# Patient Record
Sex: FEMALE | Race: WHITE | Employment: OTHER | URBAN - METROPOLITAN AREA
[De-identification: names, ages, dates, MRNs, and addresses within clinical notes are randomized per-mention and may not be internally consistent; named-entity substitution may affect disease eponyms.]

---

## 2019-02-25 RX ORDER — LISINOPRIL 20 MG/1
20 TABLET ORAL DAILY
Status: ON HOLD | COMMUNITY
End: 2020-03-19 | Stop reason: HOSPADM

## 2019-02-25 RX ORDER — ATENOLOL 50 MG/1
50 TABLET ORAL DAILY
Status: ON HOLD | COMMUNITY
End: 2020-03-19 | Stop reason: HOSPADM

## 2019-02-25 RX ORDER — QUETIAPINE FUMARATE 50 MG
50 TABLET ORAL DAILY
Status: ON HOLD | COMMUNITY
End: 2019-12-03

## 2019-02-25 RX ORDER — FENOFIBRATE 160 MG/1
160 TABLET ORAL DAILY
Status: ON HOLD | COMMUNITY
End: 2020-03-18

## 2019-03-06 ENCOUNTER — ANESTHESIA EVENT (OUTPATIENT)
Dept: SURGERY | Age: 74
End: 2019-03-06
Payer: MEDICARE

## 2019-03-11 ENCOUNTER — ANESTHESIA (OUTPATIENT)
Dept: SURGERY | Age: 74
End: 2019-03-11
Payer: MEDICARE

## 2019-03-11 ENCOUNTER — HOSPITAL ENCOUNTER (OUTPATIENT)
Age: 74
Setting detail: OUTPATIENT SURGERY
Discharge: HOME | End: 2019-03-11
Attending: OPHTHALMOLOGY | Admitting: OPHTHALMOLOGY
Payer: MEDICARE

## 2019-03-11 VITALS
BODY MASS INDEX: 28.61 KG/M2 | WEIGHT: 178 LBS | OXYGEN SATURATION: 96 % | HEART RATE: 54 BPM | DIASTOLIC BLOOD PRESSURE: 66 MMHG | SYSTOLIC BLOOD PRESSURE: 125 MMHG | HEIGHT: 66 IN | RESPIRATION RATE: 14 BRPM | TEMPERATURE: 98.9 F

## 2019-03-11 VITALS — DIASTOLIC BLOOD PRESSURE: 70 MMHG | OXYGEN SATURATION: 100 % | SYSTOLIC BLOOD PRESSURE: 135 MMHG

## 2019-03-11 LAB
GLUCOSE BLD-MCNC: 158 MG/DL (ref 70–99)
GLUCOSE BLD-MCNC: 167 MG/DL (ref 70–99)
PERFORMED ON: ABNORMAL
PERFORMED ON: ABNORMAL

## 2019-03-11 PROCEDURE — 3700000001 HC ADD 15 MINUTES (ANESTHESIA): Performed by: OPHTHALMOLOGY

## 2019-03-11 PROCEDURE — 2580000003 HC RX 258: Performed by: NURSE ANESTHETIST, CERTIFIED REGISTERED

## 2019-03-11 PROCEDURE — 3600000014 HC SURGERY LEVEL 4 ADDTL 15MIN: Performed by: OPHTHALMOLOGY

## 2019-03-11 PROCEDURE — 3700000000 HC ANESTHESIA ATTENDED CARE: Performed by: OPHTHALMOLOGY

## 2019-03-11 PROCEDURE — 6370000000 HC RX 637 (ALT 250 FOR IP): Performed by: OPHTHALMOLOGY

## 2019-03-11 PROCEDURE — 3600000004 HC SURGERY LEVEL 4 BASE: Performed by: OPHTHALMOLOGY

## 2019-03-11 PROCEDURE — 2580000003 HC RX 258: Performed by: ANESTHESIOLOGY

## 2019-03-11 PROCEDURE — 2500000003 HC RX 250 WO HCPCS: Performed by: OPHTHALMOLOGY

## 2019-03-11 PROCEDURE — C1780 LENS, INTRAOCULAR (NEW TECH): HCPCS | Performed by: OPHTHALMOLOGY

## 2019-03-11 PROCEDURE — 2709999900 HC NON-CHARGEABLE SUPPLY: Performed by: OPHTHALMOLOGY

## 2019-03-11 PROCEDURE — 6360000002 HC RX W HCPCS: Performed by: ANESTHESIOLOGY

## 2019-03-11 PROCEDURE — 7100000010 HC PHASE II RECOVERY - FIRST 15 MIN: Performed by: OPHTHALMOLOGY

## 2019-03-11 DEVICE — LENS INTOCU 21.0 DIOPT 118.7 A CONSTANT L13MM DIA6MM 0DEG: Type: IMPLANTABLE DEVICE | Status: FUNCTIONAL

## 2019-03-11 RX ORDER — SODIUM CHLORIDE 9 MG/ML
INJECTION, SOLUTION INTRAVENOUS CONTINUOUS
Status: DISCONTINUED | OUTPATIENT
Start: 2019-03-11 | End: 2019-03-11 | Stop reason: HOSPADM

## 2019-03-11 RX ORDER — TETRACAINE HYDROCHLORIDE 5 MG/ML
SOLUTION OPHTHALMIC
Status: COMPLETED | OUTPATIENT
Start: 2019-03-11 | End: 2019-03-11

## 2019-03-11 RX ORDER — SODIUM CHLORIDE 0.9 % (FLUSH) 0.9 %
10 SYRINGE (ML) INJECTION EVERY 12 HOURS SCHEDULED
Status: DISCONTINUED | OUTPATIENT
Start: 2019-03-11 | End: 2019-03-11 | Stop reason: HOSPADM

## 2019-03-11 RX ORDER — MORPHINE SULFATE 4 MG/ML
1 INJECTION, SOLUTION INTRAMUSCULAR; INTRAVENOUS EVERY 5 MIN PRN
Status: DISCONTINUED | OUTPATIENT
Start: 2019-03-11 | End: 2019-03-11 | Stop reason: HOSPADM

## 2019-03-11 RX ORDER — FENTANYL CITRATE 50 UG/ML
50 INJECTION, SOLUTION INTRAMUSCULAR; INTRAVENOUS EVERY 5 MIN PRN
Status: DISCONTINUED | OUTPATIENT
Start: 2019-03-11 | End: 2019-03-11 | Stop reason: HOSPADM

## 2019-03-11 RX ORDER — SODIUM CHLORIDE 0.9 % (FLUSH) 0.9 %
10 SYRINGE (ML) INJECTION EVERY 12 HOURS SCHEDULED
Status: DISCONTINUED | OUTPATIENT
Start: 2019-03-11 | End: 2019-03-11 | Stop reason: SDUPTHER

## 2019-03-11 RX ORDER — OXYCODONE HYDROCHLORIDE AND ACETAMINOPHEN 5; 325 MG/1; MG/1
1 TABLET ORAL PRN
Status: DISCONTINUED | OUTPATIENT
Start: 2019-03-11 | End: 2019-03-11 | Stop reason: HOSPADM

## 2019-03-11 RX ORDER — FENTANYL CITRATE 50 UG/ML
25 INJECTION, SOLUTION INTRAMUSCULAR; INTRAVENOUS EVERY 5 MIN PRN
Status: COMPLETED | OUTPATIENT
Start: 2019-03-11 | End: 2019-03-11

## 2019-03-11 RX ORDER — ONDANSETRON 2 MG/ML
4 INJECTION INTRAMUSCULAR; INTRAVENOUS
Status: DISCONTINUED | OUTPATIENT
Start: 2019-03-11 | End: 2019-03-11 | Stop reason: HOSPADM

## 2019-03-11 RX ORDER — MEPERIDINE HYDROCHLORIDE 25 MG/ML
12.5 INJECTION INTRAMUSCULAR; INTRAVENOUS; SUBCUTANEOUS EVERY 5 MIN PRN
Status: DISCONTINUED | OUTPATIENT
Start: 2019-03-11 | End: 2019-03-11 | Stop reason: HOSPADM

## 2019-03-11 RX ORDER — SODIUM CHLORIDE 9 MG/ML
INJECTION, SOLUTION INTRAVENOUS CONTINUOUS PRN
Status: DISCONTINUED | OUTPATIENT
Start: 2019-03-11 | End: 2019-03-11 | Stop reason: SDUPTHER

## 2019-03-11 RX ORDER — CIPROFLOXACIN HYDROCHLORIDE 3.5 MG/ML
1 SOLUTION/ DROPS TOPICAL SEE ADMIN INSTRUCTIONS
Status: DISCONTINUED | OUTPATIENT
Start: 2019-03-11 | End: 2019-03-11 | Stop reason: HOSPADM

## 2019-03-11 RX ORDER — CIPROFLOXACIN HYDROCHLORIDE 3.5 MG/ML
1 SOLUTION/ DROPS TOPICAL SEE ADMIN INSTRUCTIONS
OUTPATIENT
Start: 2019-03-11 | End: 2019-03-21

## 2019-03-11 RX ORDER — SODIUM CHLORIDE 0.9 % (FLUSH) 0.9 %
10 SYRINGE (ML) INJECTION PRN
Status: DISCONTINUED | OUTPATIENT
Start: 2019-03-11 | End: 2019-03-11 | Stop reason: HOSPADM

## 2019-03-11 RX ORDER — LIDOCAINE HYDROCHLORIDE 10 MG/ML
INJECTION, SOLUTION EPIDURAL; INFILTRATION; INTRACAUDAL; PERINEURAL
Status: COMPLETED | OUTPATIENT
Start: 2019-03-11 | End: 2019-03-11

## 2019-03-11 RX ORDER — OXYCODONE HYDROCHLORIDE AND ACETAMINOPHEN 5; 325 MG/1; MG/1
2 TABLET ORAL PRN
Status: DISCONTINUED | OUTPATIENT
Start: 2019-03-11 | End: 2019-03-11 | Stop reason: HOSPADM

## 2019-03-11 RX ORDER — TETRACAINE HYDROCHLORIDE 5 MG/ML
1 SOLUTION OPHTHALMIC ONCE
Status: COMPLETED | OUTPATIENT
Start: 2019-03-11 | End: 2019-03-11

## 2019-03-11 RX ORDER — MORPHINE SULFATE 4 MG/ML
2 INJECTION, SOLUTION INTRAMUSCULAR; INTRAVENOUS EVERY 5 MIN PRN
Status: DISCONTINUED | OUTPATIENT
Start: 2019-03-11 | End: 2019-03-11 | Stop reason: HOSPADM

## 2019-03-11 RX ORDER — SODIUM CHLORIDE 0.9 % (FLUSH) 0.9 %
10 SYRINGE (ML) INJECTION PRN
Status: DISCONTINUED | OUTPATIENT
Start: 2019-03-11 | End: 2019-03-11 | Stop reason: SDUPTHER

## 2019-03-11 RX ORDER — BRIMONIDINE TARTRATE 0.15 %
DROPS OPHTHALMIC (EYE)
Status: COMPLETED | OUTPATIENT
Start: 2019-03-11 | End: 2019-03-11

## 2019-03-11 RX ORDER — BALANCED SALT SOLUTION 6.4; .75; .48; .3; 3.9; 1.7 MG/ML; MG/ML; MG/ML; MG/ML; MG/ML; MG/ML
SOLUTION OPHTHALMIC
Status: COMPLETED | OUTPATIENT
Start: 2019-03-11 | End: 2019-03-11

## 2019-03-11 RX ADMIN — FENTANYL CITRATE 25 MCG: 50 INJECTION INTRAMUSCULAR; INTRAVENOUS at 07:51

## 2019-03-11 RX ADMIN — FENTANYL CITRATE 25 MCG: 50 INJECTION INTRAMUSCULAR; INTRAVENOUS at 07:49

## 2019-03-11 RX ADMIN — SODIUM CHLORIDE: 9 INJECTION, SOLUTION INTRAVENOUS at 07:09

## 2019-03-11 RX ADMIN — Medication 0.5 ML: at 07:09

## 2019-03-11 RX ADMIN — FENTANYL CITRATE 25 MCG: 50 INJECTION INTRAMUSCULAR; INTRAVENOUS at 07:36

## 2019-03-11 RX ADMIN — Medication 0.5 ML: at 07:03

## 2019-03-11 RX ADMIN — FENTANYL CITRATE 25 MCG: 50 INJECTION INTRAMUSCULAR; INTRAVENOUS at 07:34

## 2019-03-11 RX ADMIN — POVIDONE-IODINE: 5 SOLUTION OPHTHALMIC at 07:09

## 2019-03-11 RX ADMIN — LIDOCAINE HYDROCHLORIDE: 35 GEL OPHTHALMIC at 07:09

## 2019-03-11 RX ADMIN — TETRACAINE HYDROCHLORIDE 1 DROP: 5 SOLUTION OPHTHALMIC at 07:01

## 2019-03-11 RX ADMIN — SODIUM CHLORIDE: 9 INJECTION, SOLUTION INTRAVENOUS at 07:15

## 2019-03-11 RX ADMIN — CIPROFLOXACIN HYDROCHLORIDE 1 DROP: 3 SOLUTION/ DROPS OPHTHALMIC at 07:02

## 2019-03-11 ASSESSMENT — PULMONARY FUNCTION TESTS
PIF_VALUE: 0

## 2019-03-11 ASSESSMENT — PAIN - FUNCTIONAL ASSESSMENT: PAIN_FUNCTIONAL_ASSESSMENT: 0-10

## 2019-03-11 ASSESSMENT — PAIN SCALES - GENERAL
PAINLEVEL_OUTOF10: 0

## 2019-03-11 ASSESSMENT — LIFESTYLE VARIABLES: SMOKING_STATUS: 0

## 2019-03-22 ENCOUNTER — ANESTHESIA EVENT (OUTPATIENT)
Dept: SURGERY | Age: 74
End: 2019-03-22
Payer: MEDICARE

## 2019-03-25 ENCOUNTER — ANESTHESIA (OUTPATIENT)
Dept: SURGERY | Age: 74
End: 2019-03-25
Payer: MEDICARE

## 2019-03-25 ENCOUNTER — HOSPITAL ENCOUNTER (OUTPATIENT)
Age: 74
Setting detail: OUTPATIENT SURGERY
Discharge: HOME | End: 2019-03-25
Attending: OPHTHALMOLOGY | Admitting: OPHTHALMOLOGY
Payer: MEDICARE

## 2019-03-25 VITALS
OXYGEN SATURATION: 99 % | SYSTOLIC BLOOD PRESSURE: 141 MMHG | DIASTOLIC BLOOD PRESSURE: 85 MMHG | RESPIRATION RATE: 16 BRPM | BODY MASS INDEX: 28.61 KG/M2 | HEART RATE: 51 BPM | TEMPERATURE: 97.1 F | WEIGHT: 178 LBS | HEIGHT: 66 IN

## 2019-03-25 VITALS — SYSTOLIC BLOOD PRESSURE: 150 MMHG | OXYGEN SATURATION: 100 % | DIASTOLIC BLOOD PRESSURE: 69 MMHG

## 2019-03-25 LAB
GLUCOSE BLD-MCNC: 170 MG/DL (ref 70–99)
GLUCOSE BLD-MCNC: 175 MG/DL (ref 70–99)
PERFORMED ON: ABNORMAL
PERFORMED ON: ABNORMAL

## 2019-03-25 PROCEDURE — 6370000000 HC RX 637 (ALT 250 FOR IP): Performed by: OPHTHALMOLOGY

## 2019-03-25 PROCEDURE — 2709999900 HC NON-CHARGEABLE SUPPLY: Performed by: OPHTHALMOLOGY

## 2019-03-25 PROCEDURE — C1780 LENS, INTRAOCULAR (NEW TECH): HCPCS | Performed by: OPHTHALMOLOGY

## 2019-03-25 PROCEDURE — 2500000003 HC RX 250 WO HCPCS: Performed by: OPHTHALMOLOGY

## 2019-03-25 PROCEDURE — 7100000010 HC PHASE II RECOVERY - FIRST 15 MIN: Performed by: OPHTHALMOLOGY

## 2019-03-25 PROCEDURE — 3700000001 HC ADD 15 MINUTES (ANESTHESIA): Performed by: OPHTHALMOLOGY

## 2019-03-25 PROCEDURE — 6360000002 HC RX W HCPCS: Performed by: NURSE ANESTHETIST, CERTIFIED REGISTERED

## 2019-03-25 PROCEDURE — 3600000014 HC SURGERY LEVEL 4 ADDTL 15MIN: Performed by: OPHTHALMOLOGY

## 2019-03-25 PROCEDURE — 3700000000 HC ANESTHESIA ATTENDED CARE: Performed by: OPHTHALMOLOGY

## 2019-03-25 PROCEDURE — 3600000004 HC SURGERY LEVEL 4 BASE: Performed by: OPHTHALMOLOGY

## 2019-03-25 PROCEDURE — 2580000003 HC RX 258: Performed by: ANESTHESIOLOGY

## 2019-03-25 DEVICE — LENS INTOCU 21.5 DIOPT 118.7 A CONSTANT L13MM DIA6MM 0DEG: Type: IMPLANTABLE DEVICE | Site: EYE | Status: FUNCTIONAL

## 2019-03-25 RX ORDER — BALANCED SALT SOLUTION 6.4; .75; .48; .3; 3.9; 1.7 MG/ML; MG/ML; MG/ML; MG/ML; MG/ML; MG/ML
SOLUTION OPHTHALMIC
Status: COMPLETED | OUTPATIENT
Start: 2019-03-25 | End: 2019-03-25

## 2019-03-25 RX ORDER — SODIUM CHLORIDE 0.9 % (FLUSH) 0.9 %
10 SYRINGE (ML) INJECTION PRN
Status: DISCONTINUED | OUTPATIENT
Start: 2019-03-25 | End: 2019-03-25 | Stop reason: SDUPTHER

## 2019-03-25 RX ORDER — FENTANYL CITRATE 50 UG/ML
25 INJECTION, SOLUTION INTRAMUSCULAR; INTRAVENOUS EVERY 5 MIN PRN
Status: DISCONTINUED | OUTPATIENT
Start: 2019-03-25 | End: 2019-03-25 | Stop reason: HOSPADM

## 2019-03-25 RX ORDER — SODIUM CHLORIDE 0.9 % (FLUSH) 0.9 %
10 SYRINGE (ML) INJECTION EVERY 12 HOURS SCHEDULED
Status: DISCONTINUED | OUTPATIENT
Start: 2019-03-25 | End: 2019-03-25 | Stop reason: SDUPTHER

## 2019-03-25 RX ORDER — OXYCODONE HYDROCHLORIDE AND ACETAMINOPHEN 5; 325 MG/1; MG/1
2 TABLET ORAL PRN
Status: DISCONTINUED | OUTPATIENT
Start: 2019-03-25 | End: 2019-03-25 | Stop reason: HOSPADM

## 2019-03-25 RX ORDER — MORPHINE SULFATE 4 MG/ML
2 INJECTION, SOLUTION INTRAMUSCULAR; INTRAVENOUS EVERY 5 MIN PRN
Status: DISCONTINUED | OUTPATIENT
Start: 2019-03-25 | End: 2019-03-25 | Stop reason: HOSPADM

## 2019-03-25 RX ORDER — BRIMONIDINE TARTRATE 0.15 %
DROPS OPHTHALMIC (EYE)
Status: COMPLETED | OUTPATIENT
Start: 2019-03-25 | End: 2019-03-25

## 2019-03-25 RX ORDER — TETRACAINE HYDROCHLORIDE 5 MG/ML
SOLUTION OPHTHALMIC
Status: COMPLETED | OUTPATIENT
Start: 2019-03-25 | End: 2019-03-25

## 2019-03-25 RX ORDER — CIPROFLOXACIN HYDROCHLORIDE 3.5 MG/ML
1 SOLUTION/ DROPS TOPICAL SEE ADMIN INSTRUCTIONS
Status: DISCONTINUED | OUTPATIENT
Start: 2019-03-25 | End: 2019-03-25 | Stop reason: HOSPADM

## 2019-03-25 RX ORDER — SODIUM CHLORIDE 0.9 % (FLUSH) 0.9 %
10 SYRINGE (ML) INJECTION EVERY 12 HOURS SCHEDULED
Status: DISCONTINUED | OUTPATIENT
Start: 2019-03-25 | End: 2019-03-25 | Stop reason: HOSPADM

## 2019-03-25 RX ORDER — MORPHINE SULFATE 4 MG/ML
1 INJECTION, SOLUTION INTRAMUSCULAR; INTRAVENOUS EVERY 5 MIN PRN
Status: DISCONTINUED | OUTPATIENT
Start: 2019-03-25 | End: 2019-03-25 | Stop reason: HOSPADM

## 2019-03-25 RX ORDER — OXYCODONE HYDROCHLORIDE AND ACETAMINOPHEN 5; 325 MG/1; MG/1
1 TABLET ORAL PRN
Status: DISCONTINUED | OUTPATIENT
Start: 2019-03-25 | End: 2019-03-25 | Stop reason: HOSPADM

## 2019-03-25 RX ORDER — MEPERIDINE HYDROCHLORIDE 25 MG/ML
12.5 INJECTION INTRAMUSCULAR; INTRAVENOUS; SUBCUTANEOUS EVERY 5 MIN PRN
Status: DISCONTINUED | OUTPATIENT
Start: 2019-03-25 | End: 2019-03-25 | Stop reason: HOSPADM

## 2019-03-25 RX ORDER — SODIUM CHLORIDE 9 MG/ML
INJECTION, SOLUTION INTRAVENOUS CONTINUOUS
Status: DISCONTINUED | OUTPATIENT
Start: 2019-03-25 | End: 2019-03-25 | Stop reason: HOSPADM

## 2019-03-25 RX ORDER — TETRACAINE HYDROCHLORIDE 5 MG/ML
1 SOLUTION OPHTHALMIC ONCE
Status: COMPLETED | OUTPATIENT
Start: 2019-03-25 | End: 2019-03-25

## 2019-03-25 RX ORDER — ONDANSETRON 2 MG/ML
4 INJECTION INTRAMUSCULAR; INTRAVENOUS
Status: DISCONTINUED | OUTPATIENT
Start: 2019-03-25 | End: 2019-03-25 | Stop reason: HOSPADM

## 2019-03-25 RX ORDER — LIDOCAINE HYDROCHLORIDE 10 MG/ML
INJECTION, SOLUTION EPIDURAL; INFILTRATION; INTRACAUDAL; PERINEURAL
Status: COMPLETED | OUTPATIENT
Start: 2019-03-25 | End: 2019-03-25

## 2019-03-25 RX ORDER — MIDAZOLAM HYDROCHLORIDE 1 MG/ML
INJECTION INTRAMUSCULAR; INTRAVENOUS PRN
Status: DISCONTINUED | OUTPATIENT
Start: 2019-03-25 | End: 2019-03-25 | Stop reason: SDUPTHER

## 2019-03-25 RX ORDER — FENTANYL CITRATE 50 UG/ML
50 INJECTION, SOLUTION INTRAMUSCULAR; INTRAVENOUS EVERY 5 MIN PRN
Status: DISCONTINUED | OUTPATIENT
Start: 2019-03-25 | End: 2019-03-25 | Stop reason: HOSPADM

## 2019-03-25 RX ORDER — SODIUM CHLORIDE 0.9 % (FLUSH) 0.9 %
10 SYRINGE (ML) INJECTION PRN
Status: DISCONTINUED | OUTPATIENT
Start: 2019-03-25 | End: 2019-03-25 | Stop reason: HOSPADM

## 2019-03-25 RX ADMIN — SODIUM CHLORIDE: 0.9 INJECTION, SOLUTION INTRAVENOUS at 06:44

## 2019-03-25 RX ADMIN — LIDOCAINE HYDROCHLORIDE: 35 GEL OPHTHALMIC at 07:31

## 2019-03-25 RX ADMIN — CIPROFLOXACIN HYDROCHLORIDE 1 DROP: 3 SOLUTION/ DROPS OPHTHALMIC at 06:43

## 2019-03-25 RX ADMIN — TETRACAINE HYDROCHLORIDE 1 DROP: 5 SOLUTION OPHTHALMIC at 06:43

## 2019-03-25 RX ADMIN — POVIDONE-IODINE 0.1 ML: 5 SOLUTION OPHTHALMIC at 06:43

## 2019-03-25 RX ADMIN — MIDAZOLAM 2 MG: 1 INJECTION INTRAMUSCULAR; INTRAVENOUS at 06:50

## 2019-03-25 RX ADMIN — Medication 0.5 ML: at 06:43

## 2019-03-25 ASSESSMENT — PAIN SCALES - GENERAL
PAINLEVEL_OUTOF10: 0
PAINLEVEL_OUTOF10: 0

## 2019-03-25 ASSESSMENT — PAIN - FUNCTIONAL ASSESSMENT: PAIN_FUNCTIONAL_ASSESSMENT: 0-10

## 2019-03-25 ASSESSMENT — LIFESTYLE VARIABLES: SMOKING_STATUS: 0

## 2019-12-02 ENCOUNTER — APPOINTMENT (OUTPATIENT)
Dept: CT IMAGING | Age: 74
DRG: 062 | End: 2019-12-02
Payer: MEDICARE

## 2019-12-02 ENCOUNTER — HOSPITAL ENCOUNTER (INPATIENT)
Age: 74
LOS: 2 days | Discharge: NURSING, REHAB OR LONG-TERM CARE | DRG: 062 | End: 2019-12-05
Attending: EMERGENCY MEDICINE | Admitting: PEDIATRICS
Payer: MEDICARE

## 2019-12-02 ENCOUNTER — APPOINTMENT (OUTPATIENT)
Dept: GENERAL RADIOLOGY | Age: 74
DRG: 062 | End: 2019-12-02
Payer: MEDICARE

## 2019-12-02 DIAGNOSIS — R53.1 RIGHT SIDED WEAKNESS: ICD-10-CM

## 2019-12-02 DIAGNOSIS — R47.1 DYSARTHRIA: ICD-10-CM

## 2019-12-02 DIAGNOSIS — I63.9 CEREBROVASCULAR ACCIDENT (CVA), UNSPECIFIED MECHANISM (HCC): Primary | ICD-10-CM

## 2019-12-02 LAB
A/G RATIO: 2.3 (ref 1.1–2.2)
ALBUMIN SERPL-MCNC: 5 G/DL (ref 3.4–5)
ALP BLD-CCNC: 64 U/L (ref 40–129)
ALT SERPL-CCNC: 32 U/L (ref 10–40)
ANION GAP SERPL CALCULATED.3IONS-SCNC: 15 MMOL/L (ref 3–16)
AST SERPL-CCNC: 30 U/L (ref 15–37)
BASOPHILS ABSOLUTE: 0.1 K/UL (ref 0–0.2)
BASOPHILS RELATIVE PERCENT: 1.1 %
BILIRUB SERPL-MCNC: <0.2 MG/DL (ref 0–1)
BUN BLDV-MCNC: 13 MG/DL (ref 7–20)
CALCIUM SERPL-MCNC: 10 MG/DL (ref 8.3–10.6)
CHLORIDE BLD-SCNC: 101 MMOL/L (ref 99–110)
CO2: 24 MMOL/L (ref 21–32)
CREAT SERPL-MCNC: 1 MG/DL (ref 0.6–1.2)
EOSINOPHILS ABSOLUTE: 0.4 K/UL (ref 0–0.6)
EOSINOPHILS RELATIVE PERCENT: 5.1 %
GFR AFRICAN AMERICAN: >60
GFR NON-AFRICAN AMERICAN: 54
GLOBULIN: 2.2 G/DL
GLUCOSE BLD-MCNC: 137 MG/DL (ref 70–99)
HCT VFR BLD CALC: 43 % (ref 36–48)
HEMOGLOBIN: 14.5 G/DL (ref 12–16)
INR BLD: 0.91 (ref 0.86–1.14)
LYMPHOCYTES ABSOLUTE: 2.2 K/UL (ref 1–5.1)
LYMPHOCYTES RELATIVE PERCENT: 31.5 %
MCH RBC QN AUTO: 31.9 PG (ref 26–34)
MCHC RBC AUTO-ENTMCNC: 33.8 G/DL (ref 31–36)
MCV RBC AUTO: 94.4 FL (ref 80–100)
MONOCYTES ABSOLUTE: 0.7 K/UL (ref 0–1.3)
MONOCYTES RELATIVE PERCENT: 10.4 %
NEUTROPHILS ABSOLUTE: 3.5 K/UL (ref 1.7–7.7)
NEUTROPHILS RELATIVE PERCENT: 51.9 %
PDW BLD-RTO: 13.2 % (ref 12.4–15.4)
PLATELET # BLD: 297 K/UL (ref 135–450)
PMV BLD AUTO: 8.2 FL (ref 5–10.5)
POTASSIUM REFLEX MAGNESIUM: 4 MMOL/L (ref 3.5–5.1)
PROTHROMBIN TIME: 10.5 SEC (ref 10–13.2)
RBC # BLD: 4.55 M/UL (ref 4–5.2)
SODIUM BLD-SCNC: 140 MMOL/L (ref 136–145)
TOTAL PROTEIN: 7.2 G/DL (ref 6.4–8.2)
TROPONIN: <0.01 NG/ML
WBC # BLD: 6.8 K/UL (ref 4–11)

## 2019-12-02 PROCEDURE — 70496 CT ANGIOGRAPHY HEAD: CPT

## 2019-12-02 PROCEDURE — 85610 PROTHROMBIN TIME: CPT

## 2019-12-02 PROCEDURE — 3E03317 INTRODUCTION OF OTHER THROMBOLYTIC INTO PERIPHERAL VEIN, PERCUTANEOUS APPROACH: ICD-10-PCS | Performed by: EMERGENCY MEDICINE

## 2019-12-02 PROCEDURE — 6360000004 HC RX CONTRAST MEDICATION: Performed by: EMERGENCY MEDICINE

## 2019-12-02 PROCEDURE — 96376 TX/PRO/DX INJ SAME DRUG ADON: CPT

## 2019-12-02 PROCEDURE — 80053 COMPREHEN METABOLIC PANEL: CPT

## 2019-12-02 PROCEDURE — 96361 HYDRATE IV INFUSION ADD-ON: CPT

## 2019-12-02 PROCEDURE — 96365 THER/PROPH/DIAG IV INF INIT: CPT

## 2019-12-02 PROCEDURE — 71045 X-RAY EXAM CHEST 1 VIEW: CPT

## 2019-12-02 PROCEDURE — 93005 ELECTROCARDIOGRAM TRACING: CPT | Performed by: EMERGENCY MEDICINE

## 2019-12-02 PROCEDURE — 83036 HEMOGLOBIN GLYCOSYLATED A1C: CPT

## 2019-12-02 PROCEDURE — 70450 CT HEAD/BRAIN W/O DYE: CPT

## 2019-12-02 PROCEDURE — 2580000003 HC RX 258: Performed by: EMERGENCY MEDICINE

## 2019-12-02 PROCEDURE — 6360000002 HC RX W HCPCS

## 2019-12-02 PROCEDURE — 96375 TX/PRO/DX INJ NEW DRUG ADDON: CPT

## 2019-12-02 PROCEDURE — 6360000002 HC RX W HCPCS: Performed by: EMERGENCY MEDICINE

## 2019-12-02 PROCEDURE — 99291 CRITICAL CARE FIRST HOUR: CPT

## 2019-12-02 PROCEDURE — 84484 ASSAY OF TROPONIN QUANT: CPT

## 2019-12-02 PROCEDURE — 85025 COMPLETE CBC W/AUTO DIFF WBC: CPT

## 2019-12-02 PROCEDURE — 36415 COLL VENOUS BLD VENIPUNCTURE: CPT

## 2019-12-02 RX ORDER — SODIUM CHLORIDE 0.9 % (FLUSH) 0.9 %
10 SYRINGE (ML) INJECTION EVERY 12 HOURS SCHEDULED
Status: DISCONTINUED | OUTPATIENT
Start: 2019-12-02 | End: 2019-12-03 | Stop reason: SDUPTHER

## 2019-12-02 RX ORDER — DIPHENHYDRAMINE HYDROCHLORIDE 50 MG/ML
25 INJECTION INTRAMUSCULAR; INTRAVENOUS ONCE
Status: COMPLETED | OUTPATIENT
Start: 2019-12-02 | End: 2019-12-02

## 2019-12-02 RX ORDER — 0.9 % SODIUM CHLORIDE 0.9 %
50 INTRAVENOUS SOLUTION INTRAVENOUS ONCE
Status: COMPLETED | OUTPATIENT
Start: 2019-12-02 | End: 2019-12-03

## 2019-12-02 RX ORDER — METOCLOPRAMIDE HYDROCHLORIDE 5 MG/ML
10 INJECTION INTRAMUSCULAR; INTRAVENOUS ONCE
Status: COMPLETED | OUTPATIENT
Start: 2019-12-02 | End: 2019-12-02

## 2019-12-02 RX ORDER — DEXTROSE MONOHYDRATE 25 G/50ML
12.5 INJECTION, SOLUTION INTRAVENOUS
Status: ACTIVE | OUTPATIENT
Start: 2019-12-02 | End: 2019-12-02

## 2019-12-02 RX ORDER — 0.9 % SODIUM CHLORIDE 0.9 %
1000 INTRAVENOUS SOLUTION INTRAVENOUS ONCE
Status: COMPLETED | OUTPATIENT
Start: 2019-12-02 | End: 2019-12-03

## 2019-12-02 RX ORDER — SODIUM CHLORIDE 0.9 % (FLUSH) 0.9 %
10 SYRINGE (ML) INJECTION PRN
Status: DISCONTINUED | OUTPATIENT
Start: 2019-12-02 | End: 2019-12-03 | Stop reason: SDUPTHER

## 2019-12-02 RX ADMIN — DIPHENHYDRAMINE HYDROCHLORIDE 25 MG: 50 INJECTION, SOLUTION INTRAMUSCULAR; INTRAVENOUS at 21:29

## 2019-12-02 RX ADMIN — IOPAMIDOL 75 ML: 755 INJECTION, SOLUTION INTRAVENOUS at 21:00

## 2019-12-02 RX ADMIN — METOCLOPRAMIDE 10 MG: 5 INJECTION, SOLUTION INTRAMUSCULAR; INTRAVENOUS at 21:28

## 2019-12-02 RX ADMIN — SODIUM CHLORIDE 50 ML: 9 INJECTION, SOLUTION INTRAVENOUS at 22:53

## 2019-12-02 RX ADMIN — SODIUM CHLORIDE 1000 ML: 9 INJECTION, SOLUTION INTRAVENOUS at 22:53

## 2019-12-02 RX ADMIN — ALTEPLASE 67.9 MG: KIT at 21:44

## 2019-12-02 RX ADMIN — ALTEPLASE 7.5 MG: KIT at 21:41

## 2019-12-02 ASSESSMENT — ENCOUNTER SYMPTOMS
GASTROINTESTINAL NEGATIVE: 1
COUGH: 0
SHORTNESS OF BREATH: 0
BACK PAIN: 0
EYES NEGATIVE: 1
VOMITING: 0
RESPIRATORY NEGATIVE: 1
ABDOMINAL PAIN: 0
NAUSEA: 0

## 2019-12-03 ENCOUNTER — APPOINTMENT (OUTPATIENT)
Dept: MRI IMAGING | Age: 74
DRG: 062 | End: 2019-12-03
Payer: MEDICARE

## 2019-12-03 ENCOUNTER — APPOINTMENT (OUTPATIENT)
Dept: CT IMAGING | Age: 74
DRG: 062 | End: 2019-12-03
Payer: MEDICARE

## 2019-12-03 PROBLEM — Z92.82 RECEIVED INTRAVENOUS TISSUE PLASMINOGEN ACTIVATOR (TPA) IN EMERGENCY DEPARTMENT: Status: ACTIVE | Noted: 2019-12-03

## 2019-12-03 LAB
ANION GAP SERPL CALCULATED.3IONS-SCNC: 16 MMOL/L (ref 3–16)
BUN BLDV-MCNC: 14 MG/DL (ref 7–20)
CALCIUM SERPL-MCNC: 9.3 MG/DL (ref 8.3–10.6)
CHLORIDE BLD-SCNC: 104 MMOL/L (ref 99–110)
CO2: 20 MMOL/L (ref 21–32)
CREAT SERPL-MCNC: 0.9 MG/DL (ref 0.6–1.2)
EKG ATRIAL RATE: 73 BPM
EKG ATRIAL RATE: 75 BPM
EKG DIAGNOSIS: NORMAL
EKG DIAGNOSIS: NORMAL
EKG P AXIS: 21 DEGREES
EKG P AXIS: 23 DEGREES
EKG P-R INTERVAL: 148 MS
EKG P-R INTERVAL: 148 MS
EKG Q-T INTERVAL: 394 MS
EKG Q-T INTERVAL: 410 MS
EKG QRS DURATION: 80 MS
EKG QRS DURATION: 82 MS
EKG QTC CALCULATION (BAZETT): 439 MS
EKG QTC CALCULATION (BAZETT): 457 MS
EKG R AXIS: -12 DEGREES
EKG R AXIS: 2 DEGREES
EKG T AXIS: -14 DEGREES
EKG T AXIS: -22 DEGREES
EKG VENTRICULAR RATE: 75 BPM
EKG VENTRICULAR RATE: 75 BPM
ESTIMATED AVERAGE GLUCOSE: 137 MG/DL
GFR AFRICAN AMERICAN: >60
GFR NON-AFRICAN AMERICAN: >60
GLUCOSE BLD-MCNC: 124 MG/DL (ref 70–99)
GLUCOSE BLD-MCNC: 129 MG/DL (ref 70–99)
GLUCOSE BLD-MCNC: 129 MG/DL (ref 70–99)
GLUCOSE BLD-MCNC: 155 MG/DL (ref 70–99)
GLUCOSE BLD-MCNC: 158 MG/DL (ref 70–99)
HBA1C MFR BLD: 6.4 %
PERFORMED ON: ABNORMAL
POTASSIUM SERPL-SCNC: 4.1 MMOL/L (ref 3.5–5.1)
SODIUM BLD-SCNC: 140 MMOL/L (ref 136–145)

## 2019-12-03 PROCEDURE — 97116 GAIT TRAINING THERAPY: CPT

## 2019-12-03 PROCEDURE — 6370000000 HC RX 637 (ALT 250 FOR IP): Performed by: NURSE PRACTITIONER

## 2019-12-03 PROCEDURE — 93010 ELECTROCARDIOGRAM REPORT: CPT | Performed by: INTERNAL MEDICINE

## 2019-12-03 PROCEDURE — 93005 ELECTROCARDIOGRAM TRACING: CPT | Performed by: INTERNAL MEDICINE

## 2019-12-03 PROCEDURE — 80048 BASIC METABOLIC PNL TOTAL CA: CPT

## 2019-12-03 PROCEDURE — 36415 COLL VENOUS BLD VENIPUNCTURE: CPT

## 2019-12-03 PROCEDURE — 6370000000 HC RX 637 (ALT 250 FOR IP): Performed by: PEDIATRICS

## 2019-12-03 PROCEDURE — 97162 PT EVAL MOD COMPLEX 30 MIN: CPT

## 2019-12-03 PROCEDURE — 70551 MRI BRAIN STEM W/O DYE: CPT

## 2019-12-03 PROCEDURE — 2500000003 HC RX 250 WO HCPCS: Performed by: INTERNAL MEDICINE

## 2019-12-03 PROCEDURE — 97530 THERAPEUTIC ACTIVITIES: CPT

## 2019-12-03 PROCEDURE — 92523 SPEECH SOUND LANG COMPREHEN: CPT

## 2019-12-03 PROCEDURE — 2000000000 HC ICU R&B

## 2019-12-03 PROCEDURE — 2580000003 HC RX 258: Performed by: PEDIATRICS

## 2019-12-03 PROCEDURE — 97127 HC SP THER IVNTJ W/FOCUS COG FUNCJ: CPT

## 2019-12-03 PROCEDURE — 70450 CT HEAD/BRAIN W/O DYE: CPT

## 2019-12-03 PROCEDURE — 2500000003 HC RX 250 WO HCPCS: Performed by: PEDIATRICS

## 2019-12-03 PROCEDURE — 6360000002 HC RX W HCPCS: Performed by: INTERNAL MEDICINE

## 2019-12-03 PROCEDURE — 92610 EVALUATE SWALLOWING FUNCTION: CPT

## 2019-12-03 PROCEDURE — 6370000000 HC RX 637 (ALT 250 FOR IP): Performed by: INTERNAL MEDICINE

## 2019-12-03 PROCEDURE — 97166 OT EVAL MOD COMPLEX 45 MIN: CPT

## 2019-12-03 PROCEDURE — 94760 N-INVAS EAR/PLS OXIMETRY 1: CPT

## 2019-12-03 RX ORDER — HYDRALAZINE HYDROCHLORIDE 20 MG/ML
10 INJECTION INTRAMUSCULAR; INTRAVENOUS EVERY 6 HOURS PRN
Status: DISCONTINUED | OUTPATIENT
Start: 2019-12-03 | End: 2019-12-03 | Stop reason: SDUPTHER

## 2019-12-03 RX ORDER — ATORVASTATIN CALCIUM 80 MG/1
80 TABLET, FILM COATED ORAL NIGHTLY
Status: DISCONTINUED | OUTPATIENT
Start: 2019-12-03 | End: 2019-12-05 | Stop reason: HOSPADM

## 2019-12-03 RX ORDER — ACETAMINOPHEN 325 MG/1
650 TABLET ORAL EVERY 4 HOURS PRN
Status: DISCONTINUED | OUTPATIENT
Start: 2019-12-03 | End: 2019-12-05 | Stop reason: HOSPADM

## 2019-12-03 RX ORDER — OXYCODONE HYDROCHLORIDE 5 MG/1
2.5 TABLET ORAL EVERY 6 HOURS PRN
Status: DISCONTINUED | OUTPATIENT
Start: 2019-12-03 | End: 2019-12-05 | Stop reason: HOSPADM

## 2019-12-03 RX ORDER — SODIUM CHLORIDE, SODIUM LACTATE, POTASSIUM CHLORIDE, AND CALCIUM CHLORIDE .6; .31; .03; .02 G/100ML; G/100ML; G/100ML; G/100ML
500 INJECTION, SOLUTION INTRAVENOUS ONCE
Status: DISCONTINUED | OUTPATIENT
Start: 2019-12-03 | End: 2019-12-05 | Stop reason: HOSPADM

## 2019-12-03 RX ORDER — HYDRALAZINE HYDROCHLORIDE 20 MG/ML
10 INJECTION INTRAMUSCULAR; INTRAVENOUS EVERY 6 HOURS PRN
Status: DISCONTINUED | OUTPATIENT
Start: 2019-12-03 | End: 2019-12-05 | Stop reason: HOSPADM

## 2019-12-03 RX ORDER — ASPIRIN 81 MG/1
81 TABLET ORAL DAILY
Status: DISCONTINUED | OUTPATIENT
Start: 2019-12-03 | End: 2019-12-03

## 2019-12-03 RX ORDER — NICOTINE POLACRILEX 4 MG
15 LOZENGE BUCCAL PRN
Status: DISCONTINUED | OUTPATIENT
Start: 2019-12-03 | End: 2019-12-05 | Stop reason: HOSPADM

## 2019-12-03 RX ORDER — DEXTROSE MONOHYDRATE 50 MG/ML
100 INJECTION, SOLUTION INTRAVENOUS PRN
Status: DISCONTINUED | OUTPATIENT
Start: 2019-12-03 | End: 2019-12-05 | Stop reason: HOSPADM

## 2019-12-03 RX ORDER — DEXTROSE MONOHYDRATE 25 G/50ML
12.5 INJECTION, SOLUTION INTRAVENOUS PRN
Status: DISCONTINUED | OUTPATIENT
Start: 2019-12-03 | End: 2019-12-05 | Stop reason: HOSPADM

## 2019-12-03 RX ORDER — ATENOLOL 50 MG/1
50 TABLET ORAL DAILY
Status: DISCONTINUED | OUTPATIENT
Start: 2019-12-03 | End: 2019-12-05 | Stop reason: HOSPADM

## 2019-12-03 RX ORDER — QUETIAPINE FUMARATE 25 MG/1
50 TABLET, FILM COATED ORAL NIGHTLY PRN
Status: DISCONTINUED | OUTPATIENT
Start: 2019-12-03 | End: 2019-12-05 | Stop reason: HOSPADM

## 2019-12-03 RX ORDER — ALPRAZOLAM 0.5 MG/1
1 TABLET ORAL 2 TIMES DAILY PRN
Status: DISCONTINUED | OUTPATIENT
Start: 2019-12-03 | End: 2019-12-05 | Stop reason: HOSPADM

## 2019-12-03 RX ORDER — QUETIAPINE FUMARATE 100 MG/1
100 TABLET, FILM COATED ORAL NIGHTLY PRN
COMMUNITY

## 2019-12-03 RX ORDER — INSULIN GLARGINE 100 [IU]/ML
10 INJECTION, SOLUTION SUBCUTANEOUS NIGHTLY
Status: DISCONTINUED | OUTPATIENT
Start: 2019-12-03 | End: 2019-12-05 | Stop reason: HOSPADM

## 2019-12-03 RX ORDER — LABETALOL HYDROCHLORIDE 5 MG/ML
10 INJECTION, SOLUTION INTRAVENOUS ONCE
Status: COMPLETED | OUTPATIENT
Start: 2019-12-03 | End: 2019-12-03

## 2019-12-03 RX ORDER — ACETAMINOPHEN, ASPIRIN AND CAFFEINE 250; 250; 65 MG/1; MG/1; MG/1
1 TABLET, FILM COATED ORAL EVERY 6 HOURS PRN
Status: DISCONTINUED | OUTPATIENT
Start: 2019-12-03 | End: 2019-12-03

## 2019-12-03 RX ORDER — PANTOPRAZOLE SODIUM 40 MG/1
40 TABLET, DELAYED RELEASE ORAL
Status: DISCONTINUED | OUTPATIENT
Start: 2019-12-03 | End: 2019-12-05 | Stop reason: HOSPADM

## 2019-12-03 RX ORDER — DIPHENHYDRAMINE HYDROCHLORIDE 50 MG/ML
25 INJECTION INTRAMUSCULAR; INTRAVENOUS ONCE
Status: DISCONTINUED | OUTPATIENT
Start: 2019-12-03 | End: 2019-12-05 | Stop reason: HOSPADM

## 2019-12-03 RX ORDER — ATENOLOL 25 MG/1
25 TABLET ORAL DAILY
Status: DISCONTINUED | OUTPATIENT
Start: 2019-12-03 | End: 2019-12-03 | Stop reason: DRUGHIGH

## 2019-12-03 RX ORDER — SODIUM CHLORIDE 0.9 % (FLUSH) 0.9 %
10 SYRINGE (ML) INJECTION PRN
Status: DISCONTINUED | OUTPATIENT
Start: 2019-12-03 | End: 2019-12-05 | Stop reason: HOSPADM

## 2019-12-03 RX ORDER — LABETALOL HYDROCHLORIDE 5 MG/ML
10 INJECTION, SOLUTION INTRAVENOUS EVERY 4 HOURS PRN
Status: DISCONTINUED | OUTPATIENT
Start: 2019-12-03 | End: 2019-12-03

## 2019-12-03 RX ORDER — FENOFIBRATE 160 MG/1
160 TABLET ORAL DAILY
Status: DISCONTINUED | OUTPATIENT
Start: 2019-12-03 | End: 2019-12-05 | Stop reason: HOSPADM

## 2019-12-03 RX ORDER — ONDANSETRON 2 MG/ML
4 INJECTION INTRAMUSCULAR; INTRAVENOUS EVERY 6 HOURS PRN
Status: DISCONTINUED | OUTPATIENT
Start: 2019-12-03 | End: 2019-12-05 | Stop reason: HOSPADM

## 2019-12-03 RX ORDER — ASPIRIN 81 MG/1
81 TABLET ORAL DAILY
Status: DISCONTINUED | OUTPATIENT
Start: 2019-12-04 | End: 2019-12-03

## 2019-12-03 RX ORDER — LABETALOL HYDROCHLORIDE 5 MG/ML
10 INJECTION, SOLUTION INTRAVENOUS
Status: DISCONTINUED | OUTPATIENT
Start: 2019-12-03 | End: 2019-12-05 | Stop reason: HOSPADM

## 2019-12-03 RX ORDER — LISINOPRIL 20 MG/1
20 TABLET ORAL DAILY
Status: DISCONTINUED | OUTPATIENT
Start: 2019-12-03 | End: 2019-12-05 | Stop reason: HOSPADM

## 2019-12-03 RX ORDER — HYDRALAZINE HYDROCHLORIDE 20 MG/ML
10 INJECTION INTRAMUSCULAR; INTRAVENOUS ONCE
Status: COMPLETED | OUTPATIENT
Start: 2019-12-03 | End: 2019-12-03

## 2019-12-03 RX ORDER — DULOXETIN HYDROCHLORIDE 60 MG/1
60 CAPSULE, DELAYED RELEASE ORAL DAILY
Status: DISCONTINUED | OUTPATIENT
Start: 2019-12-03 | End: 2019-12-05 | Stop reason: HOSPADM

## 2019-12-03 RX ORDER — SODIUM CHLORIDE 0.9 % (FLUSH) 0.9 %
10 SYRINGE (ML) INJECTION EVERY 12 HOURS SCHEDULED
Status: DISCONTINUED | OUTPATIENT
Start: 2019-12-03 | End: 2019-12-05 | Stop reason: HOSPADM

## 2019-12-03 RX ORDER — PROCHLORPERAZINE EDISYLATE 5 MG/ML
10 INJECTION INTRAMUSCULAR; INTRAVENOUS ONCE
Status: DISCONTINUED | OUTPATIENT
Start: 2019-12-03 | End: 2019-12-05 | Stop reason: HOSPADM

## 2019-12-03 RX ADMIN — INSULIN LISPRO 1 UNITS: 100 INJECTION, SOLUTION INTRAVENOUS; SUBCUTANEOUS at 08:08

## 2019-12-03 RX ADMIN — OXYCODONE HYDROCHLORIDE 2.5 MG: 5 TABLET ORAL at 18:06

## 2019-12-03 RX ADMIN — HYDRALAZINE HYDROCHLORIDE 10 MG: 20 INJECTION INTRAMUSCULAR; INTRAVENOUS at 14:28

## 2019-12-03 RX ADMIN — FENOFIBRATE 160 MG: 160 TABLET ORAL at 08:06

## 2019-12-03 RX ADMIN — DULOXETINE HYDROCHLORIDE 60 MG: 60 CAPSULE, DELAYED RELEASE ORAL at 08:06

## 2019-12-03 RX ADMIN — LABETALOL HYDROCHLORIDE 10 MG: 5 INJECTION INTRAVENOUS at 11:20

## 2019-12-03 RX ADMIN — INSULIN LISPRO 1 UNITS: 100 INJECTION, SOLUTION INTRAVENOUS; SUBCUTANEOUS at 18:47

## 2019-12-03 RX ADMIN — DEXTROSE MONOHYDRATE 5 MG/HR: 50 INJECTION, SOLUTION INTRAVENOUS at 21:37

## 2019-12-03 RX ADMIN — HYDRALAZINE HYDROCHLORIDE 10 MG: 20 INJECTION INTRAMUSCULAR; INTRAVENOUS at 17:27

## 2019-12-03 RX ADMIN — ACETAMINOPHEN, ASPIRIN AND CAFFEINE 1 TABLET: 250; 250; 65 TABLET, FILM COATED ORAL at 16:39

## 2019-12-03 RX ADMIN — PANTOPRAZOLE SODIUM 40 MG: 40 TABLET, DELAYED RELEASE ORAL at 07:00

## 2019-12-03 RX ADMIN — INSULIN GLARGINE 10 UNITS: 100 INJECTION, SOLUTION SUBCUTANEOUS at 20:58

## 2019-12-03 RX ADMIN — ACETAMINOPHEN 650 MG: 325 TABLET ORAL at 10:22

## 2019-12-03 RX ADMIN — ATENOLOL 50 MG: 50 TABLET ORAL at 08:06

## 2019-12-03 RX ADMIN — SODIUM CHLORIDE, PRESERVATIVE FREE 10 ML: 5 INJECTION INTRAVENOUS at 08:08

## 2019-12-03 RX ADMIN — LABETALOL HYDROCHLORIDE 10 MG: 5 INJECTION INTRAVENOUS at 15:44

## 2019-12-03 RX ADMIN — INSULIN LISPRO 6 UNITS: 100 INJECTION, SOLUTION INTRAVENOUS; SUBCUTANEOUS at 08:07

## 2019-12-03 RX ADMIN — ATORVASTATIN CALCIUM 80 MG: 80 TABLET, FILM COATED ORAL at 20:58

## 2019-12-03 RX ADMIN — LABETALOL HYDROCHLORIDE 10 MG: 5 INJECTION INTRAVENOUS at 20:58

## 2019-12-03 RX ADMIN — INSULIN LISPRO 6 UNITS: 100 INJECTION, SOLUTION INTRAVENOUS; SUBCUTANEOUS at 18:47

## 2019-12-03 RX ADMIN — LABETALOL HYDROCHLORIDE 10 MG: 5 INJECTION INTRAVENOUS at 16:46

## 2019-12-03 RX ADMIN — ACETAMINOPHEN 650 MG: 325 TABLET ORAL at 15:17

## 2019-12-03 RX ADMIN — LISINOPRIL 20 MG: 20 TABLET ORAL at 14:28

## 2019-12-03 RX ADMIN — SODIUM CHLORIDE, PRESERVATIVE FREE 10 ML: 5 INJECTION INTRAVENOUS at 20:59

## 2019-12-03 ASSESSMENT — PAIN SCALES - GENERAL
PAINLEVEL_OUTOF10: 5
PAINLEVEL_OUTOF10: 9
PAINLEVEL_OUTOF10: 0
PAINLEVEL_OUTOF10: 6
PAINLEVEL_OUTOF10: 0
PAINLEVEL_OUTOF10: 10
PAINLEVEL_OUTOF10: 0
PAINLEVEL_OUTOF10: 10
PAINLEVEL_OUTOF10: 0

## 2019-12-03 ASSESSMENT — PAIN DESCRIPTION - ORIENTATION
ORIENTATION: MID
ORIENTATION: MID;UPPER
ORIENTATION: MID
ORIENTATION: MID;LOWER
ORIENTATION: MID

## 2019-12-03 ASSESSMENT — PAIN DESCRIPTION - FREQUENCY
FREQUENCY: CONTINUOUS
FREQUENCY: CONTINUOUS

## 2019-12-03 ASSESSMENT — PAIN DESCRIPTION - PAIN TYPE
TYPE: ACUTE PAIN

## 2019-12-03 ASSESSMENT — PAIN DESCRIPTION - LOCATION
LOCATION: HEAD

## 2019-12-03 ASSESSMENT — PAIN - FUNCTIONAL ASSESSMENT
PAIN_FUNCTIONAL_ASSESSMENT: PREVENTS OR INTERFERES WITH MANY ACTIVE NOT PASSIVE ACTIVITIES
PAIN_FUNCTIONAL_ASSESSMENT: PREVENTS OR INTERFERES SOME ACTIVE ACTIVITIES AND ADLS

## 2019-12-03 ASSESSMENT — PAIN DESCRIPTION - DESCRIPTORS
DESCRIPTORS: CONSTANT
DESCRIPTORS: ACHING

## 2019-12-03 ASSESSMENT — PAIN DESCRIPTION - ONSET
ONSET: PROGRESSIVE
ONSET: ON-GOING

## 2019-12-03 ASSESSMENT — PAIN DESCRIPTION - PROGRESSION
CLINICAL_PROGRESSION: NOT CHANGED
CLINICAL_PROGRESSION: RAPIDLY WORSENING

## 2019-12-04 LAB
ANION GAP SERPL CALCULATED.3IONS-SCNC: 17 MMOL/L (ref 3–16)
BUN BLDV-MCNC: 11 MG/DL (ref 7–20)
CALCIUM SERPL-MCNC: 10 MG/DL (ref 8.3–10.6)
CHLORIDE BLD-SCNC: 96 MMOL/L (ref 99–110)
CHOLESTEROL, TOTAL: 148 MG/DL (ref 0–199)
CO2: 20 MMOL/L (ref 21–32)
CREAT SERPL-MCNC: 0.6 MG/DL (ref 0.6–1.2)
EKG ATRIAL RATE: 93 BPM
EKG DIAGNOSIS: NORMAL
EKG P AXIS: 52 DEGREES
EKG P-R INTERVAL: 186 MS
EKG Q-T INTERVAL: 366 MS
EKG QRS DURATION: 86 MS
EKG QTC CALCULATION (BAZETT): 455 MS
EKG R AXIS: -7 DEGREES
EKG T AXIS: 126 DEGREES
EKG VENTRICULAR RATE: 93 BPM
ESTIMATED AVERAGE GLUCOSE: 139.9 MG/DL
GFR AFRICAN AMERICAN: >60
GFR NON-AFRICAN AMERICAN: >60
GLUCOSE BLD-MCNC: 117 MG/DL (ref 70–99)
GLUCOSE BLD-MCNC: 148 MG/DL (ref 70–99)
GLUCOSE BLD-MCNC: 158 MG/DL (ref 70–99)
GLUCOSE BLD-MCNC: 166 MG/DL (ref 70–99)
GLUCOSE BLD-MCNC: 177 MG/DL (ref 70–99)
HBA1C MFR BLD: 6.5 %
HCT VFR BLD CALC: 42 % (ref 36–48)
HDLC SERPL-MCNC: 50 MG/DL (ref 40–60)
HEMOGLOBIN: 14.3 G/DL (ref 12–16)
LDL CHOLESTEROL CALCULATED: 70 MG/DL
LV EF: 60 %
LVEF MODALITY: NORMAL
MCH RBC QN AUTO: 31.7 PG (ref 26–34)
MCHC RBC AUTO-ENTMCNC: 34.1 G/DL (ref 31–36)
MCV RBC AUTO: 92.9 FL (ref 80–100)
PDW BLD-RTO: 13.2 % (ref 12.4–15.4)
PERFORMED ON: ABNORMAL
PLATELET # BLD: 320 K/UL (ref 135–450)
PMV BLD AUTO: 7.9 FL (ref 5–10.5)
POTASSIUM SERPL-SCNC: 3.9 MMOL/L (ref 3.5–5.1)
RBC # BLD: 4.52 M/UL (ref 4–5.2)
SODIUM BLD-SCNC: 133 MMOL/L (ref 136–145)
TRIGL SERPL-MCNC: 138 MG/DL (ref 0–150)
VLDLC SERPL CALC-MCNC: 28 MG/DL
WBC # BLD: 9.9 K/UL (ref 4–11)

## 2019-12-04 PROCEDURE — 6360000002 HC RX W HCPCS: Performed by: INTERNAL MEDICINE

## 2019-12-04 PROCEDURE — 2500000003 HC RX 250 WO HCPCS: Performed by: INTERNAL MEDICINE

## 2019-12-04 PROCEDURE — 2000000000 HC ICU R&B

## 2019-12-04 PROCEDURE — 93306 TTE W/DOPPLER COMPLETE: CPT

## 2019-12-04 PROCEDURE — 6370000000 HC RX 637 (ALT 250 FOR IP): Performed by: NURSE PRACTITIONER

## 2019-12-04 PROCEDURE — 83036 HEMOGLOBIN GLYCOSYLATED A1C: CPT

## 2019-12-04 PROCEDURE — 93010 ELECTROCARDIOGRAM REPORT: CPT | Performed by: INTERNAL MEDICINE

## 2019-12-04 PROCEDURE — 97116 GAIT TRAINING THERAPY: CPT

## 2019-12-04 PROCEDURE — 6370000000 HC RX 637 (ALT 250 FOR IP): Performed by: PEDIATRICS

## 2019-12-04 PROCEDURE — 6360000002 HC RX W HCPCS: Performed by: PEDIATRICS

## 2019-12-04 PROCEDURE — 97110 THERAPEUTIC EXERCISES: CPT

## 2019-12-04 PROCEDURE — 97530 THERAPEUTIC ACTIVITIES: CPT

## 2019-12-04 PROCEDURE — 2580000003 HC RX 258: Performed by: PEDIATRICS

## 2019-12-04 PROCEDURE — 85027 COMPLETE CBC AUTOMATED: CPT

## 2019-12-04 PROCEDURE — 2580000003 HC RX 258: Performed by: INTERNAL MEDICINE

## 2019-12-04 PROCEDURE — 80048 BASIC METABOLIC PNL TOTAL CA: CPT

## 2019-12-04 PROCEDURE — 36415 COLL VENOUS BLD VENIPUNCTURE: CPT

## 2019-12-04 PROCEDURE — 6370000000 HC RX 637 (ALT 250 FOR IP): Performed by: INTERNAL MEDICINE

## 2019-12-04 PROCEDURE — 80061 LIPID PANEL: CPT

## 2019-12-04 RX ORDER — ASPIRIN 81 MG/1
81 TABLET ORAL DAILY
Status: DISCONTINUED | OUTPATIENT
Start: 2019-12-04 | End: 2019-12-05 | Stop reason: HOSPADM

## 2019-12-04 RX ORDER — LANOLIN ALCOHOL/MO/W.PET/CERES
3 CREAM (GRAM) TOPICAL NIGHTLY PRN
Status: DISCONTINUED | OUTPATIENT
Start: 2019-12-04 | End: 2019-12-05 | Stop reason: HOSPADM

## 2019-12-04 RX ADMIN — FENOFIBRATE 160 MG: 160 TABLET ORAL at 08:54

## 2019-12-04 RX ADMIN — SODIUM CHLORIDE, PRESERVATIVE FREE 10 ML: 5 INJECTION INTRAVENOUS at 20:06

## 2019-12-04 RX ADMIN — INSULIN LISPRO 1 UNITS: 100 INJECTION, SOLUTION INTRAVENOUS; SUBCUTANEOUS at 17:31

## 2019-12-04 RX ADMIN — INSULIN LISPRO 6 UNITS: 100 INJECTION, SOLUTION INTRAVENOUS; SUBCUTANEOUS at 12:34

## 2019-12-04 RX ADMIN — ONDANSETRON 4 MG: 2 INJECTION INTRAMUSCULAR; INTRAVENOUS at 07:52

## 2019-12-04 RX ADMIN — ATORVASTATIN CALCIUM 80 MG: 80 TABLET, FILM COATED ORAL at 20:02

## 2019-12-04 RX ADMIN — ENOXAPARIN SODIUM 40 MG: 40 INJECTION SUBCUTANEOUS at 08:54

## 2019-12-04 RX ADMIN — DULOXETINE HYDROCHLORIDE 60 MG: 60 CAPSULE, DELAYED RELEASE ORAL at 08:54

## 2019-12-04 RX ADMIN — OXYCODONE HYDROCHLORIDE 2.5 MG: 5 TABLET ORAL at 12:32

## 2019-12-04 RX ADMIN — INSULIN LISPRO 1 UNITS: 100 INJECTION, SOLUTION INTRAVENOUS; SUBCUTANEOUS at 08:55

## 2019-12-04 RX ADMIN — PANTOPRAZOLE SODIUM 40 MG: 40 TABLET, DELAYED RELEASE ORAL at 09:22

## 2019-12-04 RX ADMIN — ATENOLOL 50 MG: 50 TABLET ORAL at 08:54

## 2019-12-04 RX ADMIN — INSULIN LISPRO 6 UNITS: 100 INJECTION, SOLUTION INTRAVENOUS; SUBCUTANEOUS at 08:54

## 2019-12-04 RX ADMIN — INSULIN LISPRO 1 UNITS: 100 INJECTION, SOLUTION INTRAVENOUS; SUBCUTANEOUS at 12:31

## 2019-12-04 RX ADMIN — ASPIRIN 81 MG: 81 TABLET, COATED ORAL at 08:54

## 2019-12-04 RX ADMIN — ONDANSETRON 4 MG: 2 INJECTION INTRAMUSCULAR; INTRAVENOUS at 12:18

## 2019-12-04 RX ADMIN — DEXTROSE MONOHYDRATE 2.5 MG/HR: 50 INJECTION, SOLUTION INTRAVENOUS at 15:17

## 2019-12-04 RX ADMIN — SODIUM CHLORIDE, PRESERVATIVE FREE 10 ML: 5 INJECTION INTRAVENOUS at 09:22

## 2019-12-04 RX ADMIN — INSULIN LISPRO 6 UNITS: 100 INJECTION, SOLUTION INTRAVENOUS; SUBCUTANEOUS at 17:31

## 2019-12-04 RX ADMIN — MELATONIN 3 MG: at 20:01

## 2019-12-04 RX ADMIN — ONDANSETRON 4 MG: 2 INJECTION INTRAMUSCULAR; INTRAVENOUS at 19:19

## 2019-12-04 RX ADMIN — INSULIN GLARGINE 10 UNITS: 100 INJECTION, SOLUTION SUBCUTANEOUS at 20:02

## 2019-12-04 ASSESSMENT — PAIN SCALES - GENERAL
PAINLEVEL_OUTOF10: 0
PAINLEVEL_OUTOF10: 6

## 2019-12-04 ASSESSMENT — PAIN DESCRIPTION - LOCATION: LOCATION: HEAD

## 2019-12-04 ASSESSMENT — PAIN DESCRIPTION - ORIENTATION: ORIENTATION: MID

## 2019-12-04 ASSESSMENT — PAIN DESCRIPTION - PAIN TYPE: TYPE: ACUTE PAIN

## 2019-12-05 ENCOUNTER — HOSPITAL ENCOUNTER (INPATIENT)
Age: 74
LOS: 8 days | Discharge: HOME | DRG: 057 | End: 2019-12-13
Attending: PHYSICAL MEDICINE & REHABILITATION | Admitting: PHYSICAL MEDICINE & REHABILITATION
Payer: MEDICARE

## 2019-12-05 VITALS
RESPIRATION RATE: 16 BRPM | DIASTOLIC BLOOD PRESSURE: 77 MMHG | HEART RATE: 68 BPM | SYSTOLIC BLOOD PRESSURE: 160 MMHG | TEMPERATURE: 97.9 F | WEIGHT: 161.6 LBS | HEIGHT: 66 IN | OXYGEN SATURATION: 97 % | BODY MASS INDEX: 25.97 KG/M2

## 2019-12-05 DIAGNOSIS — I63.89: Primary | ICD-10-CM

## 2019-12-05 LAB
ANION GAP SERPL CALCULATED.3IONS-SCNC: 16 MMOL/L (ref 3–16)
BUN BLDV-MCNC: 14 MG/DL (ref 7–20)
CALCIUM SERPL-MCNC: 9.7 MG/DL (ref 8.3–10.6)
CHLORIDE BLD-SCNC: 95 MMOL/L (ref 99–110)
CO2: 21 MMOL/L (ref 21–32)
CREAT SERPL-MCNC: 0.8 MG/DL (ref 0.6–1.2)
GFR AFRICAN AMERICAN: >60
GFR NON-AFRICAN AMERICAN: >60
GLUCOSE BLD-MCNC: 124 MG/DL (ref 70–99)
GLUCOSE BLD-MCNC: 146 MG/DL (ref 70–99)
GLUCOSE BLD-MCNC: 155 MG/DL (ref 70–99)
GLUCOSE BLD-MCNC: 157 MG/DL (ref 70–99)
GLUCOSE BLD-MCNC: 159 MG/DL (ref 70–99)
PERFORMED ON: ABNORMAL
POTASSIUM SERPL-SCNC: 4 MMOL/L (ref 3.5–5.1)
SODIUM BLD-SCNC: 132 MMOL/L (ref 136–145)

## 2019-12-05 PROCEDURE — 6370000000 HC RX 637 (ALT 250 FOR IP): Performed by: PEDIATRICS

## 2019-12-05 PROCEDURE — 6360000002 HC RX W HCPCS: Performed by: PEDIATRICS

## 2019-12-05 PROCEDURE — 94760 N-INVAS EAR/PLS OXIMETRY 1: CPT

## 2019-12-05 PROCEDURE — 6370000000 HC RX 637 (ALT 250 FOR IP): Performed by: NURSE PRACTITIONER

## 2019-12-05 PROCEDURE — 36415 COLL VENOUS BLD VENIPUNCTURE: CPT

## 2019-12-05 PROCEDURE — 92523 SPEECH SOUND LANG COMPREHEN: CPT

## 2019-12-05 PROCEDURE — 97116 GAIT TRAINING THERAPY: CPT

## 2019-12-05 PROCEDURE — 2580000003 HC RX 258: Performed by: PEDIATRICS

## 2019-12-05 PROCEDURE — 97166 OT EVAL MOD COMPLEX 45 MIN: CPT

## 2019-12-05 PROCEDURE — 80048 BASIC METABOLIC PNL TOTAL CA: CPT

## 2019-12-05 PROCEDURE — 97530 THERAPEUTIC ACTIVITIES: CPT

## 2019-12-05 PROCEDURE — 1280000000 HC REHAB R&B

## 2019-12-05 PROCEDURE — 6370000000 HC RX 637 (ALT 250 FOR IP): Performed by: INTERNAL MEDICINE

## 2019-12-05 PROCEDURE — 97162 PT EVAL MOD COMPLEX 30 MIN: CPT

## 2019-12-05 PROCEDURE — 2580000003 HC RX 258: Performed by: PHYSICAL MEDICINE & REHABILITATION

## 2019-12-05 PROCEDURE — 6370000000 HC RX 637 (ALT 250 FOR IP): Performed by: PHYSICAL MEDICINE & REHABILITATION

## 2019-12-05 PROCEDURE — 6360000002 HC RX W HCPCS: Performed by: INTERNAL MEDICINE

## 2019-12-05 PROCEDURE — 97535 SELF CARE MNGMENT TRAINING: CPT

## 2019-12-05 RX ORDER — NICOTINE POLACRILEX 4 MG
15 LOZENGE BUCCAL PRN
Status: DISCONTINUED | OUTPATIENT
Start: 2019-12-05 | End: 2019-12-13 | Stop reason: HOSPADM

## 2019-12-05 RX ORDER — BISACODYL 10 MG
10 SUPPOSITORY, RECTAL RECTAL DAILY PRN
Status: CANCELLED | OUTPATIENT
Start: 2019-12-05

## 2019-12-05 RX ORDER — ASPIRIN 81 MG/1
81 TABLET ORAL DAILY
Status: DISCONTINUED | OUTPATIENT
Start: 2019-12-06 | End: 2019-12-05

## 2019-12-05 RX ORDER — AMLODIPINE BESYLATE 5 MG/1
5 TABLET ORAL DAILY
Status: DISCONTINUED | OUTPATIENT
Start: 2019-12-06 | End: 2019-12-06

## 2019-12-05 RX ORDER — ATENOLOL 50 MG/1
50 TABLET ORAL DAILY
Status: DISCONTINUED | OUTPATIENT
Start: 2019-12-06 | End: 2019-12-13 | Stop reason: HOSPADM

## 2019-12-05 RX ORDER — HYDRALAZINE HYDROCHLORIDE 20 MG/ML
10 INJECTION INTRAMUSCULAR; INTRAVENOUS EVERY 6 HOURS PRN
Status: DISCONTINUED | OUTPATIENT
Start: 2019-12-05 | End: 2019-12-06

## 2019-12-05 RX ORDER — SODIUM CHLORIDE 0.9 % (FLUSH) 0.9 %
10 SYRINGE (ML) INJECTION EVERY 12 HOURS SCHEDULED
Status: CANCELLED | OUTPATIENT
Start: 2019-12-05

## 2019-12-05 RX ORDER — LANOLIN ALCOHOL/MO/W.PET/CERES
3 CREAM (GRAM) TOPICAL NIGHTLY PRN
Status: CANCELLED | OUTPATIENT
Start: 2019-12-05

## 2019-12-05 RX ORDER — DEXTROSE MONOHYDRATE 50 MG/ML
100 INJECTION, SOLUTION INTRAVENOUS PRN
Status: CANCELLED | OUTPATIENT
Start: 2019-12-05

## 2019-12-05 RX ORDER — AMLODIPINE BESYLATE 5 MG/1
5 TABLET ORAL DAILY
Status: DISCONTINUED | OUTPATIENT
Start: 2019-12-05 | End: 2019-12-05 | Stop reason: HOSPADM

## 2019-12-05 RX ORDER — INSULIN GLARGINE 100 [IU]/ML
10 INJECTION, SOLUTION SUBCUTANEOUS NIGHTLY
Status: DISCONTINUED | OUTPATIENT
Start: 2019-12-05 | End: 2019-12-13 | Stop reason: HOSPADM

## 2019-12-05 RX ORDER — FENOFIBRATE 160 MG/1
160 TABLET ORAL DAILY
Status: CANCELLED | OUTPATIENT
Start: 2019-12-06

## 2019-12-05 RX ORDER — DULOXETIN HYDROCHLORIDE 60 MG/1
60 CAPSULE, DELAYED RELEASE ORAL DAILY
Status: CANCELLED | OUTPATIENT
Start: 2019-12-06

## 2019-12-05 RX ORDER — ACETAMINOPHEN 325 MG/1
650 TABLET ORAL EVERY 4 HOURS PRN
Status: DISCONTINUED | OUTPATIENT
Start: 2019-12-05 | End: 2019-12-13 | Stop reason: HOSPADM

## 2019-12-05 RX ORDER — ATENOLOL 50 MG/1
50 TABLET ORAL DAILY
Status: CANCELLED | OUTPATIENT
Start: 2019-12-06

## 2019-12-05 RX ORDER — LISINOPRIL 20 MG/1
20 TABLET ORAL DAILY
Status: DISCONTINUED | OUTPATIENT
Start: 2019-12-06 | End: 2019-12-13 | Stop reason: HOSPADM

## 2019-12-05 RX ORDER — LISINOPRIL 20 MG/1
20 TABLET ORAL DAILY
Status: CANCELLED | OUTPATIENT
Start: 2019-12-06

## 2019-12-05 RX ORDER — SODIUM CHLORIDE 0.9 % (FLUSH) 0.9 %
10 SYRINGE (ML) INJECTION PRN
Status: DISCONTINUED | OUTPATIENT
Start: 2019-12-05 | End: 2019-12-06

## 2019-12-05 RX ORDER — ATORVASTATIN CALCIUM 80 MG/1
80 TABLET, FILM COATED ORAL NIGHTLY
Status: DISCONTINUED | OUTPATIENT
Start: 2019-12-05 | End: 2019-12-13 | Stop reason: HOSPADM

## 2019-12-05 RX ORDER — SENNA AND DOCUSATE SODIUM 50; 8.6 MG/1; MG/1
2 TABLET, FILM COATED ORAL 2 TIMES DAILY
Status: CANCELLED | OUTPATIENT
Start: 2019-12-05

## 2019-12-05 RX ORDER — SODIUM CHLORIDE 0.9 % (FLUSH) 0.9 %
10 SYRINGE (ML) INJECTION EVERY 12 HOURS SCHEDULED
Status: DISCONTINUED | OUTPATIENT
Start: 2019-12-05 | End: 2019-12-06

## 2019-12-05 RX ORDER — NICOTINE POLACRILEX 4 MG
15 LOZENGE BUCCAL PRN
Status: CANCELLED | OUTPATIENT
Start: 2019-12-05

## 2019-12-05 RX ORDER — INSULIN GLARGINE 100 [IU]/ML
10 INJECTION, SOLUTION SUBCUTANEOUS NIGHTLY
Status: CANCELLED | OUTPATIENT
Start: 2019-12-05

## 2019-12-05 RX ORDER — DULOXETIN HYDROCHLORIDE 60 MG/1
60 CAPSULE, DELAYED RELEASE ORAL DAILY
Status: DISCONTINUED | OUTPATIENT
Start: 2019-12-06 | End: 2019-12-13 | Stop reason: HOSPADM

## 2019-12-05 RX ORDER — ATORVASTATIN CALCIUM 80 MG/1
80 TABLET, FILM COATED ORAL NIGHTLY
Qty: 30 TABLET | Refills: 0 | Status: ON HOLD | OUTPATIENT
Start: 2019-12-05 | End: 2020-03-18

## 2019-12-05 RX ORDER — INSULIN GLARGINE 100 [IU]/ML
10 INJECTION, SOLUTION SUBCUTANEOUS NIGHTLY
Qty: 1 VIAL | Refills: 0 | Status: ON HOLD | OUTPATIENT
Start: 2019-12-05 | End: 2019-12-12 | Stop reason: HOSPADM

## 2019-12-05 RX ORDER — DEXTROSE MONOHYDRATE 25 G/50ML
12.5 INJECTION, SOLUTION INTRAVENOUS PRN
Status: CANCELLED | OUTPATIENT
Start: 2019-12-05

## 2019-12-05 RX ORDER — FENOFIBRATE 160 MG/1
160 TABLET ORAL DAILY
Status: DISCONTINUED | OUTPATIENT
Start: 2019-12-06 | End: 2019-12-13 | Stop reason: HOSPADM

## 2019-12-05 RX ORDER — SODIUM CHLORIDE 0.9 % (FLUSH) 0.9 %
10 SYRINGE (ML) INJECTION PRN
Status: CANCELLED | OUTPATIENT
Start: 2019-12-05

## 2019-12-05 RX ORDER — OXYCODONE HYDROCHLORIDE 5 MG/1
2.5 TABLET ORAL EVERY 6 HOURS PRN
Status: CANCELLED | OUTPATIENT
Start: 2019-12-05

## 2019-12-05 RX ORDER — QUETIAPINE FUMARATE 25 MG/1
50 TABLET, FILM COATED ORAL NIGHTLY PRN
Status: CANCELLED | OUTPATIENT
Start: 2019-12-05

## 2019-12-05 RX ORDER — ALPRAZOLAM 0.5 MG/1
1 TABLET ORAL 2 TIMES DAILY PRN
Status: CANCELLED | OUTPATIENT
Start: 2019-12-05

## 2019-12-05 RX ORDER — PANTOPRAZOLE SODIUM 40 MG/1
40 TABLET, DELAYED RELEASE ORAL
Status: DISCONTINUED | OUTPATIENT
Start: 2019-12-05 | End: 2019-12-13 | Stop reason: HOSPADM

## 2019-12-05 RX ORDER — QUETIAPINE FUMARATE 25 MG/1
50 TABLET, FILM COATED ORAL NIGHTLY PRN
Status: DISCONTINUED | OUTPATIENT
Start: 2019-12-05 | End: 2019-12-13 | Stop reason: HOSPADM

## 2019-12-05 RX ORDER — ONDANSETRON 4 MG/1
4 TABLET, FILM COATED ORAL EVERY 8 HOURS PRN
Status: CANCELLED | OUTPATIENT
Start: 2019-12-05

## 2019-12-05 RX ORDER — HYDRALAZINE HYDROCHLORIDE 20 MG/ML
10 INJECTION INTRAMUSCULAR; INTRAVENOUS EVERY 6 HOURS PRN
Status: CANCELLED | OUTPATIENT
Start: 2019-12-05

## 2019-12-05 RX ORDER — AMLODIPINE BESYLATE 5 MG/1
5 TABLET ORAL DAILY
Qty: 30 TABLET | Refills: 0 | Status: ON HOLD | OUTPATIENT
Start: 2019-12-05 | End: 2019-12-12 | Stop reason: HOSPADM

## 2019-12-05 RX ORDER — LANOLIN ALCOHOL/MO/W.PET/CERES
3 CREAM (GRAM) TOPICAL NIGHTLY PRN
Status: DISCONTINUED | OUTPATIENT
Start: 2019-12-05 | End: 2019-12-13 | Stop reason: HOSPADM

## 2019-12-05 RX ORDER — PANTOPRAZOLE SODIUM 40 MG/1
40 TABLET, DELAYED RELEASE ORAL
Status: CANCELLED | OUTPATIENT
Start: 2019-12-06

## 2019-12-05 RX ORDER — ONDANSETRON 4 MG/1
4 TABLET, FILM COATED ORAL EVERY 8 HOURS PRN
Status: DISCONTINUED | OUTPATIENT
Start: 2019-12-05 | End: 2019-12-13 | Stop reason: HOSPADM

## 2019-12-05 RX ORDER — DEXTROSE MONOHYDRATE 50 MG/ML
100 INJECTION, SOLUTION INTRAVENOUS PRN
Status: DISCONTINUED | OUTPATIENT
Start: 2019-12-05 | End: 2019-12-06

## 2019-12-05 RX ORDER — DEXTROSE MONOHYDRATE 25 G/50ML
12.5 INJECTION, SOLUTION INTRAVENOUS PRN
Status: DISCONTINUED | OUTPATIENT
Start: 2019-12-05 | End: 2019-12-06

## 2019-12-05 RX ORDER — SENNA AND DOCUSATE SODIUM 50; 8.6 MG/1; MG/1
2 TABLET, FILM COATED ORAL 2 TIMES DAILY
Status: DISCONTINUED | OUTPATIENT
Start: 2019-12-05 | End: 2019-12-13 | Stop reason: HOSPADM

## 2019-12-05 RX ORDER — ATORVASTATIN CALCIUM 80 MG/1
80 TABLET, FILM COATED ORAL NIGHTLY
Status: CANCELLED | OUTPATIENT
Start: 2019-12-05

## 2019-12-05 RX ORDER — PANTOPRAZOLE SODIUM 40 MG/1
40 TABLET, DELAYED RELEASE ORAL
Status: DISCONTINUED | OUTPATIENT
Start: 2019-12-06 | End: 2019-12-05

## 2019-12-05 RX ORDER — OXYCODONE HYDROCHLORIDE 5 MG/1
2.5 TABLET ORAL EVERY 6 HOURS PRN
Status: DISCONTINUED | OUTPATIENT
Start: 2019-12-05 | End: 2019-12-13 | Stop reason: HOSPADM

## 2019-12-05 RX ORDER — ALPRAZOLAM 0.5 MG/1
1 TABLET ORAL 2 TIMES DAILY PRN
Status: DISCONTINUED | OUTPATIENT
Start: 2019-12-05 | End: 2019-12-13 | Stop reason: HOSPADM

## 2019-12-05 RX ORDER — BISACODYL 10 MG
10 SUPPOSITORY, RECTAL RECTAL DAILY PRN
Status: DISCONTINUED | OUTPATIENT
Start: 2019-12-05 | End: 2019-12-13 | Stop reason: HOSPADM

## 2019-12-05 RX ORDER — CALCIUM CARBONATE 200(500)MG
500 TABLET,CHEWABLE ORAL 3 TIMES DAILY PRN
Status: DISCONTINUED | OUTPATIENT
Start: 2019-12-05 | End: 2019-12-13 | Stop reason: HOSPADM

## 2019-12-05 RX ORDER — ASPIRIN 81 MG/1
81 TABLET ORAL DAILY
Status: CANCELLED | OUTPATIENT
Start: 2019-12-06

## 2019-12-05 RX ORDER — AMLODIPINE BESYLATE 5 MG/1
5 TABLET ORAL DAILY
Status: CANCELLED | OUTPATIENT
Start: 2019-12-06

## 2019-12-05 RX ORDER — ACETAMINOPHEN 325 MG/1
650 TABLET ORAL EVERY 4 HOURS PRN
Status: CANCELLED | OUTPATIENT
Start: 2019-12-05

## 2019-12-05 RX ADMIN — LISINOPRIL 20 MG: 20 TABLET ORAL at 08:51

## 2019-12-05 RX ADMIN — ANTACID TABLETS 500 MG: 500 TABLET, CHEWABLE ORAL at 21:21

## 2019-12-05 RX ADMIN — FENOFIBRATE 160 MG: 160 TABLET ORAL at 08:51

## 2019-12-05 RX ADMIN — DULOXETINE HYDROCHLORIDE 60 MG: 60 CAPSULE, DELAYED RELEASE ORAL at 08:51

## 2019-12-05 RX ADMIN — ONDANSETRON HYDROCHLORIDE 4 MG: 4 TABLET, FILM COATED ORAL at 11:52

## 2019-12-05 RX ADMIN — PANTOPRAZOLE SODIUM 40 MG: 40 TABLET, DELAYED RELEASE ORAL at 06:34

## 2019-12-05 RX ADMIN — ENOXAPARIN SODIUM 40 MG: 40 INJECTION SUBCUTANEOUS at 08:51

## 2019-12-05 RX ADMIN — ATENOLOL 50 MG: 50 TABLET ORAL at 08:51

## 2019-12-05 RX ADMIN — SODIUM CHLORIDE, PRESERVATIVE FREE 10 ML: 5 INJECTION INTRAVENOUS at 21:21

## 2019-12-05 RX ADMIN — AMLODIPINE BESYLATE 5 MG: 5 TABLET ORAL at 08:51

## 2019-12-05 RX ADMIN — PANTOPRAZOLE SODIUM 40 MG: 40 TABLET, DELAYED RELEASE ORAL at 16:22

## 2019-12-05 RX ADMIN — ANTACID TABLETS 500 MG: 500 TABLET, CHEWABLE ORAL at 15:12

## 2019-12-05 RX ADMIN — ASPIRIN 81 MG: 81 TABLET, COATED ORAL at 08:51

## 2019-12-05 RX ADMIN — ONDANSETRON 4 MG: 2 INJECTION INTRAMUSCULAR; INTRAVENOUS at 05:48

## 2019-12-05 RX ADMIN — ATORVASTATIN CALCIUM 80 MG: 80 TABLET, FILM COATED ORAL at 21:21

## 2019-12-05 RX ADMIN — QUETIAPINE FUMARATE 50 MG: 25 TABLET ORAL at 21:21

## 2019-12-05 RX ADMIN — SODIUM CHLORIDE, PRESERVATIVE FREE 10 ML: 5 INJECTION INTRAVENOUS at 08:52

## 2019-12-05 RX ADMIN — INSULIN GLARGINE 10 UNITS: 100 INJECTION, SOLUTION SUBCUTANEOUS at 21:21

## 2019-12-05 RX ADMIN — INSULIN LISPRO 1 UNITS: 100 INJECTION, SOLUTION INTRAVENOUS; SUBCUTANEOUS at 08:53

## 2019-12-05 ASSESSMENT — PAIN SCALES - GENERAL
PAINLEVEL_OUTOF10: 0

## 2019-12-06 LAB
ANION GAP SERPL CALCULATED.3IONS-SCNC: 16 MMOL/L (ref 3–16)
BUN BLDV-MCNC: 17 MG/DL (ref 7–20)
CALCIUM SERPL-MCNC: 9.9 MG/DL (ref 8.3–10.6)
CHLORIDE BLD-SCNC: 94 MMOL/L (ref 99–110)
CO2: 23 MMOL/L (ref 21–32)
CREAT SERPL-MCNC: 0.8 MG/DL (ref 0.6–1.2)
GFR AFRICAN AMERICAN: >60
GFR NON-AFRICAN AMERICAN: >60
GLUCOSE BLD-MCNC: 104 MG/DL (ref 70–99)
GLUCOSE BLD-MCNC: 170 MG/DL (ref 70–99)
GLUCOSE BLD-MCNC: 174 MG/DL (ref 70–99)
GLUCOSE BLD-MCNC: 182 MG/DL (ref 70–99)
GLUCOSE BLD-MCNC: 92 MG/DL (ref 70–99)
HCT VFR BLD CALC: 42.5 % (ref 36–48)
HEMOGLOBIN: 14.2 G/DL (ref 12–16)
MAGNESIUM: 2 MG/DL (ref 1.8–2.4)
MCH RBC QN AUTO: 31.1 PG (ref 26–34)
MCHC RBC AUTO-ENTMCNC: 33.4 G/DL (ref 31–36)
MCV RBC AUTO: 93 FL (ref 80–100)
PDW BLD-RTO: 13.1 % (ref 12.4–15.4)
PERFORMED ON: ABNORMAL
PERFORMED ON: NORMAL
PLATELET # BLD: 340 K/UL (ref 135–450)
PMV BLD AUTO: 8 FL (ref 5–10.5)
POTASSIUM REFLEX MAGNESIUM: 3.8 MMOL/L (ref 3.5–5.1)
RBC # BLD: 4.57 M/UL (ref 4–5.2)
SODIUM BLD-SCNC: 133 MMOL/L (ref 136–145)
WBC # BLD: 7.4 K/UL (ref 4–11)

## 2019-12-06 PROCEDURE — 83735 ASSAY OF MAGNESIUM: CPT

## 2019-12-06 PROCEDURE — 97116 GAIT TRAINING THERAPY: CPT

## 2019-12-06 PROCEDURE — 97110 THERAPEUTIC EXERCISES: CPT

## 2019-12-06 PROCEDURE — 6370000000 HC RX 637 (ALT 250 FOR IP): Performed by: PHYSICAL MEDICINE & REHABILITATION

## 2019-12-06 PROCEDURE — 97535 SELF CARE MNGMENT TRAINING: CPT

## 2019-12-06 PROCEDURE — 97530 THERAPEUTIC ACTIVITIES: CPT

## 2019-12-06 PROCEDURE — 94760 N-INVAS EAR/PLS OXIMETRY 1: CPT

## 2019-12-06 PROCEDURE — 6360000002 HC RX W HCPCS: Performed by: PHYSICAL MEDICINE & REHABILITATION

## 2019-12-06 PROCEDURE — 85027 COMPLETE CBC AUTOMATED: CPT

## 2019-12-06 PROCEDURE — 92610 EVALUATE SWALLOWING FUNCTION: CPT

## 2019-12-06 PROCEDURE — 80048 BASIC METABOLIC PNL TOTAL CA: CPT

## 2019-12-06 PROCEDURE — 1280000000 HC REHAB R&B

## 2019-12-06 PROCEDURE — 36415 COLL VENOUS BLD VENIPUNCTURE: CPT

## 2019-12-06 PROCEDURE — 2580000003 HC RX 258: Performed by: PHYSICAL MEDICINE & REHABILITATION

## 2019-12-06 PROCEDURE — 97127 HC SP THER IVNTJ W/FOCUS COG FUNCJ: CPT

## 2019-12-06 RX ORDER — AMLODIPINE BESYLATE 10 MG/1
10 TABLET ORAL DAILY
Status: DISCONTINUED | OUTPATIENT
Start: 2019-12-07 | End: 2019-12-13 | Stop reason: HOSPADM

## 2019-12-06 RX ORDER — HYDRALAZINE HYDROCHLORIDE 25 MG/1
25 TABLET, FILM COATED ORAL EVERY 8 HOURS PRN
Status: DISCONTINUED | OUTPATIENT
Start: 2019-12-06 | End: 2019-12-13 | Stop reason: HOSPADM

## 2019-12-06 RX ADMIN — PANTOPRAZOLE SODIUM 40 MG: 40 TABLET, DELAYED RELEASE ORAL at 06:47

## 2019-12-06 RX ADMIN — INSULIN LISPRO 1 UNITS: 100 INJECTION, SOLUTION INTRAVENOUS; SUBCUTANEOUS at 21:18

## 2019-12-06 RX ADMIN — QUETIAPINE FUMARATE 50 MG: 25 TABLET ORAL at 21:26

## 2019-12-06 RX ADMIN — ENOXAPARIN SODIUM 40 MG: 40 INJECTION SUBCUTANEOUS at 08:25

## 2019-12-06 RX ADMIN — INSULIN LISPRO 6 UNITS: 100 INJECTION, SOLUTION INTRAVENOUS; SUBCUTANEOUS at 08:27

## 2019-12-06 RX ADMIN — FENOFIBRATE 160 MG: 160 TABLET ORAL at 08:25

## 2019-12-06 RX ADMIN — SENNOSIDES AND DOCUSATE SODIUM 2 TABLET: 8.6; 5 TABLET ORAL at 08:26

## 2019-12-06 RX ADMIN — SODIUM CHLORIDE, PRESERVATIVE FREE 10 ML: 5 INJECTION INTRAVENOUS at 08:26

## 2019-12-06 RX ADMIN — PANTOPRAZOLE SODIUM 40 MG: 40 TABLET, DELAYED RELEASE ORAL at 16:54

## 2019-12-06 RX ADMIN — INSULIN LISPRO 6 UNITS: 100 INJECTION, SOLUTION INTRAVENOUS; SUBCUTANEOUS at 12:11

## 2019-12-06 RX ADMIN — INSULIN LISPRO 1 UNITS: 100 INJECTION, SOLUTION INTRAVENOUS; SUBCUTANEOUS at 08:26

## 2019-12-06 RX ADMIN — SENNOSIDES AND DOCUSATE SODIUM 2 TABLET: 8.6; 5 TABLET ORAL at 21:18

## 2019-12-06 RX ADMIN — ASPIRIN 325 MG: 325 TABLET, DELAYED RELEASE ORAL at 08:25

## 2019-12-06 RX ADMIN — INSULIN GLARGINE 10 UNITS: 100 INJECTION, SOLUTION SUBCUTANEOUS at 21:18

## 2019-12-06 RX ADMIN — ATENOLOL 50 MG: 50 TABLET ORAL at 08:25

## 2019-12-06 RX ADMIN — ATORVASTATIN CALCIUM 80 MG: 80 TABLET, FILM COATED ORAL at 21:18

## 2019-12-06 RX ADMIN — AMLODIPINE BESYLATE 5 MG: 5 TABLET ORAL at 08:25

## 2019-12-06 RX ADMIN — LISINOPRIL 20 MG: 20 TABLET ORAL at 08:25

## 2019-12-06 RX ADMIN — DULOXETINE HYDROCHLORIDE 60 MG: 60 CAPSULE, DELAYED RELEASE ORAL at 08:26

## 2019-12-06 ASSESSMENT — 9 HOLE PEG TEST
TEST_RESULT: FUNCTIONAL
TESTTIME_SECONDS: 75
TESTTIME_SECONDS: 22.3
TEST_RESULT: IMPAIRED

## 2019-12-06 ASSESSMENT — PAIN SCALES - GENERAL
PAINLEVEL_OUTOF10: 0
PAINLEVEL_OUTOF10: 0

## 2019-12-07 LAB
GLUCOSE BLD-MCNC: 107 MG/DL (ref 70–99)
GLUCOSE BLD-MCNC: 113 MG/DL (ref 70–99)
GLUCOSE BLD-MCNC: 122 MG/DL (ref 70–99)
GLUCOSE BLD-MCNC: 162 MG/DL (ref 70–99)
PERFORMED ON: ABNORMAL

## 2019-12-07 PROCEDURE — 94760 N-INVAS EAR/PLS OXIMETRY 1: CPT

## 2019-12-07 PROCEDURE — 6370000000 HC RX 637 (ALT 250 FOR IP): Performed by: PHYSICAL MEDICINE & REHABILITATION

## 2019-12-07 PROCEDURE — 6360000002 HC RX W HCPCS: Performed by: PHYSICAL MEDICINE & REHABILITATION

## 2019-12-07 PROCEDURE — 1280000000 HC REHAB R&B

## 2019-12-07 RX ADMIN — INSULIN LISPRO 1 UNITS: 100 INJECTION, SOLUTION INTRAVENOUS; SUBCUTANEOUS at 09:40

## 2019-12-07 RX ADMIN — PANTOPRAZOLE SODIUM 40 MG: 40 TABLET, DELAYED RELEASE ORAL at 06:23

## 2019-12-07 RX ADMIN — AMLODIPINE BESYLATE 10 MG: 10 TABLET ORAL at 07:38

## 2019-12-07 RX ADMIN — ATORVASTATIN CALCIUM 80 MG: 80 TABLET, FILM COATED ORAL at 20:29

## 2019-12-07 RX ADMIN — ALPRAZOLAM 1 MG: 0.5 TABLET ORAL at 09:39

## 2019-12-07 RX ADMIN — ACETAMINOPHEN 650 MG: 325 TABLET ORAL at 07:38

## 2019-12-07 RX ADMIN — QUETIAPINE FUMARATE 50 MG: 25 TABLET ORAL at 20:29

## 2019-12-07 RX ADMIN — INSULIN GLARGINE 10 UNITS: 100 INJECTION, SOLUTION SUBCUTANEOUS at 20:29

## 2019-12-07 RX ADMIN — LISINOPRIL 20 MG: 20 TABLET ORAL at 07:38

## 2019-12-07 RX ADMIN — DULOXETINE HYDROCHLORIDE 60 MG: 60 CAPSULE, DELAYED RELEASE ORAL at 07:38

## 2019-12-07 RX ADMIN — INSULIN LISPRO 6 UNITS: 100 INJECTION, SOLUTION INTRAVENOUS; SUBCUTANEOUS at 13:08

## 2019-12-07 RX ADMIN — FENOFIBRATE 160 MG: 160 TABLET ORAL at 07:38

## 2019-12-07 RX ADMIN — ENOXAPARIN SODIUM 40 MG: 40 INJECTION SUBCUTANEOUS at 07:38

## 2019-12-07 RX ADMIN — ATENOLOL 50 MG: 50 TABLET ORAL at 07:38

## 2019-12-07 RX ADMIN — INSULIN LISPRO 6 UNITS: 100 INJECTION, SOLUTION INTRAVENOUS; SUBCUTANEOUS at 17:15

## 2019-12-07 RX ADMIN — ASPIRIN 325 MG: 325 TABLET, DELAYED RELEASE ORAL at 07:38

## 2019-12-07 RX ADMIN — PANTOPRAZOLE SODIUM 40 MG: 40 TABLET, DELAYED RELEASE ORAL at 17:20

## 2019-12-07 ASSESSMENT — PAIN DESCRIPTION - PAIN TYPE
TYPE: ACUTE PAIN
TYPE: ACUTE PAIN

## 2019-12-07 ASSESSMENT — PAIN DESCRIPTION - DESCRIPTORS
DESCRIPTORS: ACHING
DESCRIPTORS: THROBBING

## 2019-12-07 ASSESSMENT — PAIN DESCRIPTION - LOCATION
LOCATION: HEAD
LOCATION: HEAD

## 2019-12-07 ASSESSMENT — PAIN DESCRIPTION - ONSET
ONSET: PROGRESSIVE
ONSET: PROGRESSIVE

## 2019-12-07 ASSESSMENT — PAIN DESCRIPTION - PROGRESSION: CLINICAL_PROGRESSION: GRADUALLY IMPROVING

## 2019-12-07 ASSESSMENT — PAIN SCALES - GENERAL
PAINLEVEL_OUTOF10: 0
PAINLEVEL_OUTOF10: 10
PAINLEVEL_OUTOF10: 1
PAINLEVEL_OUTOF10: 0
PAINLEVEL_OUTOF10: 4

## 2019-12-07 ASSESSMENT — PAIN DESCRIPTION - FREQUENCY
FREQUENCY: INTERMITTENT
FREQUENCY: INTERMITTENT

## 2019-12-07 ASSESSMENT — PAIN - FUNCTIONAL ASSESSMENT
PAIN_FUNCTIONAL_ASSESSMENT: PREVENTS OR INTERFERES SOME ACTIVE ACTIVITIES AND ADLS
PAIN_FUNCTIONAL_ASSESSMENT: PREVENTS OR INTERFERES SOME ACTIVE ACTIVITIES AND ADLS

## 2019-12-07 ASSESSMENT — PAIN DESCRIPTION - ORIENTATION: ORIENTATION: MID

## 2019-12-08 LAB
GLUCOSE BLD-MCNC: 110 MG/DL (ref 70–99)
GLUCOSE BLD-MCNC: 130 MG/DL (ref 70–99)
GLUCOSE BLD-MCNC: 136 MG/DL (ref 70–99)
GLUCOSE BLD-MCNC: 144 MG/DL (ref 70–99)
PERFORMED ON: ABNORMAL

## 2019-12-08 PROCEDURE — 6370000000 HC RX 637 (ALT 250 FOR IP): Performed by: PHYSICAL MEDICINE & REHABILITATION

## 2019-12-08 PROCEDURE — 94760 N-INVAS EAR/PLS OXIMETRY 1: CPT

## 2019-12-08 PROCEDURE — 6360000002 HC RX W HCPCS: Performed by: PHYSICAL MEDICINE & REHABILITATION

## 2019-12-08 PROCEDURE — 1280000000 HC REHAB R&B

## 2019-12-08 RX ADMIN — ALPRAZOLAM 1 MG: 0.5 TABLET ORAL at 13:51

## 2019-12-08 RX ADMIN — DULOXETINE HYDROCHLORIDE 60 MG: 60 CAPSULE, DELAYED RELEASE ORAL at 08:06

## 2019-12-08 RX ADMIN — ENOXAPARIN SODIUM 40 MG: 40 INJECTION SUBCUTANEOUS at 08:00

## 2019-12-08 RX ADMIN — FENOFIBRATE 160 MG: 160 TABLET ORAL at 08:05

## 2019-12-08 RX ADMIN — PANTOPRAZOLE SODIUM 40 MG: 40 TABLET, DELAYED RELEASE ORAL at 05:47

## 2019-12-08 RX ADMIN — ATORVASTATIN CALCIUM 80 MG: 80 TABLET, FILM COATED ORAL at 20:17

## 2019-12-08 RX ADMIN — ASPIRIN 325 MG: 325 TABLET, DELAYED RELEASE ORAL at 08:06

## 2019-12-08 RX ADMIN — LISINOPRIL 20 MG: 20 TABLET ORAL at 08:06

## 2019-12-08 RX ADMIN — OXYCODONE HYDROCHLORIDE 2.5 MG: 5 TABLET ORAL at 14:43

## 2019-12-08 RX ADMIN — ACETAMINOPHEN 650 MG: 325 TABLET ORAL at 20:17

## 2019-12-08 RX ADMIN — INSULIN LISPRO 6 UNITS: 100 INJECTION, SOLUTION INTRAVENOUS; SUBCUTANEOUS at 11:54

## 2019-12-08 RX ADMIN — AMLODIPINE BESYLATE 10 MG: 10 TABLET ORAL at 08:05

## 2019-12-08 RX ADMIN — PANTOPRAZOLE SODIUM 40 MG: 40 TABLET, DELAYED RELEASE ORAL at 15:34

## 2019-12-08 RX ADMIN — ATENOLOL 50 MG: 50 TABLET ORAL at 08:06

## 2019-12-08 RX ADMIN — INSULIN LISPRO 1 UNITS: 100 INJECTION, SOLUTION INTRAVENOUS; SUBCUTANEOUS at 07:56

## 2019-12-08 RX ADMIN — QUETIAPINE FUMARATE 50 MG: 25 TABLET ORAL at 20:17

## 2019-12-08 RX ADMIN — INSULIN GLARGINE 10 UNITS: 100 INJECTION, SOLUTION SUBCUTANEOUS at 20:54

## 2019-12-08 RX ADMIN — INSULIN LISPRO 6 UNITS: 100 INJECTION, SOLUTION INTRAVENOUS; SUBCUTANEOUS at 07:57

## 2019-12-08 RX ADMIN — INSULIN LISPRO 6 UNITS: 100 INJECTION, SOLUTION INTRAVENOUS; SUBCUTANEOUS at 17:02

## 2019-12-08 ASSESSMENT — PAIN SCALES - GENERAL
PAINLEVEL_OUTOF10: 8
PAINLEVEL_OUTOF10: 3
PAINLEVEL_OUTOF10: 5
PAINLEVEL_OUTOF10: 8
PAINLEVEL_OUTOF10: 0
PAINLEVEL_OUTOF10: 6
PAINLEVEL_OUTOF10: 0
PAINLEVEL_OUTOF10: 0

## 2019-12-08 ASSESSMENT — PAIN DESCRIPTION - PAIN TYPE
TYPE: CHRONIC PAIN
TYPE: CHRONIC PAIN

## 2019-12-08 ASSESSMENT — PAIN DESCRIPTION - LOCATION
LOCATION: HEAD
LOCATION: HEAD

## 2019-12-08 ASSESSMENT — PAIN DESCRIPTION - FREQUENCY: FREQUENCY: INTERMITTENT

## 2019-12-08 ASSESSMENT — PAIN DESCRIPTION - DESCRIPTORS
DESCRIPTORS: ACHING
DESCRIPTORS: HEADACHE

## 2019-12-09 LAB
ANION GAP SERPL CALCULATED.3IONS-SCNC: 14 MMOL/L (ref 3–16)
BUN BLDV-MCNC: 13 MG/DL (ref 7–20)
CALCIUM SERPL-MCNC: 9.5 MG/DL (ref 8.3–10.6)
CHLORIDE BLD-SCNC: 102 MMOL/L (ref 99–110)
CO2: 22 MMOL/L (ref 21–32)
CREAT SERPL-MCNC: 0.7 MG/DL (ref 0.6–1.2)
GFR AFRICAN AMERICAN: >60
GFR NON-AFRICAN AMERICAN: >60
GLUCOSE BLD-MCNC: 110 MG/DL (ref 70–99)
GLUCOSE BLD-MCNC: 146 MG/DL (ref 70–99)
GLUCOSE BLD-MCNC: 158 MG/DL (ref 70–99)
GLUCOSE BLD-MCNC: 161 MG/DL (ref 70–99)
GLUCOSE BLD-MCNC: 200 MG/DL (ref 70–99)
HCT VFR BLD CALC: 41.4 % (ref 36–48)
HEMOGLOBIN: 13.8 G/DL (ref 12–16)
MCH RBC QN AUTO: 31.3 PG (ref 26–34)
MCHC RBC AUTO-ENTMCNC: 33.3 G/DL (ref 31–36)
MCV RBC AUTO: 94 FL (ref 80–100)
PDW BLD-RTO: 13.5 % (ref 12.4–15.4)
PERFORMED ON: ABNORMAL
PLATELET # BLD: 287 K/UL (ref 135–450)
PMV BLD AUTO: 8 FL (ref 5–10.5)
POTASSIUM SERPL-SCNC: 3.8 MMOL/L (ref 3.5–5.1)
RBC # BLD: 4.4 M/UL (ref 4–5.2)
SODIUM BLD-SCNC: 138 MMOL/L (ref 136–145)
WBC # BLD: 8.7 K/UL (ref 4–11)

## 2019-12-09 PROCEDURE — 97110 THERAPEUTIC EXERCISES: CPT

## 2019-12-09 PROCEDURE — 85027 COMPLETE CBC AUTOMATED: CPT

## 2019-12-09 PROCEDURE — 97127 HC SP THER IVNTJ W/FOCUS COG FUNCJ: CPT

## 2019-12-09 PROCEDURE — 36415 COLL VENOUS BLD VENIPUNCTURE: CPT

## 2019-12-09 PROCEDURE — 6360000002 HC RX W HCPCS: Performed by: PHYSICAL MEDICINE & REHABILITATION

## 2019-12-09 PROCEDURE — 97530 THERAPEUTIC ACTIVITIES: CPT

## 2019-12-09 PROCEDURE — 97535 SELF CARE MNGMENT TRAINING: CPT

## 2019-12-09 PROCEDURE — 80048 BASIC METABOLIC PNL TOTAL CA: CPT

## 2019-12-09 PROCEDURE — 1280000000 HC REHAB R&B

## 2019-12-09 PROCEDURE — 92507 TX SP LANG VOICE COMM INDIV: CPT

## 2019-12-09 PROCEDURE — 97116 GAIT TRAINING THERAPY: CPT

## 2019-12-09 PROCEDURE — 6370000000 HC RX 637 (ALT 250 FOR IP): Performed by: PHYSICAL MEDICINE & REHABILITATION

## 2019-12-09 RX ORDER — SUMATRIPTAN 50 MG/1
50 TABLET, FILM COATED ORAL
Status: DISPENSED | OUTPATIENT
Start: 2019-12-09 | End: 2019-12-09

## 2019-12-09 RX ADMIN — DULOXETINE HYDROCHLORIDE 60 MG: 60 CAPSULE, DELAYED RELEASE ORAL at 07:31

## 2019-12-09 RX ADMIN — FENOFIBRATE 160 MG: 160 TABLET ORAL at 07:31

## 2019-12-09 RX ADMIN — INSULIN LISPRO 6 UNITS: 100 INJECTION, SOLUTION INTRAVENOUS; SUBCUTANEOUS at 08:13

## 2019-12-09 RX ADMIN — INSULIN LISPRO 2 UNITS: 100 INJECTION, SOLUTION INTRAVENOUS; SUBCUTANEOUS at 16:39

## 2019-12-09 RX ADMIN — INSULIN GLARGINE 10 UNITS: 100 INJECTION, SOLUTION SUBCUTANEOUS at 21:33

## 2019-12-09 RX ADMIN — INSULIN LISPRO 6 UNITS: 100 INJECTION, SOLUTION INTRAVENOUS; SUBCUTANEOUS at 12:18

## 2019-12-09 RX ADMIN — PANTOPRAZOLE SODIUM 40 MG: 40 TABLET, DELAYED RELEASE ORAL at 16:15

## 2019-12-09 RX ADMIN — QUETIAPINE FUMARATE 50 MG: 25 TABLET ORAL at 21:33

## 2019-12-09 RX ADMIN — ENOXAPARIN SODIUM 40 MG: 40 INJECTION SUBCUTANEOUS at 07:31

## 2019-12-09 RX ADMIN — LISINOPRIL 20 MG: 20 TABLET ORAL at 07:30

## 2019-12-09 RX ADMIN — AMLODIPINE BESYLATE 10 MG: 10 TABLET ORAL at 07:30

## 2019-12-09 RX ADMIN — INSULIN LISPRO 1 UNITS: 100 INJECTION, SOLUTION INTRAVENOUS; SUBCUTANEOUS at 21:33

## 2019-12-09 RX ADMIN — INSULIN LISPRO 6 UNITS: 100 INJECTION, SOLUTION INTRAVENOUS; SUBCUTANEOUS at 16:39

## 2019-12-09 RX ADMIN — ASPIRIN 325 MG: 325 TABLET, DELAYED RELEASE ORAL at 07:31

## 2019-12-09 RX ADMIN — INSULIN LISPRO 1 UNITS: 100 INJECTION, SOLUTION INTRAVENOUS; SUBCUTANEOUS at 08:13

## 2019-12-09 RX ADMIN — ATENOLOL 50 MG: 50 TABLET ORAL at 07:31

## 2019-12-09 RX ADMIN — ATORVASTATIN CALCIUM 80 MG: 80 TABLET, FILM COATED ORAL at 21:33

## 2019-12-09 RX ADMIN — PANTOPRAZOLE SODIUM 40 MG: 40 TABLET, DELAYED RELEASE ORAL at 05:25

## 2019-12-09 ASSESSMENT — PAIN SCALES - GENERAL
PAINLEVEL_OUTOF10: 0
PAINLEVEL_OUTOF10: 0

## 2019-12-10 LAB
GLUCOSE BLD-MCNC: 114 MG/DL (ref 70–99)
GLUCOSE BLD-MCNC: 128 MG/DL (ref 70–99)
GLUCOSE BLD-MCNC: 165 MG/DL (ref 70–99)
GLUCOSE BLD-MCNC: 98 MG/DL (ref 70–99)
PERFORMED ON: ABNORMAL
PERFORMED ON: NORMAL

## 2019-12-10 PROCEDURE — 97116 GAIT TRAINING THERAPY: CPT

## 2019-12-10 PROCEDURE — 6360000002 HC RX W HCPCS: Performed by: PHYSICAL MEDICINE & REHABILITATION

## 2019-12-10 PROCEDURE — 6370000000 HC RX 637 (ALT 250 FOR IP): Performed by: PHYSICAL MEDICINE & REHABILITATION

## 2019-12-10 PROCEDURE — 97530 THERAPEUTIC ACTIVITIES: CPT

## 2019-12-10 PROCEDURE — 97110 THERAPEUTIC EXERCISES: CPT

## 2019-12-10 PROCEDURE — 1280000000 HC REHAB R&B

## 2019-12-10 PROCEDURE — 97127 HC SP THER IVNTJ W/FOCUS COG FUNCJ: CPT

## 2019-12-10 RX ADMIN — AMLODIPINE BESYLATE 10 MG: 10 TABLET ORAL at 08:18

## 2019-12-10 RX ADMIN — DULOXETINE HYDROCHLORIDE 60 MG: 60 CAPSULE, DELAYED RELEASE ORAL at 08:18

## 2019-12-10 RX ADMIN — ENOXAPARIN SODIUM 40 MG: 40 INJECTION SUBCUTANEOUS at 08:19

## 2019-12-10 RX ADMIN — INSULIN LISPRO 6 UNITS: 100 INJECTION, SOLUTION INTRAVENOUS; SUBCUTANEOUS at 08:16

## 2019-12-10 RX ADMIN — INSULIN LISPRO 6 UNITS: 100 INJECTION, SOLUTION INTRAVENOUS; SUBCUTANEOUS at 17:04

## 2019-12-10 RX ADMIN — INSULIN LISPRO 6 UNITS: 100 INJECTION, SOLUTION INTRAVENOUS; SUBCUTANEOUS at 13:30

## 2019-12-10 RX ADMIN — INSULIN LISPRO 1 UNITS: 100 INJECTION, SOLUTION INTRAVENOUS; SUBCUTANEOUS at 08:18

## 2019-12-10 RX ADMIN — QUETIAPINE FUMARATE 50 MG: 25 TABLET ORAL at 20:46

## 2019-12-10 RX ADMIN — PANTOPRAZOLE SODIUM 40 MG: 40 TABLET, DELAYED RELEASE ORAL at 17:05

## 2019-12-10 RX ADMIN — ATENOLOL 50 MG: 50 TABLET ORAL at 08:18

## 2019-12-10 RX ADMIN — ATORVASTATIN CALCIUM 80 MG: 80 TABLET, FILM COATED ORAL at 20:46

## 2019-12-10 RX ADMIN — SENNOSIDES AND DOCUSATE SODIUM 2 TABLET: 8.6; 5 TABLET ORAL at 08:18

## 2019-12-10 RX ADMIN — INSULIN GLARGINE 10 UNITS: 100 INJECTION, SOLUTION SUBCUTANEOUS at 20:46

## 2019-12-10 RX ADMIN — FENOFIBRATE 160 MG: 160 TABLET ORAL at 08:18

## 2019-12-10 RX ADMIN — ASPIRIN 325 MG: 325 TABLET, DELAYED RELEASE ORAL at 08:17

## 2019-12-10 RX ADMIN — PANTOPRAZOLE SODIUM 40 MG: 40 TABLET, DELAYED RELEASE ORAL at 05:10

## 2019-12-10 RX ADMIN — LISINOPRIL 20 MG: 20 TABLET ORAL at 08:18

## 2019-12-10 ASSESSMENT — PAIN SCALES - GENERAL
PAINLEVEL_OUTOF10: 0

## 2019-12-11 LAB
GLUCOSE BLD-MCNC: 124 MG/DL (ref 70–99)
GLUCOSE BLD-MCNC: 135 MG/DL (ref 70–99)
GLUCOSE BLD-MCNC: 146 MG/DL (ref 70–99)
GLUCOSE BLD-MCNC: 186 MG/DL (ref 70–99)
GLUCOSE BLD-MCNC: 94 MG/DL (ref 70–99)
PERFORMED ON: ABNORMAL
PERFORMED ON: NORMAL

## 2019-12-11 PROCEDURE — 97127 HC SP THER IVNTJ W/FOCUS COG FUNCJ: CPT

## 2019-12-11 PROCEDURE — 97530 THERAPEUTIC ACTIVITIES: CPT

## 2019-12-11 PROCEDURE — 97110 THERAPEUTIC EXERCISES: CPT

## 2019-12-11 PROCEDURE — 6370000000 HC RX 637 (ALT 250 FOR IP): Performed by: PHYSICAL MEDICINE & REHABILITATION

## 2019-12-11 PROCEDURE — 92507 TX SP LANG VOICE COMM INDIV: CPT

## 2019-12-11 PROCEDURE — 97535 SELF CARE MNGMENT TRAINING: CPT

## 2019-12-11 PROCEDURE — 97116 GAIT TRAINING THERAPY: CPT

## 2019-12-11 PROCEDURE — 6360000002 HC RX W HCPCS: Performed by: PHYSICAL MEDICINE & REHABILITATION

## 2019-12-11 PROCEDURE — 1280000000 HC REHAB R&B

## 2019-12-11 RX ADMIN — DULOXETINE HYDROCHLORIDE 60 MG: 60 CAPSULE, DELAYED RELEASE ORAL at 08:01

## 2019-12-11 RX ADMIN — INSULIN LISPRO 6 UNITS: 100 INJECTION, SOLUTION INTRAVENOUS; SUBCUTANEOUS at 12:20

## 2019-12-11 RX ADMIN — INSULIN LISPRO 1 UNITS: 100 INJECTION, SOLUTION INTRAVENOUS; SUBCUTANEOUS at 08:02

## 2019-12-11 RX ADMIN — PANTOPRAZOLE SODIUM 40 MG: 40 TABLET, DELAYED RELEASE ORAL at 16:03

## 2019-12-11 RX ADMIN — ATORVASTATIN CALCIUM 80 MG: 80 TABLET, FILM COATED ORAL at 20:24

## 2019-12-11 RX ADMIN — FENOFIBRATE 160 MG: 160 TABLET ORAL at 08:01

## 2019-12-11 RX ADMIN — ENOXAPARIN SODIUM 40 MG: 40 INJECTION SUBCUTANEOUS at 08:01

## 2019-12-11 RX ADMIN — ATENOLOL 50 MG: 50 TABLET ORAL at 08:01

## 2019-12-11 RX ADMIN — INSULIN LISPRO 6 UNITS: 100 INJECTION, SOLUTION INTRAVENOUS; SUBCUTANEOUS at 16:57

## 2019-12-11 RX ADMIN — ASPIRIN 325 MG: 325 TABLET, DELAYED RELEASE ORAL at 08:01

## 2019-12-11 RX ADMIN — INSULIN GLARGINE 10 UNITS: 100 INJECTION, SOLUTION SUBCUTANEOUS at 20:27

## 2019-12-11 RX ADMIN — PANTOPRAZOLE SODIUM 40 MG: 40 TABLET, DELAYED RELEASE ORAL at 05:12

## 2019-12-11 RX ADMIN — INSULIN LISPRO 1 UNITS: 100 INJECTION, SOLUTION INTRAVENOUS; SUBCUTANEOUS at 20:27

## 2019-12-11 RX ADMIN — INSULIN LISPRO 6 UNITS: 100 INJECTION, SOLUTION INTRAVENOUS; SUBCUTANEOUS at 08:02

## 2019-12-11 RX ADMIN — AMLODIPINE BESYLATE 10 MG: 10 TABLET ORAL at 08:01

## 2019-12-11 RX ADMIN — QUETIAPINE FUMARATE 50 MG: 25 TABLET ORAL at 20:26

## 2019-12-11 RX ADMIN — LISINOPRIL 20 MG: 20 TABLET ORAL at 08:01

## 2019-12-11 ASSESSMENT — PAIN SCALES - GENERAL
PAINLEVEL_OUTOF10: 0

## 2019-12-12 LAB
GLUCOSE BLD-MCNC: 131 MG/DL (ref 70–99)
GLUCOSE BLD-MCNC: 135 MG/DL (ref 70–99)
GLUCOSE BLD-MCNC: 147 MG/DL (ref 70–99)
GLUCOSE BLD-MCNC: 206 MG/DL (ref 70–99)
PERFORMED ON: ABNORMAL

## 2019-12-12 PROCEDURE — 92507 TX SP LANG VOICE COMM INDIV: CPT

## 2019-12-12 PROCEDURE — 97110 THERAPEUTIC EXERCISES: CPT

## 2019-12-12 PROCEDURE — 97530 THERAPEUTIC ACTIVITIES: CPT

## 2019-12-12 PROCEDURE — 94760 N-INVAS EAR/PLS OXIMETRY 1: CPT

## 2019-12-12 PROCEDURE — 97535 SELF CARE MNGMENT TRAINING: CPT

## 2019-12-12 PROCEDURE — 1280000000 HC REHAB R&B

## 2019-12-12 PROCEDURE — 97116 GAIT TRAINING THERAPY: CPT

## 2019-12-12 PROCEDURE — 6370000000 HC RX 637 (ALT 250 FOR IP): Performed by: PHYSICAL MEDICINE & REHABILITATION

## 2019-12-12 PROCEDURE — 6360000002 HC RX W HCPCS: Performed by: PHYSICAL MEDICINE & REHABILITATION

## 2019-12-12 PROCEDURE — 97127 HC SP THER IVNTJ W/FOCUS COG FUNCJ: CPT

## 2019-12-12 RX ORDER — AMLODIPINE BESYLATE 10 MG/1
10 TABLET ORAL DAILY
Qty: 30 TABLET | Refills: 3 | Status: ON HOLD | OUTPATIENT
Start: 2019-12-13 | End: 2020-03-19 | Stop reason: HOSPADM

## 2019-12-12 RX ADMIN — ATORVASTATIN CALCIUM 80 MG: 80 TABLET, FILM COATED ORAL at 20:49

## 2019-12-12 RX ADMIN — ASPIRIN 325 MG: 325 TABLET, DELAYED RELEASE ORAL at 07:43

## 2019-12-12 RX ADMIN — INSULIN LISPRO 1 UNITS: 100 INJECTION, SOLUTION INTRAVENOUS; SUBCUTANEOUS at 20:50

## 2019-12-12 RX ADMIN — PANTOPRAZOLE SODIUM 40 MG: 40 TABLET, DELAYED RELEASE ORAL at 05:14

## 2019-12-12 RX ADMIN — INSULIN LISPRO 1 UNITS: 100 INJECTION, SOLUTION INTRAVENOUS; SUBCUTANEOUS at 07:43

## 2019-12-12 RX ADMIN — AMLODIPINE BESYLATE 10 MG: 10 TABLET ORAL at 07:42

## 2019-12-12 RX ADMIN — FENOFIBRATE 160 MG: 160 TABLET ORAL at 07:43

## 2019-12-12 RX ADMIN — QUETIAPINE FUMARATE 50 MG: 25 TABLET ORAL at 20:49

## 2019-12-12 RX ADMIN — INSULIN LISPRO 6 UNITS: 100 INJECTION, SOLUTION INTRAVENOUS; SUBCUTANEOUS at 16:39

## 2019-12-12 RX ADMIN — PANTOPRAZOLE SODIUM 40 MG: 40 TABLET, DELAYED RELEASE ORAL at 15:23

## 2019-12-12 RX ADMIN — LISINOPRIL 20 MG: 20 TABLET ORAL at 07:42

## 2019-12-12 RX ADMIN — INSULIN GLARGINE 10 UNITS: 100 INJECTION, SOLUTION SUBCUTANEOUS at 20:50

## 2019-12-12 RX ADMIN — INSULIN LISPRO 6 UNITS: 100 INJECTION, SOLUTION INTRAVENOUS; SUBCUTANEOUS at 12:14

## 2019-12-12 RX ADMIN — DULOXETINE HYDROCHLORIDE 60 MG: 60 CAPSULE, DELAYED RELEASE ORAL at 07:42

## 2019-12-12 RX ADMIN — ENOXAPARIN SODIUM 40 MG: 40 INJECTION SUBCUTANEOUS at 07:43

## 2019-12-12 RX ADMIN — INSULIN LISPRO 6 UNITS: 100 INJECTION, SOLUTION INTRAVENOUS; SUBCUTANEOUS at 07:43

## 2019-12-12 RX ADMIN — ATENOLOL 50 MG: 50 TABLET ORAL at 07:43

## 2019-12-12 ASSESSMENT — 9 HOLE PEG TEST
TEST_RESULT: IMPAIRED
TESTTIME_SECONDS: 26.3
TESTTIME_SECONDS: 33.7

## 2019-12-12 ASSESSMENT — PAIN SCALES - GENERAL
PAINLEVEL_OUTOF10: 0
PAINLEVEL_OUTOF10: 0

## 2019-12-13 VITALS
BODY MASS INDEX: 27.71 KG/M2 | TEMPERATURE: 97.9 F | OXYGEN SATURATION: 97 % | SYSTOLIC BLOOD PRESSURE: 132 MMHG | HEIGHT: 66 IN | WEIGHT: 172.4 LBS | DIASTOLIC BLOOD PRESSURE: 81 MMHG | HEART RATE: 84 BPM | RESPIRATION RATE: 17 BRPM

## 2019-12-13 LAB
GLUCOSE BLD-MCNC: 159 MG/DL (ref 70–99)
PERFORMED ON: ABNORMAL

## 2019-12-13 PROCEDURE — 6360000002 HC RX W HCPCS: Performed by: PHYSICAL MEDICINE & REHABILITATION

## 2019-12-13 PROCEDURE — 6370000000 HC RX 637 (ALT 250 FOR IP): Performed by: PHYSICAL MEDICINE & REHABILITATION

## 2019-12-13 RX ORDER — SUMATRIPTAN 50 MG/1
50 TABLET, FILM COATED ORAL
Qty: 9 TABLET | Refills: 5 | Status: SHIPPED | OUTPATIENT
Start: 2019-12-13 | End: 2021-05-07

## 2019-12-13 RX ADMIN — INSULIN LISPRO 6 UNITS: 100 INJECTION, SOLUTION INTRAVENOUS; SUBCUTANEOUS at 08:20

## 2019-12-13 RX ADMIN — ASPIRIN 325 MG: 325 TABLET, DELAYED RELEASE ORAL at 08:20

## 2019-12-13 RX ADMIN — ENOXAPARIN SODIUM 40 MG: 40 INJECTION SUBCUTANEOUS at 08:23

## 2019-12-13 RX ADMIN — AMLODIPINE BESYLATE 10 MG: 10 TABLET ORAL at 08:20

## 2019-12-13 RX ADMIN — PANTOPRAZOLE SODIUM 40 MG: 40 TABLET, DELAYED RELEASE ORAL at 05:12

## 2019-12-13 RX ADMIN — LISINOPRIL 20 MG: 20 TABLET ORAL at 08:20

## 2019-12-13 RX ADMIN — SENNOSIDES AND DOCUSATE SODIUM 2 TABLET: 8.6; 5 TABLET ORAL at 08:20

## 2019-12-13 RX ADMIN — FENOFIBRATE 160 MG: 160 TABLET ORAL at 08:20

## 2019-12-13 RX ADMIN — DULOXETINE HYDROCHLORIDE 60 MG: 60 CAPSULE, DELAYED RELEASE ORAL at 08:20

## 2019-12-13 RX ADMIN — ATENOLOL 50 MG: 50 TABLET ORAL at 08:20

## 2019-12-13 RX ADMIN — INSULIN LISPRO 1 UNITS: 100 INJECTION, SOLUTION INTRAVENOUS; SUBCUTANEOUS at 08:21

## 2019-12-13 ASSESSMENT — PAIN SCALES - GENERAL: PAINLEVEL_OUTOF10: 0

## 2020-03-17 ENCOUNTER — APPOINTMENT (OUTPATIENT)
Dept: GENERAL RADIOLOGY | Age: 75
DRG: 482 | End: 2020-03-17
Payer: MEDICARE

## 2020-03-17 ENCOUNTER — HOSPITAL ENCOUNTER (INPATIENT)
Age: 75
LOS: 2 days | Discharge: NURSING, REHAB OR LONG-TERM CARE | DRG: 482 | End: 2020-03-20
Attending: EMERGENCY MEDICINE | Admitting: PEDIATRICS
Payer: MEDICARE

## 2020-03-17 PROCEDURE — 99285 EMERGENCY DEPT VISIT HI MDM: CPT

## 2020-03-17 PROCEDURE — 73502 X-RAY EXAM HIP UNI 2-3 VIEWS: CPT

## 2020-03-17 PROCEDURE — 51702 INSERT TEMP BLADDER CATH: CPT

## 2020-03-17 ASSESSMENT — PAIN DESCRIPTION - ORIENTATION: ORIENTATION: RIGHT

## 2020-03-17 ASSESSMENT — PAIN DESCRIPTION - PROGRESSION: CLINICAL_PROGRESSION: NOT CHANGED

## 2020-03-17 ASSESSMENT — PAIN DESCRIPTION - DESCRIPTORS: DESCRIPTORS: TIGHTNESS;SHARP

## 2020-03-17 ASSESSMENT — PAIN - FUNCTIONAL ASSESSMENT: PAIN_FUNCTIONAL_ASSESSMENT: PREVENTS OR INTERFERES SOME ACTIVE ACTIVITIES AND ADLS

## 2020-03-17 ASSESSMENT — PAIN DESCRIPTION - PAIN TYPE: TYPE: ACUTE PAIN

## 2020-03-17 ASSESSMENT — PAIN DESCRIPTION - LOCATION: LOCATION: LEG;HIP

## 2020-03-17 ASSESSMENT — PAIN DESCRIPTION - FREQUENCY: FREQUENCY: CONTINUOUS

## 2020-03-17 ASSESSMENT — PAIN SCALES - GENERAL: PAINLEVEL_OUTOF10: 10

## 2020-03-18 ENCOUNTER — ANESTHESIA EVENT (OUTPATIENT)
Dept: OPERATING ROOM | Age: 75
DRG: 482 | End: 2020-03-18
Payer: MEDICARE

## 2020-03-18 ENCOUNTER — APPOINTMENT (OUTPATIENT)
Dept: GENERAL RADIOLOGY | Age: 75
DRG: 482 | End: 2020-03-18
Payer: MEDICARE

## 2020-03-18 ENCOUNTER — ANESTHESIA (OUTPATIENT)
Dept: OPERATING ROOM | Age: 75
DRG: 482 | End: 2020-03-18
Payer: MEDICARE

## 2020-03-18 ENCOUNTER — APPOINTMENT (OUTPATIENT)
Dept: CT IMAGING | Age: 75
DRG: 482 | End: 2020-03-18
Payer: MEDICARE

## 2020-03-18 VITALS
DIASTOLIC BLOOD PRESSURE: 51 MMHG | RESPIRATION RATE: 6 BRPM | TEMPERATURE: 97.9 F | SYSTOLIC BLOOD PRESSURE: 93 MMHG | OXYGEN SATURATION: 84 %

## 2020-03-18 PROBLEM — T14.8XXA BONE FRACTURE: Status: ACTIVE | Noted: 2020-03-18

## 2020-03-18 LAB
A/G RATIO: 1.6 (ref 1.1–2.2)
ABO/RH: NORMAL
ALBUMIN SERPL-MCNC: 4.1 G/DL (ref 3.4–5)
ALP BLD-CCNC: 58 U/L (ref 40–129)
ALT SERPL-CCNC: 34 U/L (ref 10–40)
ANION GAP SERPL CALCULATED.3IONS-SCNC: 17 MMOL/L (ref 3–16)
ANTIBODY SCREEN: NORMAL
AST SERPL-CCNC: 25 U/L (ref 15–37)
BASOPHILS ABSOLUTE: 0.1 K/UL (ref 0–0.2)
BASOPHILS RELATIVE PERCENT: 0.5 %
BILIRUB SERPL-MCNC: <0.2 MG/DL (ref 0–1)
BILIRUBIN URINE: ABNORMAL
BLOOD, URINE: NEGATIVE
BUN BLDV-MCNC: 19 MG/DL (ref 7–20)
CALCIUM SERPL-MCNC: 9.7 MG/DL (ref 8.3–10.6)
CHLORIDE BLD-SCNC: 97 MMOL/L (ref 99–110)
CLARITY: CLEAR
CO2: 22 MMOL/L (ref 21–32)
COLOR: YELLOW
CREAT SERPL-MCNC: 1 MG/DL (ref 0.6–1.2)
EKG ATRIAL RATE: 62 BPM
EKG DIAGNOSIS: NORMAL
EKG P AXIS: 36 DEGREES
EKG P-R INTERVAL: 156 MS
EKG Q-T INTERVAL: 454 MS
EKG QRS DURATION: 82 MS
EKG QTC CALCULATION (BAZETT): 460 MS
EKG R AXIS: 4 DEGREES
EKG T AXIS: 52 DEGREES
EKG VENTRICULAR RATE: 62 BPM
EOSINOPHILS ABSOLUTE: 0.4 K/UL (ref 0–0.6)
EOSINOPHILS RELATIVE PERCENT: 2.5 %
GFR AFRICAN AMERICAN: >60
GFR NON-AFRICAN AMERICAN: 54
GLOBULIN: 2.5 G/DL
GLUCOSE BLD-MCNC: 133 MG/DL (ref 70–99)
GLUCOSE BLD-MCNC: 147 MG/DL (ref 70–99)
GLUCOSE BLD-MCNC: 160 MG/DL (ref 70–99)
GLUCOSE BLD-MCNC: 182 MG/DL (ref 70–99)
GLUCOSE BLD-MCNC: 194 MG/DL (ref 70–99)
GLUCOSE BLD-MCNC: 216 MG/DL (ref 70–99)
GLUCOSE URINE: NEGATIVE MG/DL
HCT VFR BLD CALC: 41.1 % (ref 36–48)
HEMOGLOBIN: 13.5 G/DL (ref 12–16)
INR BLD: 0.93 (ref 0.86–1.14)
KETONES, URINE: ABNORMAL MG/DL
LEUKOCYTE ESTERASE, URINE: NEGATIVE
LYMPHOCYTES ABSOLUTE: 2 K/UL (ref 1–5.1)
LYMPHOCYTES RELATIVE PERCENT: 13.2 %
MCH RBC QN AUTO: 31 PG (ref 26–34)
MCHC RBC AUTO-ENTMCNC: 32.9 G/DL (ref 31–36)
MCV RBC AUTO: 94.1 FL (ref 80–100)
MICROSCOPIC EXAMINATION: ABNORMAL
MONOCYTES ABSOLUTE: 0.8 K/UL (ref 0–1.3)
MONOCYTES RELATIVE PERCENT: 5.4 %
NEUTROPHILS ABSOLUTE: 11.7 K/UL (ref 1.7–7.7)
NEUTROPHILS RELATIVE PERCENT: 78.4 %
NITRITE, URINE: NEGATIVE
PDW BLD-RTO: 13.7 % (ref 12.4–15.4)
PERFORMED ON: ABNORMAL
PH UA: 5 (ref 5–8)
PLATELET # BLD: 248 K/UL (ref 135–450)
PMV BLD AUTO: 8.1 FL (ref 5–10.5)
POTASSIUM REFLEX MAGNESIUM: 4.3 MMOL/L (ref 3.5–5.1)
PROTEIN UA: NEGATIVE MG/DL
PROTHROMBIN TIME: 10.8 SEC (ref 10–13.2)
RBC # BLD: 4.37 M/UL (ref 4–5.2)
SODIUM BLD-SCNC: 136 MMOL/L (ref 136–145)
SPECIFIC GRAVITY UA: 1.02 (ref 1–1.03)
TOTAL PROTEIN: 6.6 G/DL (ref 6.4–8.2)
TROPONIN: <0.01 NG/ML
URINE REFLEX TO CULTURE: ABNORMAL
URINE TYPE: ABNORMAL
UROBILINOGEN, URINE: 0.2 E.U./DL
WBC # BLD: 14.9 K/UL (ref 4–11)

## 2020-03-18 PROCEDURE — 80053 COMPREHEN METABOLIC PANEL: CPT

## 2020-03-18 PROCEDURE — 0QS606Z REPOSITION RIGHT UPPER FEMUR WITH INTRAMEDULLARY INTERNAL FIXATION DEVICE, OPEN APPROACH: ICD-10-PCS | Performed by: HOSPITALIST

## 2020-03-18 PROCEDURE — 97166 OT EVAL MOD COMPLEX 45 MIN: CPT

## 2020-03-18 PROCEDURE — 94010 BREATHING CAPACITY TEST: CPT

## 2020-03-18 PROCEDURE — 97162 PT EVAL MOD COMPLEX 30 MIN: CPT

## 2020-03-18 PROCEDURE — 94640 AIRWAY INHALATION TREATMENT: CPT

## 2020-03-18 PROCEDURE — 7100000001 HC PACU RECOVERY - ADDTL 15 MIN: Performed by: ORTHOPAEDIC SURGERY

## 2020-03-18 PROCEDURE — 6360000002 HC RX W HCPCS: Performed by: ORTHOPAEDIC SURGERY

## 2020-03-18 PROCEDURE — 2500000003 HC RX 250 WO HCPCS: Performed by: NURSE ANESTHETIST, CERTIFIED REGISTERED

## 2020-03-18 PROCEDURE — 2580000003 HC RX 258: Performed by: ORTHOPAEDIC SURGERY

## 2020-03-18 PROCEDURE — 86900 BLOOD TYPING SEROLOGIC ABO: CPT

## 2020-03-18 PROCEDURE — 2580000003 HC RX 258: Performed by: NURSE ANESTHETIST, CERTIFIED REGISTERED

## 2020-03-18 PROCEDURE — 86901 BLOOD TYPING SEROLOGIC RH(D): CPT

## 2020-03-18 PROCEDURE — 2720000010 HC SURG SUPPLY STERILE: Performed by: ORTHOPAEDIC SURGERY

## 2020-03-18 PROCEDURE — 3600000004 HC SURGERY LEVEL 4 BASE: Performed by: ORTHOPAEDIC SURGERY

## 2020-03-18 PROCEDURE — 6360000002 HC RX W HCPCS: Performed by: PEDIATRICS

## 2020-03-18 PROCEDURE — 7100000000 HC PACU RECOVERY - FIRST 15 MIN: Performed by: ORTHOPAEDIC SURGERY

## 2020-03-18 PROCEDURE — 1200000000 HC SEMI PRIVATE

## 2020-03-18 PROCEDURE — 2500000003 HC RX 250 WO HCPCS

## 2020-03-18 PROCEDURE — 70450 CT HEAD/BRAIN W/O DYE: CPT

## 2020-03-18 PROCEDURE — 73502 X-RAY EXAM HIP UNI 2-3 VIEWS: CPT

## 2020-03-18 PROCEDURE — 2700000000 HC OXYGEN THERAPY PER DAY

## 2020-03-18 PROCEDURE — 6360000002 HC RX W HCPCS: Performed by: NURSE ANESTHETIST, CERTIFIED REGISTERED

## 2020-03-18 PROCEDURE — 3209999900 FLUORO FOR SURGICAL PROCEDURES

## 2020-03-18 PROCEDURE — 94761 N-INVAS EAR/PLS OXIMETRY MLT: CPT

## 2020-03-18 PROCEDURE — 96376 TX/PRO/DX INJ SAME DRUG ADON: CPT

## 2020-03-18 PROCEDURE — 72192 CT PELVIS W/O DYE: CPT

## 2020-03-18 PROCEDURE — 2709999900 HC NON-CHARGEABLE SUPPLY: Performed by: ORTHOPAEDIC SURGERY

## 2020-03-18 PROCEDURE — 3600000014 HC SURGERY LEVEL 4 ADDTL 15MIN: Performed by: ORTHOPAEDIC SURGERY

## 2020-03-18 PROCEDURE — 85025 COMPLETE CBC W/AUTO DIFF WBC: CPT

## 2020-03-18 PROCEDURE — 86850 RBC ANTIBODY SCREEN: CPT

## 2020-03-18 PROCEDURE — C1769 GUIDE WIRE: HCPCS | Performed by: ORTHOPAEDIC SURGERY

## 2020-03-18 PROCEDURE — 93005 ELECTROCARDIOGRAM TRACING: CPT | Performed by: NURSE PRACTITIONER

## 2020-03-18 PROCEDURE — 96374 THER/PROPH/DIAG INJ IV PUSH: CPT

## 2020-03-18 PROCEDURE — 99222 1ST HOSP IP/OBS MODERATE 55: CPT | Performed by: ORTHOPAEDIC SURGERY

## 2020-03-18 PROCEDURE — C1713 ANCHOR/SCREW BN/BN,TIS/BN: HCPCS | Performed by: ORTHOPAEDIC SURGERY

## 2020-03-18 PROCEDURE — 6360000002 HC RX W HCPCS: Performed by: ANESTHESIOLOGY

## 2020-03-18 PROCEDURE — 6370000000 HC RX 637 (ALT 250 FOR IP): Performed by: PEDIATRICS

## 2020-03-18 PROCEDURE — 93010 ELECTROCARDIOGRAM REPORT: CPT | Performed by: INTERNAL MEDICINE

## 2020-03-18 PROCEDURE — 71045 X-RAY EXAM CHEST 1 VIEW: CPT

## 2020-03-18 PROCEDURE — 6370000000 HC RX 637 (ALT 250 FOR IP): Performed by: ORTHOPAEDIC SURGERY

## 2020-03-18 PROCEDURE — 2500000003 HC RX 250 WO HCPCS: Performed by: ORTHOPAEDIC SURGERY

## 2020-03-18 PROCEDURE — 97530 THERAPEUTIC ACTIVITIES: CPT

## 2020-03-18 PROCEDURE — 84484 ASSAY OF TROPONIN QUANT: CPT

## 2020-03-18 PROCEDURE — 6360000002 HC RX W HCPCS: Performed by: NURSE PRACTITIONER

## 2020-03-18 PROCEDURE — 27245 TREAT THIGH FRACTURE: CPT | Performed by: ORTHOPAEDIC SURGERY

## 2020-03-18 PROCEDURE — 81003 URINALYSIS AUTO W/O SCOPE: CPT

## 2020-03-18 PROCEDURE — 3700000000 HC ANESTHESIA ATTENDED CARE: Performed by: ORTHOPAEDIC SURGERY

## 2020-03-18 PROCEDURE — 6370000000 HC RX 637 (ALT 250 FOR IP): Performed by: ANESTHESIOLOGY

## 2020-03-18 PROCEDURE — 85610 PROTHROMBIN TIME: CPT

## 2020-03-18 PROCEDURE — 3700000001 HC ADD 15 MINUTES (ANESTHESIA): Performed by: ORTHOPAEDIC SURGERY

## 2020-03-18 PROCEDURE — 6370000000 HC RX 637 (ALT 250 FOR IP): Performed by: HOSPITALIST

## 2020-03-18 DEVICE — LONG NAIL KIT R1.5, TI, RIGHT
Type: IMPLANTABLE DEVICE | Site: HIP | Status: FUNCTIONAL
Brand: GAMMA

## 2020-03-18 DEVICE — LOCKING SCREW, FULLY THREADED: Type: IMPLANTABLE DEVICE | Site: HIP | Status: FUNCTIONAL

## 2020-03-18 DEVICE — LAG SCREW, TI
Type: IMPLANTABLE DEVICE | Site: HIP | Status: FUNCTIONAL
Brand: GAMMA

## 2020-03-18 RX ORDER — ONDANSETRON 2 MG/ML
4 INJECTION INTRAMUSCULAR; INTRAVENOUS
Status: DISCONTINUED | OUTPATIENT
Start: 2020-03-18 | End: 2020-03-18 | Stop reason: HOSPADM

## 2020-03-18 RX ORDER — QUETIAPINE FUMARATE 100 MG/1
100 TABLET, FILM COATED ORAL NIGHTLY PRN
Status: DISCONTINUED | OUTPATIENT
Start: 2020-03-18 | End: 2020-03-21 | Stop reason: HOSPADM

## 2020-03-18 RX ORDER — SODIUM CHLORIDE 9 MG/ML
INJECTION, SOLUTION INTRAVENOUS CONTINUOUS PRN
Status: DISCONTINUED | OUTPATIENT
Start: 2020-03-18 | End: 2020-03-18 | Stop reason: SDUPTHER

## 2020-03-18 RX ORDER — DULOXETIN HYDROCHLORIDE 60 MG/1
60 CAPSULE, DELAYED RELEASE ORAL DAILY
Status: DISCONTINUED | OUTPATIENT
Start: 2020-03-18 | End: 2020-03-21 | Stop reason: HOSPADM

## 2020-03-18 RX ORDER — DIAZEPAM 5 MG/1
5 TABLET ORAL EVERY 6 HOURS PRN
Status: DISCONTINUED | OUTPATIENT
Start: 2020-03-18 | End: 2020-03-18

## 2020-03-18 RX ORDER — SODIUM CHLORIDE 0.9 % (FLUSH) 0.9 %
10 SYRINGE (ML) INJECTION PRN
Status: DISCONTINUED | OUTPATIENT
Start: 2020-03-18 | End: 2020-03-21 | Stop reason: HOSPADM

## 2020-03-18 RX ORDER — PROMETHAZINE HYDROCHLORIDE 25 MG/ML
6.25 INJECTION, SOLUTION INTRAMUSCULAR; INTRAVENOUS
Status: DISCONTINUED | OUTPATIENT
Start: 2020-03-18 | End: 2020-03-18 | Stop reason: HOSPADM

## 2020-03-18 RX ORDER — DEXTROSE MONOHYDRATE 25 G/50ML
12.5 INJECTION, SOLUTION INTRAVENOUS PRN
Status: DISCONTINUED | OUTPATIENT
Start: 2020-03-18 | End: 2020-03-21 | Stop reason: HOSPADM

## 2020-03-18 RX ORDER — ACETAMINOPHEN 325 MG/1
650 TABLET ORAL EVERY 6 HOURS PRN
Status: DISCONTINUED | OUTPATIENT
Start: 2020-03-18 | End: 2020-03-21 | Stop reason: HOSPADM

## 2020-03-18 RX ORDER — DOCUSATE SODIUM 100 MG/1
100 CAPSULE, LIQUID FILLED ORAL DAILY
Status: DISCONTINUED | OUTPATIENT
Start: 2020-03-18 | End: 2020-03-21 | Stop reason: HOSPADM

## 2020-03-18 RX ORDER — SODIUM CHLORIDE 0.9 % (FLUSH) 0.9 %
10 SYRINGE (ML) INJECTION EVERY 12 HOURS SCHEDULED
Status: DISCONTINUED | OUTPATIENT
Start: 2020-03-18 | End: 2020-03-21 | Stop reason: HOSPADM

## 2020-03-18 RX ORDER — AMLODIPINE BESYLATE 10 MG/1
10 TABLET ORAL DAILY
Status: DISCONTINUED | OUTPATIENT
Start: 2020-03-18 | End: 2020-03-19

## 2020-03-18 RX ORDER — QUETIAPINE FUMARATE 25 MG/1
25 TABLET, FILM COATED ORAL NIGHTLY PRN
Status: DISCONTINUED | OUTPATIENT
Start: 2020-03-18 | End: 2020-03-18

## 2020-03-18 RX ORDER — ATORVASTATIN CALCIUM 80 MG/1
80 TABLET, FILM COATED ORAL NIGHTLY
Status: DISCONTINUED | OUTPATIENT
Start: 2020-03-18 | End: 2020-03-18

## 2020-03-18 RX ORDER — ONDANSETRON 2 MG/ML
4 INJECTION INTRAMUSCULAR; INTRAVENOUS EVERY 6 HOURS PRN
Status: DISCONTINUED | OUTPATIENT
Start: 2020-03-18 | End: 2020-03-18

## 2020-03-18 RX ORDER — HYDRALAZINE HYDROCHLORIDE 20 MG/ML
5 INJECTION INTRAMUSCULAR; INTRAVENOUS EVERY 10 MIN PRN
Status: DISCONTINUED | OUTPATIENT
Start: 2020-03-18 | End: 2020-03-18 | Stop reason: HOSPADM

## 2020-03-18 RX ORDER — PANTOPRAZOLE SODIUM 40 MG/1
40 TABLET, DELAYED RELEASE ORAL DAILY PRN
Status: DISCONTINUED | OUTPATIENT
Start: 2020-03-18 | End: 2020-03-21 | Stop reason: HOSPADM

## 2020-03-18 RX ORDER — FENTANYL CITRATE 50 UG/ML
INJECTION, SOLUTION INTRAMUSCULAR; INTRAVENOUS PRN
Status: DISCONTINUED | OUTPATIENT
Start: 2020-03-18 | End: 2020-03-18 | Stop reason: SDUPTHER

## 2020-03-18 RX ORDER — MEPERIDINE HYDROCHLORIDE 25 MG/ML
12.5 INJECTION INTRAMUSCULAR; INTRAVENOUS; SUBCUTANEOUS ONCE
Status: DISCONTINUED | OUTPATIENT
Start: 2020-03-18 | End: 2020-03-18 | Stop reason: HOSPADM

## 2020-03-18 RX ORDER — ATORVASTATIN CALCIUM 10 MG/1
10 TABLET, FILM COATED ORAL NIGHTLY
Status: DISCONTINUED | OUTPATIENT
Start: 2020-03-18 | End: 2020-03-21 | Stop reason: HOSPADM

## 2020-03-18 RX ORDER — ONDANSETRON 2 MG/ML
INJECTION INTRAMUSCULAR; INTRAVENOUS PRN
Status: DISCONTINUED | OUTPATIENT
Start: 2020-03-18 | End: 2020-03-18 | Stop reason: SDUPTHER

## 2020-03-18 RX ORDER — OXYCODONE HYDROCHLORIDE 5 MG/1
5 TABLET ORAL EVERY 4 HOURS PRN
Status: DISCONTINUED | OUTPATIENT
Start: 2020-03-18 | End: 2020-03-21 | Stop reason: HOSPADM

## 2020-03-18 RX ORDER — MORPHINE SULFATE 4 MG/ML
4 INJECTION, SOLUTION INTRAMUSCULAR; INTRAVENOUS ONCE
Status: COMPLETED | OUTPATIENT
Start: 2020-03-18 | End: 2020-03-18

## 2020-03-18 RX ORDER — MORPHINE SULFATE 4 MG/ML
3 INJECTION, SOLUTION INTRAMUSCULAR; INTRAVENOUS EVERY 4 HOURS PRN
Status: DISCONTINUED | OUTPATIENT
Start: 2020-03-18 | End: 2020-03-18

## 2020-03-18 RX ORDER — PROPOFOL 10 MG/ML
INJECTION, EMULSION INTRAVENOUS PRN
Status: DISCONTINUED | OUTPATIENT
Start: 2020-03-18 | End: 2020-03-18 | Stop reason: SDUPTHER

## 2020-03-18 RX ORDER — EPHEDRINE SULFATE/0.9% NACL/PF 50 MG/5 ML
SYRINGE (ML) INTRAVENOUS PRN
Status: DISCONTINUED | OUTPATIENT
Start: 2020-03-18 | End: 2020-03-18 | Stop reason: SDUPTHER

## 2020-03-18 RX ORDER — FENTANYL CITRATE 50 UG/ML
50 INJECTION, SOLUTION INTRAMUSCULAR; INTRAVENOUS EVERY 5 MIN PRN
Status: DISCONTINUED | OUTPATIENT
Start: 2020-03-18 | End: 2020-03-18 | Stop reason: HOSPADM

## 2020-03-18 RX ORDER — ROCURONIUM BROMIDE 10 MG/ML
INJECTION, SOLUTION INTRAVENOUS PRN
Status: DISCONTINUED | OUTPATIENT
Start: 2020-03-18 | End: 2020-03-18 | Stop reason: SDUPTHER

## 2020-03-18 RX ORDER — LIDOCAINE HYDROCHLORIDE 20 MG/ML
INJECTION, SOLUTION EPIDURAL; INFILTRATION; INTRACAUDAL; PERINEURAL PRN
Status: DISCONTINUED | OUTPATIENT
Start: 2020-03-18 | End: 2020-03-18 | Stop reason: SDUPTHER

## 2020-03-18 RX ORDER — DIAZEPAM 5 MG/ML
5 INJECTION, SOLUTION INTRAMUSCULAR; INTRAVENOUS EVERY 6 HOURS PRN
Status: DISCONTINUED | OUTPATIENT
Start: 2020-03-18 | End: 2020-03-18

## 2020-03-18 RX ORDER — SENNA AND DOCUSATE SODIUM 50; 8.6 MG/1; MG/1
1 TABLET, FILM COATED ORAL 2 TIMES DAILY
Status: DISCONTINUED | OUTPATIENT
Start: 2020-03-18 | End: 2020-03-21 | Stop reason: HOSPADM

## 2020-03-18 RX ORDER — SODIUM CHLORIDE 9 MG/ML
INJECTION, SOLUTION INTRAVENOUS CONTINUOUS
Status: DISCONTINUED | OUTPATIENT
Start: 2020-03-18 | End: 2020-03-21 | Stop reason: HOSPADM

## 2020-03-18 RX ORDER — SODIUM CHLORIDE 0.9 % (FLUSH) 0.9 %
10 SYRINGE (ML) INJECTION PRN
Status: DISCONTINUED | OUTPATIENT
Start: 2020-03-18 | End: 2020-03-18

## 2020-03-18 RX ORDER — IPRATROPIUM BROMIDE AND ALBUTEROL SULFATE 2.5; .5 MG/3ML; MG/3ML
1 SOLUTION RESPIRATORY (INHALATION) ONCE
Status: COMPLETED | OUTPATIENT
Start: 2020-03-18 | End: 2020-03-18

## 2020-03-18 RX ORDER — MAGNESIUM HYDROXIDE 1200 MG/15ML
LIQUID ORAL CONTINUOUS PRN
Status: COMPLETED | OUTPATIENT
Start: 2020-03-18 | End: 2020-03-18

## 2020-03-18 RX ORDER — DEXTROSE MONOHYDRATE 50 MG/ML
100 INJECTION, SOLUTION INTRAVENOUS PRN
Status: DISCONTINUED | OUTPATIENT
Start: 2020-03-18 | End: 2020-03-21 | Stop reason: HOSPADM

## 2020-03-18 RX ORDER — LISINOPRIL 20 MG/1
20 TABLET ORAL DAILY
Status: DISCONTINUED | OUTPATIENT
Start: 2020-03-18 | End: 2020-03-19

## 2020-03-18 RX ORDER — HYDROCODONE BITARTRATE AND ACETAMINOPHEN 5; 325 MG/1; MG/1
1 TABLET ORAL EVERY 4 HOURS PRN
Status: DISCONTINUED | OUTPATIENT
Start: 2020-03-18 | End: 2020-03-18

## 2020-03-18 RX ORDER — HYDROCODONE BITARTRATE AND ACETAMINOPHEN 5; 325 MG/1; MG/1
1 TABLET ORAL PRN
Status: DISCONTINUED | OUTPATIENT
Start: 2020-03-18 | End: 2020-03-18 | Stop reason: HOSPADM

## 2020-03-18 RX ORDER — HYDROCODONE BITARTRATE AND ACETAMINOPHEN 5; 325 MG/1; MG/1
2 TABLET ORAL PRN
Status: DISCONTINUED | OUTPATIENT
Start: 2020-03-18 | End: 2020-03-18 | Stop reason: HOSPADM

## 2020-03-18 RX ORDER — FENTANYL CITRATE 50 UG/ML
25 INJECTION, SOLUTION INTRAMUSCULAR; INTRAVENOUS EVERY 5 MIN PRN
Status: DISCONTINUED | OUTPATIENT
Start: 2020-03-18 | End: 2020-03-18 | Stop reason: HOSPADM

## 2020-03-18 RX ORDER — NICOTINE POLACRILEX 4 MG
15 LOZENGE BUCCAL PRN
Status: DISCONTINUED | OUTPATIENT
Start: 2020-03-18 | End: 2020-03-21 | Stop reason: HOSPADM

## 2020-03-18 RX ORDER — ATENOLOL 50 MG/1
50 TABLET ORAL DAILY
Status: DISCONTINUED | OUTPATIENT
Start: 2020-03-18 | End: 2020-03-19

## 2020-03-18 RX ORDER — SODIUM CHLORIDE 0.9 % (FLUSH) 0.9 %
10 SYRINGE (ML) INJECTION EVERY 12 HOURS SCHEDULED
Status: DISCONTINUED | OUTPATIENT
Start: 2020-03-18 | End: 2020-03-18

## 2020-03-18 RX ORDER — OXYCODONE HYDROCHLORIDE 10 MG/1
10 TABLET ORAL EVERY 4 HOURS PRN
Status: DISCONTINUED | OUTPATIENT
Start: 2020-03-18 | End: 2020-03-21 | Stop reason: HOSPADM

## 2020-03-18 RX ORDER — ACETAMINOPHEN 650 MG/1
650 SUPPOSITORY RECTAL EVERY 6 HOURS PRN
Status: DISCONTINUED | OUTPATIENT
Start: 2020-03-18 | End: 2020-03-21 | Stop reason: HOSPADM

## 2020-03-18 RX ORDER — SUCCINYLCHOLINE/SOD CL,ISO/PF 200MG/10ML
SYRINGE (ML) INTRAVENOUS PRN
Status: DISCONTINUED | OUTPATIENT
Start: 2020-03-18 | End: 2020-03-18 | Stop reason: SDUPTHER

## 2020-03-18 RX ORDER — BUPIVACAINE HYDROCHLORIDE 5 MG/ML
INJECTION, SOLUTION EPIDURAL; INTRACAUDAL
Status: COMPLETED | OUTPATIENT
Start: 2020-03-18 | End: 2020-03-18

## 2020-03-18 RX ORDER — ALPRAZOLAM 0.5 MG/1
0.5 TABLET ORAL 2 TIMES DAILY PRN
Status: DISCONTINUED | OUTPATIENT
Start: 2020-03-18 | End: 2020-03-21 | Stop reason: HOSPADM

## 2020-03-18 RX ORDER — ATORVASTATIN CALCIUM 10 MG/1
10 TABLET, FILM COATED ORAL NIGHTLY
COMMUNITY

## 2020-03-18 RX ADMIN — HYDROMORPHONE HYDROCHLORIDE 0.25 MG: 1 INJECTION, SOLUTION INTRAMUSCULAR; INTRAVENOUS; SUBCUTANEOUS at 10:04

## 2020-03-18 RX ADMIN — FENTANYL CITRATE 50 MCG: 50 INJECTION, SOLUTION INTRAMUSCULAR; INTRAVENOUS at 11:25

## 2020-03-18 RX ADMIN — INSULIN LISPRO 1 UNITS: 100 INJECTION, SOLUTION INTRAVENOUS; SUBCUTANEOUS at 17:02

## 2020-03-18 RX ADMIN — PROPOFOL 50 MG: 10 INJECTION, EMULSION INTRAVENOUS at 09:15

## 2020-03-18 RX ADMIN — ALPRAZOLAM 0.5 MG: 0.5 TABLET ORAL at 20:32

## 2020-03-18 RX ADMIN — LIDOCAINE HYDROCHLORIDE 100 MG: 20 INJECTION, SOLUTION EPIDURAL; INFILTRATION; INTRACAUDAL; PERINEURAL at 09:03

## 2020-03-18 RX ADMIN — MORPHINE SULFATE 4 MG: 4 INJECTION, SOLUTION INTRAMUSCULAR; INTRAVENOUS at 00:58

## 2020-03-18 RX ADMIN — INSULIN LISPRO 1 UNITS: 100 INJECTION, SOLUTION INTRAVENOUS; SUBCUTANEOUS at 20:53

## 2020-03-18 RX ADMIN — SODIUM CHLORIDE: 9 INJECTION, SOLUTION INTRAVENOUS at 13:38

## 2020-03-18 RX ADMIN — SENNOSIDES AND DOCUSATE SODIUM 1 TABLET: 8.6; 5 TABLET ORAL at 13:34

## 2020-03-18 RX ADMIN — SODIUM CHLORIDE, PRESERVATIVE FREE 10 ML: 5 INJECTION INTRAVENOUS at 20:38

## 2020-03-18 RX ADMIN — SENNOSIDES AND DOCUSATE SODIUM 1 TABLET: 8.6; 5 TABLET ORAL at 20:32

## 2020-03-18 RX ADMIN — MORPHINE SULFATE 4 MG: 4 INJECTION, SOLUTION INTRAMUSCULAR; INTRAVENOUS at 00:17

## 2020-03-18 RX ADMIN — SUGAMMADEX 160 MG: 100 INJECTION, SOLUTION INTRAVENOUS at 10:07

## 2020-03-18 RX ADMIN — CEFAZOLIN SODIUM 2 G: 10 INJECTION, POWDER, FOR SOLUTION INTRAVENOUS at 17:02

## 2020-03-18 RX ADMIN — Medication 10 MG: at 09:30

## 2020-03-18 RX ADMIN — ROCURONIUM BROMIDE 20 MG: 10 INJECTION INTRAVENOUS at 09:15

## 2020-03-18 RX ADMIN — OXYCODONE HYDROCHLORIDE 10 MG: 10 TABLET ORAL at 13:33

## 2020-03-18 RX ADMIN — FENTANYL CITRATE 50 MCG: 50 INJECTION INTRAMUSCULAR; INTRAVENOUS at 09:00

## 2020-03-18 RX ADMIN — INSULIN LISPRO 1 UNITS: 100 INJECTION, SOLUTION INTRAVENOUS; SUBCUTANEOUS at 13:33

## 2020-03-18 RX ADMIN — SODIUM CHLORIDE: 9 INJECTION, SOLUTION INTRAVENOUS at 08:59

## 2020-03-18 RX ADMIN — PROPOFOL 150 MG: 10 INJECTION, EMULSION INTRAVENOUS at 09:03

## 2020-03-18 RX ADMIN — Medication 120 MG: at 09:03

## 2020-03-18 RX ADMIN — DIAZEPAM 5 MG: 5 TABLET ORAL at 05:00

## 2020-03-18 RX ADMIN — FENTANYL CITRATE 50 MCG: 50 INJECTION INTRAMUSCULAR; INTRAVENOUS at 09:15

## 2020-03-18 RX ADMIN — HYDROMORPHONE HYDROCHLORIDE 0.5 MG: 1 INJECTION, SOLUTION INTRAMUSCULAR; INTRAVENOUS; SUBCUTANEOUS at 16:21

## 2020-03-18 RX ADMIN — ONDANSETRON 4 MG: 2 INJECTION INTRAMUSCULAR; INTRAVENOUS at 09:00

## 2020-03-18 RX ADMIN — OXYCODONE HYDROCHLORIDE 10 MG: 10 TABLET ORAL at 18:41

## 2020-03-18 RX ADMIN — CEFAZOLIN SODIUM 2 G: 10 INJECTION, POWDER, FOR SOLUTION INTRAVENOUS at 08:59

## 2020-03-18 RX ADMIN — ATORVASTATIN CALCIUM 10 MG: 10 TABLET, FILM COATED ORAL at 20:32

## 2020-03-18 RX ADMIN — HYDROMORPHONE HYDROCHLORIDE 0.5 MG: 1 INJECTION, SOLUTION INTRAMUSCULAR; INTRAVENOUS; SUBCUTANEOUS at 20:33

## 2020-03-18 RX ADMIN — ALPRAZOLAM 0.5 MG: 0.5 TABLET ORAL at 02:02

## 2020-03-18 RX ADMIN — HYDROCODONE BITARTRATE AND ACETAMINOPHEN 1 TABLET: 5; 325 TABLET ORAL at 06:21

## 2020-03-18 RX ADMIN — ROCURONIUM BROMIDE 5 MG: 10 INJECTION INTRAVENOUS at 09:03

## 2020-03-18 RX ADMIN — IPRATROPIUM BROMIDE AND ALBUTEROL SULFATE 1 AMPULE: .5; 3 SOLUTION RESPIRATORY (INHALATION) at 11:11

## 2020-03-18 RX ADMIN — DULOXETINE HYDROCHLORIDE 60 MG: 60 CAPSULE, DELAYED RELEASE ORAL at 13:34

## 2020-03-18 RX ADMIN — Medication 5 MG: at 09:28

## 2020-03-18 RX ADMIN — HYDROMORPHONE HYDROCHLORIDE 0.5 MG: 1 INJECTION, SOLUTION INTRAMUSCULAR; INTRAVENOUS; SUBCUTANEOUS at 04:19

## 2020-03-18 ASSESSMENT — ENCOUNTER SYMPTOMS
COLOR CHANGE: 0
VOMITING: 0
NAUSEA: 0
FACIAL SWELLING: 0
SHORTNESS OF BREATH: 0

## 2020-03-18 ASSESSMENT — PAIN DESCRIPTION - LOCATION
LOCATION: HIP

## 2020-03-18 ASSESSMENT — PAIN DESCRIPTION - FREQUENCY
FREQUENCY: CONTINUOUS

## 2020-03-18 ASSESSMENT — PAIN DESCRIPTION - PROGRESSION
CLINICAL_PROGRESSION: GRADUALLY IMPROVING
CLINICAL_PROGRESSION: NOT CHANGED
CLINICAL_PROGRESSION: GRADUALLY WORSENING
CLINICAL_PROGRESSION: GRADUALLY IMPROVING
CLINICAL_PROGRESSION: GRADUALLY IMPROVING
CLINICAL_PROGRESSION: NOT CHANGED
CLINICAL_PROGRESSION: GRADUALLY IMPROVING
CLINICAL_PROGRESSION: NOT CHANGED
CLINICAL_PROGRESSION: GRADUALLY WORSENING
CLINICAL_PROGRESSION: GRADUALLY WORSENING

## 2020-03-18 ASSESSMENT — PULMONARY FUNCTION TESTS
PIF_VALUE: 19
PIF_VALUE: 23
PIF_VALUE: 2
PIF_VALUE: 3
PIF_VALUE: 15
PIF_VALUE: 3
PIF_VALUE: 20
PIF_VALUE: 19
PIF_VALUE: 28
PIF_VALUE: 15
PIF_VALUE: 20
PIF_VALUE: 19
PIF_VALUE: 19
PIF_VALUE: 2
PIF_VALUE: 20
PIF_VALUE: 15
PIF_VALUE: 19
PIF_VALUE: 20
PIF_VALUE: 19
PIF_VALUE: 22
PIF_VALUE: 19
PIF_VALUE: 3
PIF_VALUE: 19
PIF_VALUE: 1
PIF_VALUE: 20
PIF_VALUE: 19
PIF_VALUE: 15
PIF_VALUE: 20
PIF_VALUE: 6
PIF_VALUE: 3
PIF_VALUE: 1
PIF_VALUE: 20
PIF_VALUE: 20
PIF_VALUE: 19
PIF_VALUE: 23
PIF_VALUE: 3
PIF_VALUE: 6
PIF_VALUE: 1
PIF_VALUE: 23
PIF_VALUE: 19
PIF_VALUE: 21
PIF_VALUE: 23
PIF_VALUE: 3
PIF_VALUE: 3
PIF_VALUE: 20
PIF_VALUE: 0
PIF_VALUE: 19
PIF_VALUE: 1
PIF_VALUE: 3
PIF_VALUE: 2
PIF_VALUE: 20
PIF_VALUE: 19
PIF_VALUE: 24
PIF_VALUE: 1
PIF_VALUE: 15
PIF_VALUE: 2
PIF_VALUE: 23
PIF_VALUE: 20
PIF_VALUE: 15
PIF_VALUE: 19
PIF_VALUE: 2
PIF_VALUE: 16
PIF_VALUE: 19
PIF_VALUE: 19
PIF_VALUE: 20
PIF_VALUE: 1
PIF_VALUE: 19
PIF_VALUE: 1
PIF_VALUE: 19
PIF_VALUE: 3
PIF_VALUE: 1
PIF_VALUE: 3
PIF_VALUE: 0
PIF_VALUE: 15
PIF_VALUE: 20
PIF_VALUE: 1
PIF_VALUE: 20
PIF_VALUE: 9
PIF_VALUE: 20
PIF_VALUE: 2
PIF_VALUE: 12
PIF_VALUE: 1
PIF_VALUE: 22
PIF_VALUE: 3
PIF_VALUE: 16
PIF_VALUE: 0
PIF_VALUE: 20
PIF_VALUE: 3
PIF_VALUE: 14
PIF_VALUE: 20
PIF_VALUE: 3
PIF_VALUE: 15

## 2020-03-18 ASSESSMENT — PAIN DESCRIPTION - ORIENTATION
ORIENTATION: RIGHT

## 2020-03-18 ASSESSMENT — PAIN - FUNCTIONAL ASSESSMENT
PAIN_FUNCTIONAL_ASSESSMENT: PREVENTS OR INTERFERES SOME ACTIVE ACTIVITIES AND ADLS

## 2020-03-18 ASSESSMENT — PAIN DESCRIPTION - PAIN TYPE
TYPE: SURGICAL PAIN
TYPE: ACUTE PAIN
TYPE: ACUTE PAIN
TYPE: SURGICAL PAIN
TYPE: ACUTE PAIN
TYPE: ACUTE PAIN
TYPE: SURGICAL PAIN
TYPE: ACUTE PAIN
TYPE: SURGICAL PAIN
TYPE: SURGICAL PAIN
TYPE: ACUTE PAIN
TYPE: SURGICAL PAIN

## 2020-03-18 ASSESSMENT — PAIN DESCRIPTION - DIRECTION
RADIATING_TOWARDS: TO KNEE

## 2020-03-18 ASSESSMENT — PAIN DESCRIPTION - DESCRIPTORS
DESCRIPTORS: SHARP
DESCRIPTORS: SHARP;CRAMPING
DESCRIPTORS: ACHING
DESCRIPTORS: SHARP
DESCRIPTORS: ACHING
DESCRIPTORS: SHARP
DESCRIPTORS: ACHING
DESCRIPTORS: SHARP
DESCRIPTORS: SHARP

## 2020-03-18 ASSESSMENT — PAIN DESCRIPTION - ONSET
ONSET: ON-GOING
ONSET: GRADUAL
ONSET: ON-GOING

## 2020-03-18 ASSESSMENT — PAIN SCALES - GENERAL
PAINLEVEL_OUTOF10: 0
PAINLEVEL_OUTOF10: 10
PAINLEVEL_OUTOF10: 10
PAINLEVEL_OUTOF10: 4
PAINLEVEL_OUTOF10: 10
PAINLEVEL_OUTOF10: 10
PAINLEVEL_OUTOF10: 9
PAINLEVEL_OUTOF10: 6
PAINLEVEL_OUTOF10: 3
PAINLEVEL_OUTOF10: 8
PAINLEVEL_OUTOF10: 10
PAINLEVEL_OUTOF10: 8
PAINLEVEL_OUTOF10: 0
PAINLEVEL_OUTOF10: 5
PAINLEVEL_OUTOF10: 7
PAINLEVEL_OUTOF10: 7
PAINLEVEL_OUTOF10: 5
PAINLEVEL_OUTOF10: 2

## 2020-03-18 ASSESSMENT — PAIN SCALES - WONG BAKER: WONGBAKER_NUMERICALRESPONSE: 0

## 2020-03-18 ASSESSMENT — LIFESTYLE VARIABLES: SMOKING_STATUS: 0

## 2020-03-18 NOTE — H&P
Hospital Medicine History & Physical      PCP: Sherley Barron MD    Date of Admission: 3/17/2020    Date of Service: Pt seen/examined on 20 and Admitted to Inpatient with expected LOS greater than two midnights due to medical therapy. Chief Complaint:  Fall, right hip pain       History Of Present Illness:       69 y.o. female who presented to [facility] - she reports she had stoke this past December - subsequently with drop foot. She got up to get some water, but then felt dizzy/funny. She reports she took insomnia as a sleep aid just prior. She reports that she leaned into the wall and then slid down it. Her daughter lives with her and came to check on her. She was concerned because of the way her leg was twisted when she landed. EMS summoned, presented to the ED, R hip fracture on x-ray. She has a hx of sciatica (also on right), reports she hasn't been able walk much lately consequently.      ROS:   No fever   No chills   No headache   Vision ok   Hearing ok   No rhinitis   No sore throat   No chest pain   No head injury   No loss of consciousness   No dyspnea   No cough   No wheeze   No nausea   No vomiting   No abdominal pain   No diarrhea   No constipation   No dysuria   No epistaxis / blood in stool   Denies skin wounds  / rashes   Endo: DM   Mskel: R hip pain     FamHx:   - Mom -  of MI at age 61   - [de-identified] -  from hodgkin's lymphoma     SocHx:   - does not smoke, previously smoked in her 25s   - no alcohol   -  x 2   - have 2 children (lives with a daughter) in a condo   - retired (previously a echo tech)     Past Medical History:          Diagnosis Date    Arthritis     Back pain     Cancer ([address]     breast, Left Lumpectomy and radiation      Diabetes     High blood pressure     Hyperlipidemia     Seizure ([address]     10 yrs ago     SVT (supraventricular tachycardia) ([address]     Type II or unspecified type diabetes mellitus without mention of complication, not Provider, MD   DULoxetine (CYMBALTA) 60 MG extended release capsule Take 60 mg by mouth daily    Yes Historical Provider, MD   SUMAtriptan (IMITREX) 50 MG tablet Take 1 tablet by mouth once as needed for Migraine 12/13/19 12/13/19  Ami Miranda MD       Allergies:  Lexapro [escitalopram oxalate] and Wellbutrin [bupropion]    Social History:      The patient currently lives at home with her daughter in a condo     TOBACCO:   reports that she has quit smoking. She has never used smokeless tobacco.  ETOH:   reports no history of alcohol use. E-Cigarettes Vaping or Juuling     Questions Responses    Vaping Use     Start Date     Does device contain nicotine? Quit Date     Vaping Type             Family History:       as noted         Problem Relation Age of Onset    Heart Disease Mother     Cancer Father        REVIEW OF SYSTEMS:   Pertinent positives as noted in the HPI. All other systems reviewed and negative. PHYSICAL EXAM PERFORMED:    /75   Pulse 65   Temp 97.5 °F (36.4 °C) (Oral)   Resp 18   Ht 5' 6\" (1.676 m)   Wt 173 lb 15.1 oz (78.9 kg)   SpO2 95%   BMI 28.08 kg/m²     General appearance:  No apparent distress, appears stated age and cooperative. HEENT:  Normal cephalic, atraumatic without obvious deformity. Pupils equal, round, and reactive to light. Extra ocular muscles intact. Conjunctivae/corneas clear. Neck: Supple, with full range of motion. Respiratory:  Normal respiratory effort. Clear to auscultation, bilaterally without Rales/Wheezes/Rhonchi. Cardiovascular:  Regular rate and rhythm with normal S1/S2 without murmurs, rubs or gallops. Abdomen: Soft, non-tender, non-distended   Musculoskeletal:    - deformity over her right hip, with tenderness     Skin: Skin color, texture, turgor normal.  No rashes or lesions.   Neurologic:     - sensation to light touch intact in her feet   - mild facial asymmetry   - speech clear     Psychiatric:  Alert and oriented, thought content

## 2020-03-18 NOTE — PROGRESS NOTES
Per Dr. Narendra Potter, patient is not to receive any blood pressure medications today. RN okay to resume BP meds tomorrow (3/19/2020).      Electronically signed by Angella Haile RN on 3/18/2020 at 2:16 PM

## 2020-03-18 NOTE — PROGRESS NOTES
Consent signed by patient for Right hip intramedullary nailing with Dr. Miki Pineda and placed into patient chart.

## 2020-03-18 NOTE — PROGRESS NOTES
oxyCODONE, HYDROmorphone    IV:   dextrose      sodium chloride           Intake/Output Summary (Last 24 hours) at 3/18/2020 1245  Last data filed at 3/18/2020 1139  Gross per 24 hour   Intake 1320 ml   Output 450 ml   Net 870 ml       Results:  CBC:   Recent Labs     03/18/20  0020   WBC 14.9*   HGB 13.5   HCT 41.1   MCV 94.1        BMP:   Recent Labs     03/18/20  0020      K 4.3   CL 97*   CO2 22   BUN 19   CREATININE 1.0     Mag: No results for input(s): MAG in the last 72 hours. Phos: No results found for: PHOS  No components found for: GLU    LIVER PROFILE:   Recent Labs     03/18/20  0020   AST 25   ALT 34   BILITOT <0.2   ALKPHOS 58     PT/INR:   Recent Labs     03/18/20 0020   PROTIME 10.8   INR 0.93     APTT: No results for input(s): APTT in the last 72 hours. UA:  Recent Labs     03/18/20  0021   COLORU YELLOW   PHUR 5.0   CLARITYU Clear   SPECGRAV 1.024   LEUKOCYTESUR Negative   UROBILINOGEN 0.2   BILIRUBINUR SMALL*   BLOODU Negative   GLUCOSEU Negative       Invalid input(s): ABG  Lab Results   Component Value Date    CALCIUM 9.7 03/18/2020       Assessment and Plan:    Right hip fracture:   Status post OR today   Ortho are following.   Pain management   PT/OT    Hypertension  Continue with amlodipine, atenolol, and lisinopril      Depression/anxiety  Continue with duloxetine, and Xanax    Type 2 diabetes mellitus:  Sliding scale insulin while he is on the hospital    Hx of stroke  Continue with statin   Continue with aspirin     Leukocytosis  Most likely secondary to hip fracture  No need of antibiotics for the moment  We will monitor      Code status:  Full code   DVT prophylaxis: Lovenox   Disposition:   Pending clinical improvement     Electronically signed by Thuan Holland MD on 3/18/2020 at 12:45 PM

## 2020-03-18 NOTE — CONSULTS
Orthopaedic Surgery Consult Note      Reason for consult:  Right hip fracture    Requesting physician: NAZANIN Johnston    CHIEF COMPLAINT: Right hip pain / fracture. HISTORY:  Ms. Greg Bloom is a 69 y.o. female, who presented to [facility] ER after fall. She had a stroke 3-4 months ago and has some mild residual right leg weakness. The patient reports that this injury occurred when was getting up to go to the kitchen, and was dizzy. She was first seen and evaluated in the ER and found to have right hip fracture. Orthopaedics was consulted. The patient denies any other injuries.        Past Medical History:   Diagnosis Date    Arthritis     Back pain     Cancer ([address]     breast, Left Lumpectomy and radiation      Diabetes     High blood pressure     Hyperlipidemia     Seizure (HCC)     10 yrs ago     [facility]     Type II or unspecified type diabetes mellitus without mention of complication, not stated as uncontrolled        Past Surgical History:   Procedure Laterality Date    BACK SURGERY      BREAST LUMPECTOMY Left     CARPAL TUNNEL RELEASE      left    COLONOSCOPY      HYSTERECTOMY      INTRACAPSULAR CATARACT EXTRACTION Right 3/11/2019    PHACOEMULSIFICATION WITH INTRAOCULAR LENS IMPLANT  performed by Anai Escobar MD at [place] [address] Left 3/25/2019    PHACOEMULSIFICATION WITH INTRAOCULAR LENS IMPLANT  performed by Anai Escobar MD at [address] [place] [address] Left     TONSILLECTOMY         Current Facility-Administered Medications   Medication Dose Route Frequency Provider Last Rate Last Dose    QUEtiapine (SEROQUEL) tablet 25 mg  25 mg Oral Nightly PRN Ganesh Dickinson MD        lisinopril (PRINIVIL;ZESTRIL) tablet 20 mg  20 mg Oral Daily Ganesh Dickinson MD        DULoxetine (CYMBALTA) extended release capsule 60 mg  60 mg Oral Daily Ganesh Woodall MD        pantoprazole (PROTONIX) tablet 40 mg  40 mg Oral Daily PRN Beau Ward MD        atenolol (TENORMIN) tablet 50 mg  50 mg Oral Daily Ganesh Woodall MD        aspirin EC tablet 325 mg  325 mg Oral Daily Ganesh Woodall MD        amLODIPine (NORVASC) tablet 10 mg  10 mg Oral Daily Ganesh Woodall MD        AL[facility]AZolam [facility]) tablet 0.5 mg  0.5 mg Oral BID PRN Beau Ward MD   0.5 mg at 03/18/20 0202    sodium chloride flush 0.9 % injection 10 mL  10 mL Intravenous 2 times per day Beau Ward MD        sodium chloride flush 0.9 % injection 10 mL  10 mL Intravenous PRN Beau Ward MD        acetaminophen (TYLENOL) tablet 650 mg  650 mg Oral Q6H PRN Ganesh Woodall MD        Or    acetaminophen (TYLENOL) suppository 650 mg  650 mg Rectal Q6H PRN Ganesh Woodall MD        ondansetron (ZOFRAN) injection 4 mg  4 mg Intravenous Q6H PRN Ganesh Woodall MD        atorvastatin (LIPITOR) tablet 10 mg  10 mg Oral Nightly Margret Mcclure PA-C        docusate sodium (COLACE) capsule 100 mg  100 mg Oral Daily Ganesh Woodall MD        diazePAM (VALIUM) tablet 5 mg  5 mg Oral Q6H PRN Ganesh Woodall MD   5 mg at 03/18/20 0500    HYDROcodone-acetaminophen (NORCO) 5-325 MG per tablet 1 tablet  1 tablet Oral Q4H PRN Beau Ward MD   1 tablet at 03/18/20 0941    HYDROmorphone (DILAUDID) injection 0.7 mg  0.7 mg Intravenous Q4H PRN Ganesh Woodall MD        glucose (GLUTOSE) 40 % oral gel 15 g  15 g Oral PRN Ganesh Woodall MD        dextrose 50 % IV solution  12.5 g Intravenous PRN Ganesh Woodall MD        glucagon (rDNA) injection 1 mg  1 mg Intramuscular PRN Ganesh Woodall MD        dextrose 5 % solution  100 mL/hr Intravenous PRN Ganesh Woodall MD        insulin lispro (HUMALOG) injection vial 0-6 Units  0-6 Units Subcutaneous Q4H Beau Ward MD       [facility]

## 2020-03-18 NOTE — PROGRESS NOTES
Occupational Therapy   Occupational Therapy Initial Assessment  Date: 3/18/2020   Patient Name: Serena Kothari  MRN: [id number]     : 1945    Date of Service: 3/18/2020    Assessment: Pt is 69 y.o. F who presents s/p mechanical fall resulting in R intertrochanteric hip fracture. Pt is s/p R IM nailing and is WBAT. PTA pt lives in [facility] with daughter, able to live on main floor with 1 MONAE. Pt reports independence in self-care tasks, homemaking responsibilities, and functional mobility recently with no device. Currently, pt presents with ROM/strength in LUE WFL, RUE with some decreased strength/accuracy/speed/coordination d/t previous CVA however was able to use on stedy for transfers. Pt completed bed mobility with max A x2 and sit <> stand from elevated EOB to elsa stedy. Plan to attempt functional mobility with RW as pain improves. Anticipate pt to require mod/max A for ADL needs at this time. Pt is unsafe to return home at this time and would benefit from continued therapy to increase mobility, safety, and independence. Anticipate pt to tolerate high frequency therapy. Discharge Recommendations:  5-7 sessions per week     Serena Kothari scored a 16/24 on the AM-PAC ADL Inpatient form. Current research shows that an AM-PAC score of 17 or less is typically not associated with a discharge to the patient's home setting. Based on the patients AM-PAC score and their current ADL deficits, it is recommended that the patient have 5-7 sessions per week of Occupational Therapy at d/c to increase the patients independence. Assessment   Performance deficits / Impairments: Decreased functional mobility ; Decreased strength;Decreased endurance;Decreased ADL status; Decreased balance  Assessment: Pt is 69 y.o. F who presents s/p mechanical fall resulting in R intertrochanteric hip fracture. Pt is s/p R IM nailing and is WBAT. PTA pt lives in [facility] with daughter, able to live on main floor with 1 MONAE.  Pt reports independence in self-care tasks, homemaking responsibilities, and functional mobility recently with no device. Currently, pt presents with ROM/strength in LUE WFL, RUE with some decreased strength/accuracy/speed/coordination d/t previous CVA however was able to use on stedy for transfers. Pt completed bed mobility with max A x2 and sit <> stand from elevated EOB to elsa stedy. Plan to attempt functional mobility with RW as pain improves. Anticipate pt to require mod/max A for ADL needs at this time. Pt is unsafe to return home at this time and would benefit from continued therapy to increase mobility, safety, and independence. Anticipate pt to tolerate high frequency therapy. Prognosis: Fair;Good  Decision Making: Medium Complexity  History: PMH: CVA resulting in R side weakness in Dec 2019, DM, Ca, Seizures  Exam: ADLs, transfers, bed mob  Assistance / Modification: Mod Ax2 in stedy, max A for ADLs   OT Education: OT Role;Precautions;Plan of Care;ADL Adaptive Strategies;Transfer Training  REQUIRES OT FOLLOW UP: Yes  Activity Tolerance  Activity Tolerance: Patient limited by pain  Safety Devices  Safety Devices in place: Yes(LYNNE Burger) aware)  Type of devices: Left in chair;Call light within reach; Chair alarm in place;Gait belt;Nurse notified           Patient Diagnosis(es): The encounter diagnosis was Closed displaced intertrochanteric fracture of right femur, initial encounter ([address] has a past medical history of Arthritis, Back pain, Cancer ([address] Diabetes, High blood pressure, Hyperlipidemia, Seizure ([address] SVT (supraventricular tachycardia) ([address] and Type II or unspecified type diabetes mellitus without mention of complication, not stated as uncontrolled. has a past surgical history that includes back surgery; Carpal tunnel release; Hysterectomy; Tonsillectomy; lymph node biopsy (Left); Colonoscopy; Intracapsular cataract extraction (Right, 3/11/2019); Breast lumpectomy (Left);  Intracapsular cataract walker, Cane  ADL Assistance: Independent  Homemaking Assistance: Independent  Ambulation Assistance: Independent  Transfer Assistance: Independent  Active : Yes  Mode of Transportation: Car  Occupation: Retired  Additional Comments: Denies hx of additional falls       Objective   Vision: Impaired  Vision Exceptions: Wears glasses for reading  Hearing: Within functional limits      Orientation  Overall Orientation Status: Within Functional Limits     Balance  Sitting Balance: Stand by assistance  Standing Balance: Minimal assistance(in stedy )  Standing Balance  Time: ~10 seconds x2   Activity: Transfers    Functional Mobility  Functional Mobility Comments: Use of elsa stedy at this time d/t pain     ADL  Toileting: (Catheter in place )  Additional Comments: PTA pt reports independence in self-care tasks. Anticipate pt to require mod/max A for LB ADLs, SBA for UB ADLs, min/mod A for toileting at this time. Tone RUE  RUE Tone: Normotonic  Tone LUE  LUE Tone: Normotonic  Coordination  Movements Are Fluid And Coordinated: No  Quality of Movement Other  Comment: R hand with some decreased coordination, strength and accuracy d/t CVA in Dec 2019      Bed mobility  Supine to Sit: Maximum assistance;2 Person assistance  Sit to Supine: Unable to assess(Up in chair at end of session)     Transfers  Sit to stand: Moderate assistance;2 Person assistance  Stand to sit: Moderate assistance;2 Person assistance  Transfer Comments:  Mod Ax2 for sit <> stand from slightly elevated EOB to elsa stedy and sit to recliner chair      Cognition  Overall Cognitive Status: WFL        Sensation  Overall Sensation Status: WFL        LUE AROM (degrees)  LUE AROM : WFL  Left Hand AROM (degrees)  Left Hand AROM: WFL  RUE AROM (degrees)  RUE AROM : WFL  Right Hand AROM (degrees)  Right Hand AROM: WFL     LUE Strength  Gross LUE Strength: WFL  RUE Strength  Gross RUE Strength: WFL          Plan   Plan  Times per week: 3-5  Current

## 2020-03-18 NOTE — ED PROVIDER NOTES
[address] [facility]      Pt Name: Jh Swartz  MRN: [id number]  [place] [birth year]  Date of evaluation: 3/17/2020  [facility]  Chief Complaint   Patient presents with    Fall     right hip and thigh pain       I have fully participated in the care of Jh Swartz and have had a face-to-face evaluation. I have reviewed and agree with all pertinent clinical information, and midlevel provider's history, and physical exam. I have also reviewed the labs, EKG, and imaging studies and treatment plan. I have also reviewed and agree with the medications, allergies and past medical history section for this Jh Swartz. I agree with the diagnosis, and I concur. Past Medical History:   Diagnosis Date    Arthritis     Back pain     [facility]     breast, Left Lumpectomy and radiation      Diabetes     High blood pressure     Hyperlipidemia     Seizure (HCC)     10 yrs ago     SVT (supraventricular tachycardia) (HCC)     Type II or unspecified type diabetes mellitus without mention of complication, not stated as uncontrolled        MDM:  Jh Swartz is a 69 y.o. female who presents with fall from a standing position. She is complaining of right hip pain. She denies any other injuries. She denies any lacerations or bleeding. Physical exam reveals tenderness of her right hip. Remainder of her exam is noncontributory. Therefore, x-ray of her hip revealed an intertrochanteric fracture of the right hip. Orthopedics was notified and hospitalist was notified. Patient be admitted to hospital for further evaluation and treatment. EKG as noted below.     Vitals:    03/17/20 2345   BP: 125/76   Pulse: 65   Resp: 16   Temp: 97.5 °F (36.4 °C)   SpO2: 97%       Lab results  Labs Reviewed   CBC WITH AUTO DIFFERENTIAL - Abnormal; Notable for the following components:       Result Value    WBC 14.9 (*) Neutrophils Absolute 11.7 (*)     All other components within normal limits    Narrative:     Performed at:  [facility]  [address] [place] [address]   Phone [phone]   URINE RT REFLEX TO CULTURE - Abnormal; Notable for the following components:    Bilirubin Urine SMALL (*)     Ketones, Urine TRACE (*)     All other components within normal limits    Narrative:     Performed at:  [facility]  [address] [address]   Phone [phone]   [place] W/ REFLEX TO MG FOR LOW K   TROPONIN   TYPE AND SCREEN       EKG Results  EKG Interpretation    Interpreted by emergency department physician  Time read: 0036    Rhythm: Sinus  Ventricular Rate: 62  QRS Axis: 4  Ectopy: None  Conduction: Normal sinus rhythm with age-indeterminate septal infarct that is unchanged from her previous EKG on 12/3/2019  ST Segments: normal  T Waves: Diffusely flattened  Q Waves: V1 V2    Other findings: None    Compared to EKG on: 12/3/2019    Clinical Impression: Normal sinus rhythm with age-indeterminate septal infarct that does not appear acute with diffusely flattened T waves and Q waves in V1 and V2 suggestive of remote septal infarct. This is unchanged from previous EKG on 12/3/2019. [address]      Radiology results  CT PELVIS WO CONTRAST Additional Contrast? None   Final Result   Right comminuted intertrochanteric hip fracture. CT HEAD WO CONTRAST   Final Result   No acute intracranial abnormality. XR CHEST PORTABLE   Final Result   No evidence of acute cardiopulmonary disease         XR HIP 2-3 VW W PELVIS RIGHT   Preliminary Result   1. Acute mildly angulated right proximal femur intertrochanteric fracture. Normal hip alignment. 2. Right mid femoral diaphyseal sclerotic changes which may represent a   remote healed fracture. No acute diaphyseal fracture.

## 2020-03-18 NOTE — CARE COORDINATION
Referral to [address] [place] [address] received. Patient appears appropriate for program but will require an insurance pre-cert. Pre-cert started with [facility]. Will wait for them to call with fax number to send clinicals.

## 2020-03-18 NOTE — OP NOTE
DATE OF SURGERY:  3/18/20    PREOPERATIVE DIAGNOSIS: Right intertrochanteric hip fracture. POSTOPERATIVE DIAGNOSIS: Right intertrochanteric hip fracture. PROCEDURE: Right hip intramedullary nailing. ASSISTANT: Aury Hsieh    ANESTHESIA: General.    ESTIMATED BLOOD LOSS: 75 mL. COMPLICATIONS: None. DISPOSITION: To PACU in good condition. INDICATIONS FOR PROCEDURE: The patient is a 69 y.o. female  who sustained a fall onto the right hip. The patient was brought to the [facility] AT [facility] and was discovered to have a right intertrochanteric hip fracture. The patient was subsequently admitted to the hospital for definitive treatment. An orthopedic consultation was obtained. I recommended operative fixation of the hip. The patient and /or family was explained the risks, benefits,  complications, alternatives of the procedure and they subsequently provided  written informed consent for the procedure. DESCRIPTION OF PROCEDURE: The patient was seen in the preoperative holding  area. The right hip was marked. The patient was seen by the Anesthesia service. The patient was then brought to the operating room. The patient was induced under general anesthesia on the bed. The patient was given  prophylactic preoperative IV antibiotics. The patient was then transferred onto the fracture table in the supine position. Care was taken to ensure that all  bony prominences were well padded. The nonoperative leg was placed in the well leg  esposito. The operative leg was placed in a traction boot. We then performed a closed  reduction on the fracture table with fluoroscopy. Once we had anatomic  reduction, we then sterilely prepped and draped the operative hip in the usual  fashion. A time-out was taken where the patient, the operative extremity and  the operative procedure were once again verified.  We then made an  approximately 3 cm incision just superior to the tip of the greater  trochanter along the lateral aspect of the hip. Sharp dissection was carried  down through the skin. Blunt dissection was carried down to the tip of the  greater trochanter. We then inserted the curved awl. The awl was placed at  the tip of the greater trochanter and position was verified via fluoroscopy. The awl was then inserted into the intramedullary canal. The ball-tip  guidewire was then passed down into the intramedullary canal to the level of  the superior pole of the patella. We then removed the awl. We then measured  the length of our wire using the measuring device. This measured for a 360 mm  nail. We then inserted the opening reamer. We then inserted a Mobilinga 10 x 125 degree x 360mm gamma nail. The nail was inserted until adequate positioning was verified via fluoroscopy. We then inserted the drill sleeve along the lateral aspect of the thigh. An approximately 1.5 cm incision was made. Blunt dissection was carried down to the lateral aspect of the femur. The drill sleeve was then placed along the lateral aspect of the femur. The guide  pin was then placed through the femoral neck into the head after confirming  position on fluoroscopy. We then used a step drill to drill for a lag screw. We measured for a 100mm lag screw. We then inserted the lag screw until it  was fully seated and near the subchondral bone. We then inserted the set screw. The lag screw was not allowed to slide. We then removed the lag screw insertion handle. We then attached the distal aiming guide onto the insertion handle. Fluoroscopy was used to verify position of the drill sleeve. A small incision was made along the lateral distal femur. We then drilled through the femur and measured the length for our distal locking screw. A 40mm screw was inserted. We then took final fluoroscopy pictures. The wounds were then copiously irrigated with normal saline solution.  The subcutaneous tissue was closed with 2-0 Vicryl suture in a simple inverted interrupted fashion. The skin was then closed with staples. Xeroform gauze was then applied. Marcaine 0.5% was injected for local anesthesia. A 4 x 4 gauze, ABD pads and tape were then applied. The patient was then awakened from general anesthesia and transferred onto  her hospital bed and transported to the PACU for recovery. The patient  tolerated the procedure well and without any complications. PLANS: The patient will be recovered in the PACU and then be readmitted to  the floor. The patient is Weight bearing as tolerated on the operative leg. The patient will have PT and OT consult. The patient will have 24 hours of prophylactic IV antibiotics. The patient will have DVT prophylaxis.         Electronically signed by Rhea Kaplan MD on 3/18/2020 at 10:05 AM

## 2020-03-18 NOTE — ANESTHESIA PRE PROCEDURE
Silvina Sanchez MD        glucagon (rDNA) injection 1 mg  1 mg Intramuscular PRN Silvina Sanchez MD        dextrose 5 % solution  100 mL/hr Intravenous PRN Ganesh Delaney MD        insulin lispro (HUMALOG) injection vial 0-6 Units  0-6 Units Subcutaneous Q4H Ganesh Delaney MD        ceFAZolin (ANCEF) 2 g in dextrose 5 % 100 mL IVPB  2 g Intravenous On Call to Gin Guzman MD           Allergies: Allergies   Allergen Reactions    Lexapro [Escitalopram Oxalate]     Wellbutrin [Bupropion]        Problem List:    Patient Active Problem List   Diagnosis Code    Difficulty with family Z62.9    Other and unspecified hyperlipidemia E78.5    Convulsions ([facility] [address] R56.9    Pain in joint, forearm M25.539    Hypercalcemia E83.52    Malignant neoplasm of other specified sites of female breast C50.919    Depressive disorder, not elsewhere classified F32.9    Symptomatic menopausal or female climacteric states N95.1    Degeneration of lumbar or lumbosacral intervertebral disc M51.37    Esophageal reflux K21.9    Essential hypertension, benign I10    Type II or unspecified type diabetes mellitus without mention of complication, not stated as uncontrolled E11.9    Nonspecific elevation of levels of transaminase or lactic acid dehydrogenase (LDH) R74.0    Received intravenous tissue plasminogen activator (tPA) in emergency department Z92.82    Acute bilateral cerebral infarction in a watershed distribution [facility]) I63.89    Bone fracture T14. 8XXA       Past Medical History:        Diagnosis Date    Arthritis     Back pain     Cancer ([facility] [address]     breast, Left Lumpectomy and radiation      Diabetes     High blood pressure     Hyperlipidemia     Seizure ([address]     10 yrs ago     SVT (supraventricular tachycardia) (HCC)     Type II or unspecified type diabetes mellitus without mention of complication, not stated as uncontrolled        Past Surgical History:        Procedure Laterality Date AST 25 03/18/2020    ALT 34 03/18/2020       POC Tests:   Recent Labs     03/18/20  0601   POCGLU 133*       Coags:   Lab Results   Component Value Date    PROTIME 10.8 03/18/2020    INR 0.93 03/18/2020       HCG (If Applicable): No results found for: PREGTESTUR, PREGSERUM, HCG, HCGQUANT     ABGs: No results found for: PHART, PO2ART, EZU5WEC, RYB2TIF, BEART, D3TXYTVG     Type & Screen (If Applicable):  No results found for: LABABO, [address]    Anesthesia Evaluation  Patient summary reviewed no history of anesthetic complications:   Airway: Mallampati: II  TM distance: <3 FB   Neck ROM: limited  Mouth opening: > = 3 FB Dental: normal exam         Pulmonary:Negative Pulmonary ROS breath sounds clear to auscultation      (-) COPD, asthma, sleep apnea and not a current smoker          Patient did not smoke on day of surgery. Cardiovascular:  Exercise tolerance: poor (<4 METS),   (+) hypertension:, dysrhythmias: SVT, hyperlipidemia    (-) pacemaker, past MI and CABG/stent    ECG reviewed  Rhythm: regular  Rate: normal           Beta Blocker:  Dose within 24 Hrs         Neuro/Psych:   (+) seizures (10 yrs ago,NO MEDS):, CVA: residual symptoms, depression/anxiety  (hx dep.)            GI/Hepatic/Renal:   (+) GERD:,      (-) liver disease and no renal disease       Endo/Other:    (+) DiabetesType II DM, , malignancy/cancer (breast L). (-) hypothyroidism, hyperthyroidism               Abdominal:   (+) obese,     Abdomen: soft. Vascular: negative vascular ROS. Anesthesia Plan      general     ASA 3     (TOP.)  Induction: intravenous. MIPS: Postoperative opioids intended and Prophylactic antiemetics administered. Anesthetic plan and risks discussed with patient. Plan discussed with CRNA.                 This pre-anesthesia assessment may be used as a history and physical.    DOS STAFF ADDENDUM:    Pt seen and examined, chart reviewed (including anesthesia, drug and allergy history). No interval changes to history and physical examination. Anesthetic plan, risks, benefits, alternatives, and personnel involved discussed with patient. Patient verbalized an understanding and agrees to proceed.       Parveen Maldonado MD  March 18, 2020  8:26 AM    Parveen Maldonado MD   3/18/2020

## 2020-03-18 NOTE — ED PROVIDER NOTES
Affect: Mood normal.         MEDICAL DECISION MAKING    Vitals:    Vitals:    03/17/20 2345   BP: 125/76   Pulse: 65   Resp: 16   Temp: 97.5 °F (36.4 °C)   TempSrc: Oral   SpO2: 97%   Weight: 185 lb 10 oz (84.2 kg)   Height: 5' 6\" (1.676 m)       LABS:  Labs Reviewed   CBC WITH AUTO DIFFERENTIAL - Abnormal; Notable for the following components:       Result Value    WBC 14.9 (*)     Neutrophils Absolute 11.7 (*)     All other components within normal limits    Narrative:     Performed at:  [address] [address]   Phone [phone]   URINE RT REFLEX TO CULTURE - Abnormal; Notable for the following components:    Bilirubin Urine SMALL (*)     Ketones, Urine TRACE (*)     All other components within normal limits    Narrative:     Performed at:  [address] [address]   Phone [phone]   PROTIME-INR   COMPREHENSIVE METABOLIC PANEL W/ REFLEX TO MG FOR LOW K   TROPONIN   TYPE AND SCREEN        Remainder of labs reviewed and werenegative at this time or not returned at the time of this note. RADIOLOGY:   Non-plain film images such as CT, Ultrasound and MRI are read by the radiologist. EDDA Marroquin CNP have directly visualized the radiologic plain film image(s) with the below findings:        Interpretation per the Radiologist below, if available at the time of this note:    CT PELVIS WO CONTRAST Additional Contrast? None   Final Result   Right comminuted intertrochanteric hip fracture. CT HEAD WO CONTRAST   Final Result   No acute intracranial abnormality. XR CHEST PORTABLE   Final Result   No evidence of acute cardiopulmonary disease         XR HIP 2-3 VW W PELVIS RIGHT   Preliminary Result   1. Acute mildly angulated right proximal femur intertrochanteric fracture. Normal hip alignment.    2. Right mid femoral diaphyseal sclerotic changes which may encounter ([facility]        DISPOSITION        PATIENT REFERRED TO:  No follow-up provider specified.     DISCHARGE MEDICATIONS:  New Prescriptions    No medications on file       DISCONTINUED MEDICATIONS:  Discontinued Medications    No medications on file              (Please note the MDM and HPI sections of this note were completed with a voice recognition program.  Efforts were made to edit the dictations but occasionally words are mis-transcribed.)    Electronically signed, EDDA Ramires CNP,          EDDA Ramires CNP  03/18/20 0041

## 2020-03-18 NOTE — PROGRESS NOTES
Yes  Activity Tolerance  Activity Tolerance: Patient limited by pain; Patient limited by fatigue       Patient Diagnosis(es): The encounter diagnosis was Closed displaced intertrochanteric fracture of right femur, initial encounter ([facility] [address] has a past medical history of Arthritis, Back pain, Cancer ([facility] [address] Diabetes, High blood pressure, Hyperlipidemia, Seizure ([address] SVT (supraventricular tachycardia) ([facility] [address] and Type II or unspecified type diabetes mellitus without mention of complication, not stated as uncontrolled. has a past surgical history that includes back surgery; Carpal tunnel release; Hysterectomy; Tonsillectomy; lymph node biopsy (Left); Colonoscopy; Intracapsular cataract extraction (Right, 3/11/2019); Breast lumpectomy (Left); Intracapsular cataract extraction (Left, 3/25/2019); and Hip fracture surgery (Right, 3/18/2020). Restrictions  Restrictions/Precautions  Restrictions/Precautions: Weight Bearing, Fall Risk  Lower Extremity Weight Bearing Restrictions  Right Lower Extremity Weight Bearing: Weight Bearing As Tolerated  Position Activity Restriction  Other position/activity restrictions: Chronic R-sided weakness s/p CVA     Vision/Hearing  Vision: Impaired  Vision Exceptions: Wears glasses for reading  Hearing: Within functional limits       Subjective  General  Chart Reviewed: Yes  Patient assessed for rehabilitation services?: Yes  Additional Pertinent Hx: Per Dr. Dawood Polanco, \"The patient is a 69 y.o. female  who sustained a fall onto the right hip. The patient was brought to the [facility] AT [facility] and was discovered to have a right intertrochanteric hip fracture. The patient was subsequently admitted to the hospital for definitive treatment. An orthopedic consultation was obtained. I recommended operative fixation of the hip. \"  Response To Previous Treatment: Not applicable  Referring Practitioner: Dr. Dawood Polanco  Referral Date : 03/18/20  Diagnosis: R hip fx; Gamma nail  Follows Commands: Within

## 2020-03-18 NOTE — PROGRESS NOTES
4 Eyes Skin Assessment     The patient is being assess for  Admission    I agree that 2 RN's have performed a thorough Head to Toe Skin Assessment on the patient. ALL assessment sites listed below have been assessed. Areas assessed by both nurses:   [x]   Head, Face, and Ears   [x]   Shoulders, Back, and Chest  [x]   Arms, Elbows, and Hands   [x]   Coccyx, Sacrum, and IschIum  [x]   Legs, Feet, and Heels        Does the Patient have Skin Breakdown?   No         Александр Prevention initiated:  Yes   Wound Care Orders initiated:  No      [facility] nurse consulted for Pressure Injury (Stage 3,4, Unstageable, DTI, NWPT, and Complex wounds), New and Established Ostomies:  No      Nurse 1 eSignature: Electronically signed by Jyotsna Vang RN on 3/18/20 at 3:55 AM EDT    **SHARE this note so that the co-signing nurse is able to place an eSignature**    Nurse 2 eSignature: Electronically signed by Lourdes Fairchild RN on 3/18/20 at 5:02 AM EDT

## 2020-03-18 NOTE — PROGRESS NOTES
Pt appears to rest/sleep, report called to floor, phone and phone  left by daughter for pt to have in room, sending with TELMA ruvalcaba rn updated phone coming with pt

## 2020-03-18 NOTE — ED NOTES
Bed: B-07  Expected date:   Expected time:   Means of arrival: [facility]  Comments:  74yF/fall     Bianka Matamoros RN  03/17/20 4027

## 2020-03-18 NOTE — PLAN OF CARE
Problem: Falls - Risk of:  Goal: Will remain free from falls  Description: Will remain free from falls  Outcome: Met This Shift  Note: Patient educated on fall prevention. Call light is within reach, bed locked in lowest position, personal items within reach, and bed alarm is on. Will round on patient per unit guidelines. Goal: Absence of physical injury  Description: Absence of physical injury  Outcome: Met This Shift  Note: Pt is free of injury. No injury noted. Fall precautions in place. Call light within reach. Will monitor. Problem: Pain:  Goal: Pain level will decrease  Description: Pain level will decrease  Outcome: Ongoing  Note: Pain /discomfort being managed with PRN analgesics per MD orders, ice, elevation. Somewhat effective but ongoing. Patient able to express presence and absence of pain and rate pain appropriately using numerical scale. Goal: Control of acute pain  Description: Control of acute pain  Outcome: Ongoing  Note: Pain /discomfort being managed with PRN analgesics per MD orders, ice, elevation. Somewhat effective but ongoing. Patient able to express presence and absence of pain and rate pain appropriately using numerical scale. Goal: Control of chronic pain  Description: Control of chronic pain  Outcome: Met This Shift  Note: No C/O of chronic pain per this shift.

## 2020-03-18 NOTE — PROGRESS NOTES
Patient's son, Srinivasan Shirley ([phone]), alerted that patient is going to surgery at this time. Srinivasan Shirley aware that no visitors are allowed on the floor at this time, but he is able to call for updates at any time. Will provide updates to Srinivasan Shirley should patient condition change.

## 2020-03-19 LAB
ANION GAP SERPL CALCULATED.3IONS-SCNC: 14 MMOL/L (ref 3–16)
BASOPHILS ABSOLUTE: 0 K/UL (ref 0–0.2)
BASOPHILS RELATIVE PERCENT: 0.5 %
BUN BLDV-MCNC: 13 MG/DL (ref 7–20)
CALCIUM SERPL-MCNC: 8.8 MG/DL (ref 8.3–10.6)
CHLORIDE BLD-SCNC: 97 MMOL/L (ref 99–110)
CO2: 23 MMOL/L (ref 21–32)
CREAT SERPL-MCNC: 0.7 MG/DL (ref 0.6–1.2)
EOSINOPHILS ABSOLUTE: 0.5 K/UL (ref 0–0.6)
EOSINOPHILS RELATIVE PERCENT: 5.1 %
GFR AFRICAN AMERICAN: >60
GFR NON-AFRICAN AMERICAN: >60
GLUCOSE BLD-MCNC: 168 MG/DL (ref 70–99)
GLUCOSE BLD-MCNC: 169 MG/DL (ref 70–99)
GLUCOSE BLD-MCNC: 173 MG/DL (ref 70–99)
GLUCOSE BLD-MCNC: 191 MG/DL (ref 70–99)
GLUCOSE BLD-MCNC: 193 MG/DL (ref 70–99)
HCT VFR BLD CALC: 34.2 % (ref 36–48)
HEMOGLOBIN: 11.6 G/DL (ref 12–16)
LYMPHOCYTES ABSOLUTE: 1.5 K/UL (ref 1–5.1)
LYMPHOCYTES RELATIVE PERCENT: 17 %
MCH RBC QN AUTO: 31.6 PG (ref 26–34)
MCHC RBC AUTO-ENTMCNC: 33.9 G/DL (ref 31–36)
MCV RBC AUTO: 93.3 FL (ref 80–100)
MONOCYTES ABSOLUTE: 0.7 K/UL (ref 0–1.3)
MONOCYTES RELATIVE PERCENT: 7.9 %
NEUTROPHILS ABSOLUTE: 6.2 K/UL (ref 1.7–7.7)
NEUTROPHILS RELATIVE PERCENT: 69.5 %
PDW BLD-RTO: 13.7 % (ref 12.4–15.4)
PERFORMED ON: ABNORMAL
PLATELET # BLD: 211 K/UL (ref 135–450)
PMV BLD AUTO: 8 FL (ref 5–10.5)
POTASSIUM REFLEX MAGNESIUM: 3.9 MMOL/L (ref 3.5–5.1)
POTASSIUM SERPL-SCNC: 3.9 MMOL/L (ref 3.5–5.1)
RBC # BLD: 3.67 M/UL (ref 4–5.2)
SODIUM BLD-SCNC: 134 MMOL/L (ref 136–145)
WBC # BLD: 8.9 K/UL (ref 4–11)

## 2020-03-19 PROCEDURE — 2580000003 HC RX 258: Performed by: ORTHOPAEDIC SURGERY

## 2020-03-19 PROCEDURE — 6370000000 HC RX 637 (ALT 250 FOR IP): Performed by: NURSE PRACTITIONER

## 2020-03-19 PROCEDURE — 94760 N-INVAS EAR/PLS OXIMETRY 1: CPT

## 2020-03-19 PROCEDURE — 97530 THERAPEUTIC ACTIVITIES: CPT

## 2020-03-19 PROCEDURE — 6370000000 HC RX 637 (ALT 250 FOR IP): Performed by: HOSPITALIST

## 2020-03-19 PROCEDURE — 97535 SELF CARE MNGMENT TRAINING: CPT

## 2020-03-19 PROCEDURE — 97116 GAIT TRAINING THERAPY: CPT

## 2020-03-19 PROCEDURE — 80048 BASIC METABOLIC PNL TOTAL CA: CPT

## 2020-03-19 PROCEDURE — 6370000000 HC RX 637 (ALT 250 FOR IP): Performed by: ORTHOPAEDIC SURGERY

## 2020-03-19 PROCEDURE — 85025 COMPLETE CBC W/AUTO DIFF WBC: CPT

## 2020-03-19 PROCEDURE — 1200000000 HC SEMI PRIVATE

## 2020-03-19 PROCEDURE — 36415 COLL VENOUS BLD VENIPUNCTURE: CPT

## 2020-03-19 PROCEDURE — 6360000002 HC RX W HCPCS: Performed by: ORTHOPAEDIC SURGERY

## 2020-03-19 RX ORDER — ALPRAZOLAM 0.5 MG/1
0.5 TABLET ORAL 2 TIMES DAILY PRN
Qty: 6 TABLET | Refills: 0 | Status: SHIPPED | OUTPATIENT
Start: 2020-03-19 | End: 2020-03-22

## 2020-03-19 RX ORDER — OXYCODONE HYDROCHLORIDE 5 MG/1
5-10 TABLET ORAL EVERY 4 HOURS PRN
Qty: 30 TABLET | Refills: 0 | Status: SHIPPED | OUTPATIENT
Start: 2020-03-19 | End: 2020-03-26

## 2020-03-19 RX ORDER — CYCLOBENZAPRINE HCL 10 MG
5 TABLET ORAL 2 TIMES DAILY PRN
Status: DISCONTINUED | OUTPATIENT
Start: 2020-03-19 | End: 2020-03-21 | Stop reason: HOSPADM

## 2020-03-19 RX ADMIN — INSULIN LISPRO 1 UNITS: 100 INJECTION, SOLUTION INTRAVENOUS; SUBCUTANEOUS at 08:28

## 2020-03-19 RX ADMIN — OXYCODONE HYDROCHLORIDE 10 MG: 10 TABLET ORAL at 08:28

## 2020-03-19 RX ADMIN — SENNOSIDES AND DOCUSATE SODIUM 1 TABLET: 8.6; 5 TABLET ORAL at 08:28

## 2020-03-19 RX ADMIN — OXYCODONE HYDROCHLORIDE 10 MG: 10 TABLET ORAL at 21:51

## 2020-03-19 RX ADMIN — INSULIN LISPRO 1 UNITS: 100 INJECTION, SOLUTION INTRAVENOUS; SUBCUTANEOUS at 16:21

## 2020-03-19 RX ADMIN — INSULIN LISPRO 1 UNITS: 100 INJECTION, SOLUTION INTRAVENOUS; SUBCUTANEOUS at 11:52

## 2020-03-19 RX ADMIN — SODIUM CHLORIDE, PRESERVATIVE FREE 10 ML: 5 INJECTION INTRAVENOUS at 08:28

## 2020-03-19 RX ADMIN — CYCLOBENZAPRINE HYDROCHLORIDE 5 MG: 10 TABLET, FILM COATED ORAL at 20:28

## 2020-03-19 RX ADMIN — OXYCODONE HYDROCHLORIDE 10 MG: 10 TABLET ORAL at 12:32

## 2020-03-19 RX ADMIN — OXYCODONE HYDROCHLORIDE 10 MG: 10 TABLET ORAL at 16:20

## 2020-03-19 RX ADMIN — HYDROMORPHONE HYDROCHLORIDE 0.5 MG: 1 INJECTION, SOLUTION INTRAMUSCULAR; INTRAVENOUS; SUBCUTANEOUS at 03:40

## 2020-03-19 RX ADMIN — ATORVASTATIN CALCIUM 10 MG: 10 TABLET, FILM COATED ORAL at 20:28

## 2020-03-19 RX ADMIN — INSULIN LISPRO 1 UNITS: 100 INJECTION, SOLUTION INTRAVENOUS; SUBCUTANEOUS at 20:29

## 2020-03-19 RX ADMIN — CEFAZOLIN SODIUM 2 G: 10 INJECTION, POWDER, FOR SOLUTION INTRAVENOUS at 00:47

## 2020-03-19 RX ADMIN — ENOXAPARIN SODIUM 40 MG: 40 INJECTION SUBCUTANEOUS at 08:28

## 2020-03-19 RX ADMIN — OXYCODONE HYDROCHLORIDE 10 MG: 10 TABLET ORAL at 00:47

## 2020-03-19 RX ADMIN — DULOXETINE HYDROCHLORIDE 60 MG: 60 CAPSULE, DELAYED RELEASE ORAL at 08:27

## 2020-03-19 RX ADMIN — SENNOSIDES AND DOCUSATE SODIUM 1 TABLET: 8.6; 5 TABLET ORAL at 20:28

## 2020-03-19 RX ADMIN — SODIUM CHLORIDE, PRESERVATIVE FREE 10 ML: 5 INJECTION INTRAVENOUS at 20:54

## 2020-03-19 RX ADMIN — DOCUSATE SODIUM 100 MG: 100 CAPSULE, LIQUID FILLED ORAL at 08:28

## 2020-03-19 RX ADMIN — ASPIRIN 325 MG: 325 TABLET, DELAYED RELEASE ORAL at 08:28

## 2020-03-19 ASSESSMENT — PAIN DESCRIPTION - PROGRESSION
CLINICAL_PROGRESSION: NOT CHANGED
CLINICAL_PROGRESSION: GRADUALLY IMPROVING
CLINICAL_PROGRESSION: GRADUALLY IMPROVING
CLINICAL_PROGRESSION: NOT CHANGED
CLINICAL_PROGRESSION: NOT CHANGED
CLINICAL_PROGRESSION: GRADUALLY IMPROVING
CLINICAL_PROGRESSION: NOT CHANGED
CLINICAL_PROGRESSION: NOT CHANGED
CLINICAL_PROGRESSION: GRADUALLY IMPROVING
CLINICAL_PROGRESSION: NOT CHANGED

## 2020-03-19 ASSESSMENT — PAIN DESCRIPTION - DESCRIPTORS
DESCRIPTORS: ACHING

## 2020-03-19 ASSESSMENT — PAIN - FUNCTIONAL ASSESSMENT
PAIN_FUNCTIONAL_ASSESSMENT: PREVENTS OR INTERFERES SOME ACTIVE ACTIVITIES AND ADLS

## 2020-03-19 ASSESSMENT — PAIN SCALES - WONG BAKER
WONGBAKER_NUMERICALRESPONSE: 0

## 2020-03-19 ASSESSMENT — PAIN DESCRIPTION - LOCATION
LOCATION: HIP

## 2020-03-19 ASSESSMENT — PAIN DESCRIPTION - ONSET
ONSET: ON-GOING

## 2020-03-19 ASSESSMENT — PAIN DESCRIPTION - FREQUENCY
FREQUENCY: CONTINUOUS

## 2020-03-19 ASSESSMENT — PAIN DESCRIPTION - ORIENTATION
ORIENTATION: RIGHT

## 2020-03-19 ASSESSMENT — PAIN SCALES - GENERAL
PAINLEVEL_OUTOF10: 8
PAINLEVEL_OUTOF10: 0
PAINLEVEL_OUTOF10: 8
PAINLEVEL_OUTOF10: 9
PAINLEVEL_OUTOF10: 10
PAINLEVEL_OUTOF10: 6
PAINLEVEL_OUTOF10: 6
PAINLEVEL_OUTOF10: 9
PAINLEVEL_OUTOF10: 10
PAINLEVEL_OUTOF10: 10
PAINLEVEL_OUTOF10: 9

## 2020-03-19 ASSESSMENT — PAIN DESCRIPTION - PAIN TYPE
TYPE: SURGICAL PAIN

## 2020-03-19 ASSESSMENT — PAIN DESCRIPTION - DIRECTION
RADIATING_TOWARDS: KNEE
RADIATING_TOWARDS: KNEE

## 2020-03-19 NOTE — DISCHARGE INSTR - COC
Independent  Med Delivery   whole    Wound Care Documentation and Therapy:  Keep Mepilex dressing on hip. Can leave wound to air on POD#7 if no drainage  If staples are in place they will need to be removed on or by POD#14. If questions about getting them removed please call Dr Keanu Johnson assistant, Guadalupe Morley,  at office. [phone]        Elimination:  Urinary Catheter: None   Colostomy/Ileostomy: No  Continence:   · Bowel: Yes  · Bladder: Yes  Date of Last BM: 3/18/20    Intake/Output Summary (Last 24 hours)     Intake/Output Summary (Last 24 hours) at 3/19/2020 0751  Last data filed at 3/19/2020 0631  Gross per 24 hour   Intake 1771.25 ml   Output 1175 ml   Net 596.25 ml     Safety Concerns:     History of Falls (last 30 days) and At Risk for Falls    Impairments/Disabilities:      Vision    Nutrition Therapy:  Current Nutrition Therapy: Dietary Nutrition Supplements: Standard High Calorie Oral Supplement  DIET CARB CONTROL;  Routes of Feeding: Oral  Liquids: No Restrictions  Daily Fluid Restriction: no  Last Modified Barium Swallow with Video (Video Swallowing Test): not done    Treatments at the Time of Hospital Discharge:   Respiratory Treatments:   Oxygen Therapy:  is not on home oxygen therapy.   Ventilator:    - No ventilator support    Lab orders for discharge:        Rehab Therapies: Physical Therapy, Occupational Therapy and nursing care  Weight Bearing Status/Restrictions: No weight bearing restirctions  Other Medical Equipment (for information only, NOT a DME order): rolling walker   Other Treatments:     Patient's personal belongings (please select all that are sent with patient):  {[facility] DME Belongings:[id number]}    RN SIGNATURE:  Electronically signed by Ann Gold RN on 3/20/2020 at 6:38 PM      PHYSICIAN SECTION    Prognosis: Good    Condition at Discharge: Stable    Rehab Potential (if transferring to Rehab): Good    Physician Certification: I certify the above orders, information, and transfer of

## 2020-03-19 NOTE — PROGRESS NOTES
Occupational Therapy  Facility/Department: [address] ORTHOPEDICS  Daily Treatment Note  NAME: Judit Harper  :   MRN: [id number]    Date of Service: 3/19/2020    Assessment: Pt tolerated session fair this date - continues to be limited by pain and decreased strength/activity tolerance. Pt completed bed mobility with mod/max Ax2 and sit <> stand transfers with min/mod Ax2 to RW. Pt completed functional mobility with RW ~3 feet with min/mod Ax2 to navigate RW and cue for safety. Pt completed light grooming tasks with setup - anticipate up to max A for LB ADLs. Pt remains unsafe to d/c home at this time and would benefit from continued therapy to increase mobility, safety, and independence. Anticipate pt to tolerate high frequency therapy. Discharge Recommendations:  5-7 sessions per week     Judit Harper scored a 16/24 on the AM-PAC ADL Inpatient form. Current research shows that an AM-PAC score of 17 or less is typically not associated with a discharge to the patient's home setting. Based on the patients AM-PAC score and their current ADL deficits, it is recommended that the patient have 5-7 sessions per week of Occupational Therapy at d/c to increase the patients independence. Assessment   Performance deficits / Impairments: Decreased functional mobility ; Decreased strength;Decreased endurance;Decreased ADL status; Decreased balance  Assessment: Pt tolerated session fair this date - continues to be limited by pain and decreased strength/activity tolerance. Pt completed bed mobility with mod/max Ax2 and sit <> stand transfers with min/mod Ax2 to RW. Pt completed functional mobility with RW ~3 feet with min/mod Ax2 to navigate RW and cue for safety. Pt completed light grooming tasks with setup - anticipate up to max A for LB ADLs. Pt remains unsafe to d/c home at this time and would benefit from continued therapy to increase mobility, safety, and independence.  Anticipate pt to tolerate high frequency therapy. OT Education: OT Role;Precautions;Plan of Care;ADL Adaptive Strategies;Transfer Training  REQUIRES OT FOLLOW UP: Yes  Activity Tolerance  Activity Tolerance: Patient limited by pain  Safety Devices  Safety Devices in place: Yes(RN Taz Flores) aware)  Type of devices: Left in chair;Call light within reach; Chair alarm in place;Gait belt;Nurse notified         Patient Diagnosis(es): The primary encounter diagnosis was Closed displaced intertrochanteric fracture of right femur, initial encounter ([facility] [address] A diagnosis of Anxiety was also pertinent to this visit. has a past medical history of Arthritis, Back pain, Cancer ([facility] [address] Diabetes, High blood pressure, Hyperlipidemia, Seizure ([facility] [address] SVT (supraventricular tachycardia) ([facility] [address] and Type II or unspecified type diabetes mellitus without mention of complication, not stated as uncontrolled. has a past surgical history that includes back surgery; Carpal tunnel release; Hysterectomy; Tonsillectomy; lymph node biopsy (Left); Colonoscopy; Intracapsular cataract extraction (Right, 3/11/2019); Breast lumpectomy (Left); Intracapsular cataract extraction (Left, 3/25/2019); and Hip fracture surgery (Right, 3/18/2020). Restrictions  Restrictions/Precautions  Restrictions/Precautions: Weight Bearing, Fall Risk  Lower Extremity Weight Bearing Restrictions  Right Lower Extremity Weight Bearing: Weight Bearing As Tolerated  Position Activity Restriction  Other position/activity restrictions: Chronic R-sided weakness s/p CVA     Subjective   General  Chart Reviewed: Yes  Patient assessed for rehabilitation services?: Yes  Additional Pertinent Hx: Pt is 69 y.o. F who presents s/p mechanical fall resulting in R intertrochanteric hip fracture. Pt is s/p R IM nailing and is WBAT.  PMH: CVA resulting in R side weakness in Dec 2019, DM, Ca, Seizures  Family / Caregiver Present: No  Referring Practitioner: Janet Herrera MD  Diagnosis: Right intertrochanteric hip fracture sessions per week of Occupational Therapy at d/c to increase the patients independence. If patient discharges prior to next session this note will serve as a discharge summary. Please see below for the latest assessment towards goals. AM-PAC Inpatient Daily Activity Raw Score: 16 (03/19/20 1141)  AM-PAC Inpatient ADL T-Scale Score : 35.96 (03/19/20 1141)  ADL Inpatient CMS 0-100% Score: 53.32 (03/19/20 1141)  ADL Inpatient CMS G-Code Modifier : CK (03/19/20 1141)    Goals  Short term goals  Time Frame for Short term goals: prior to d/c: status of goals ongoing as of 3/19/20   Short term goal 1: Pt will complete sit <> stand transfers with min A  Short term goal 2: Pt will complete functional mobility with RW with min A  Short term goal 3: Pt will complete toileting with min A   Short term goal 4: Pt will tolerate 3+ minutes of standing activity with CGA to increase strength and activity tolerance for self-care and transfers  Patient Goals   Patient goals : \"to get home soon\"       Therapy Time   Individual Concurrent Group Co-treatment   Time In       1110   Time Out       1140   Minutes       30   Timed Code Treatment Minutes: 30 Minutes     If pt is discharged prior to next OT session, this note will serve as the discharge summary.     Marilia Lopez, MARCO/P#[id number]

## 2020-03-19 NOTE — PLAN OF CARE
Problem: Falls - Risk of:  Goal: Will remain free from falls  Description: Will remain free from falls  3/18/2020 2129 by Aldair Connelly RN  Outcome: Met This Shift  Note: Patient educated on fall prevention. Call light is within reach, bed locked in lowest position, personal items within reach, and bed alarm is on. Will round on patient per unit guidelines. 3/18/2020 1107 by Cheryl Kiser RN  Outcome: Ongoing  Note: Patient remains free from falls during this shift. Fall precautions remain in place. Patient educated on the need to implement call light use prior to ambulation. Will continue to monitor and assess. Goal: Absence of physical injury  Description: Absence of physical injury  3/18/2020 2129 by Aldair Connelly RN  Outcome: Met This Shift  Note: Pt is free of injury. No injury noted. Fall precautions in place. Call light within reach. Will monitor. 3/18/2020 1107 by Cheryl Kiser RN  Outcome: Ongoing  Note: Patient remains free from physical harm during this shift. Will continue to monitor and assess patient. Problem: Pain:  Goal: Pain level will decrease  Description: Pain level will decrease  3/18/2020 2129 by Aldair Connelly RN  Outcome: Ongoing  Note: Pain /discomfort being managed with PRN analgesics per MD orders, ice, repositioning. Somewhat effective but ongoing. . Patient able to express presence and absence of pain and rate pain appropriately using numerical scale. 3/18/2020 1107 by Cheryl Kiser RN  Outcome: Ongoing  Note: Pain managed with pharmacologic and non-pharmacologic interventions during this shift. Will continue to monitor and assess for needs and change in patient condition. Goal: Control of acute pain  Description: Control of acute pain  3/18/2020 2129 by Aldair Connelly RN  Outcome: Ongoing  Note: Pain /discomfort being managed with PRN analgesics per MD orders, ice, repositioning. Somewhat effective but ongoing.  . Patient able to express presence and absence of pain and rate pain appropriately using numerical scale. 3/18/2020 1107 by Angella Haile RN  Outcome: Ongoing  Note: Pain managed with pharmacologic and non-pharmacologic interventions during this shift. Will continue to monitor and assess for needs and change in patient condition. Goal: Control of chronic pain  Description: Control of chronic pain  3/18/2020 2129 by Eduarda Blake RN  Outcome: Met This Shift  Note: No C/O of chronic pain per this shift. 3/18/2020 1107 by Angella Haile RN  Outcome: Ongoing  Note: Pain managed with pharmacologic and non-pharmacologic interventions during this shift. Will continue to monitor and assess for needs and change in patient condition.

## 2020-03-19 NOTE — PROGRESS NOTES
Pt doing okay. Oxycodone given for pain. BP has improved and remains stable after holding BP meds. Dressing to R hip changed to mepilex and remains clean, dry, intact. Pt able to void post martinez removal.  Pt tolerating oral diet well, no nausea. IS completed and continued use encouraged. Will monitor.   Electronically signed by Ann Gold RN on 3/19/2020 at 4:46 PM

## 2020-03-19 NOTE — CONSULTS
[facility]  3/19/2020  12:54 PM    Patient Name:   Kimi Evans  YOB: 1945    Diagnosis: Right hip fracture, S/P nailing        Subjective: Rehab consult received. Chart reviewed. Patient seen. Patient discussed with our rehab unit admission nurse. 69 y.o. F who presents s/p mechanical fall resulting in R intertrochanteric hip fracture. Pt is s/p R IM nailing by Dr. Rozina Cloud yesterday, and is WBAT. PMH: CVA resulting in R side weakness in Dec 2019, for which she was treated on our rehabilitation unit, DM, Ca, Seizures. Patient started in therapies, but needs more therapy before discharge to home safely. Pt is stable for DC to Rehab unit today if approved. Patient lives at home with her daughter in a condo.       BP 93/63   Pulse 108   Temp 97.4 °F (36.3 °C) (Oral)   Resp 16   Ht 5' 6\" (1.676 m)   Wt 181 lb (82.1 kg)   SpO2 95%   BMI 29.21 kg/m²     Last 24 hour lab  Recent Results (from the past 24 hour(s))   POCT Glucose    Collection Time: 03/18/20  4:48 PM   Result Value Ref Range    POC Glucose 194 (H) 70 - 99 mg/dl    Performed on ACCU-CHEK    POCT Glucose    Collection Time: 03/18/20  8:40 PM   Result Value Ref Range    POC Glucose 216 (H) 70 - 99 mg/dl    Performed on ACCU-CHEK    Basic Metabolic Panel    Collection Time: 03/19/20  4:21 AM   Result Value Ref Range    Sodium 134 (L) 136 - 145 mmol/L    Potassium 3.9 3.5 - 5.1 mmol/L    Chloride 97 (L) 99 - 110 mmol/L    CO2 23 21 - 32 mmol/L    Anion Gap 14 3 - 16    Glucose 193 (H) 70 - 99 mg/dL    BUN 13 7 - 20 mg/dL    CREATININE 0.7 0.6 - 1.2 mg/dL    GFR Non-African American >60 >60    GFR African American >60 >60    Calcium 8.8 8.3 - 10.6 mg/dL   Basic Metabolic Panel w/ Reflex to MG    Collection Time: 03/19/20  4:21 AM   Result Value Ref Range    Potassium reflex Magnesium 3.9 3.5 - 5.1 mmol/L   CBC Auto Differential    Collection Time: 03/19/20  4:21 AM   Result

## 2020-03-19 NOTE — DISCHARGE SUMMARY
on the hospital     Hx of stroke  Continue with statin   Continue with aspirin      Leukocytosis  Most likely secondary to hip fracture  No need of antibiotics for the moment  Improved        Discharge Medications:   Current Discharge Medication List      START taking these medications    Details   oxyCODONE (ROXICODONE) 5 MG immediate release tablet Take 1-2 tablets by mouth every 4 hours as needed for Pain for up to 7 days. Qty: 30 tablet, Refills: 0    Comments: Reduce doses taken as pain becomes manageable  Associated Diagnoses: Closed displaced intertrochanteric fracture of right femur, initial encounter ([facility] [address]           Current Discharge Medication List      CONTINUE these medications which have CHANGED    Details   ALPRAZolam (XANAX) 0.5 MG tablet Take 1 tablet by mouth 2 times daily as needed for Anxiety (SVT) for up to 3 days. Qty: 6 tablet, Refills: 0    Associated Diagnoses: Anxiety           Current Discharge Medication List      CONTINUE these medications which have NOT CHANGED    Details   atorvastatin (LIPITOR) 10 MG tablet Take 10 mg by mouth nightly      metFORMIN (GLUCOPHAGE) 1000 MG tablet Take 1 tablet by mouth 2 times daily (with meals)  Qty: 60 tablet, Refills: 0      aspirin 325 MG EC tablet Take 1 tablet by mouth daily  Qty: 30 tablet, Refills: 3      QUEtiapine (SEROQUEL) 100 MG tablet Take 100 mg by mouth nightly as needed (insomnia)       Semaglutide (OZEMPIC, 0.25 OR 0.5 MG/DOSE, SC) Inject 0.25 mg into the skin once a week       Dexlansoprazole (DEXILANT) 60 MG CPDR Take 60 mg by mouth daily.   Qty: 30 capsule, Refills: 5      DULoxetine (CYMBALTA) 60 MG extended release capsule Take 60 mg by mouth 2 times daily       SUMAtriptan (IMITREX) 50 MG tablet Take 1 tablet by mouth once as needed for Migraine  Qty: 9 tablet, Refills: 5           Current Discharge Medication List      STOP taking these medications       amLODIPine (NORVASC) 10 MG tablet Comments:   Reason for Stopping: lisinopril (PRINIVIL;ZESTRIL) 20 MG tablet Comments:   Reason for Stopping:         atenolol (TENORMIN) 50 MG tablet Comments:   Reason for Stopping:         fenofibrate 160 MG tablet Comments:   Reason for Stopping:                       Assessment on Discharge: Stable, improved     Discharge ROS:  A complete review of systems was asked and negative. Discharge Exam:  BP 93/63   Pulse 108   Temp 97.4 °F (36.3 °C) (Oral)   Resp 16   Ht 5' 6\" (1.676 m)   Wt 181 lb (82.1 kg)   SpO2 95%   BMI 29.21 kg/m²     Gen: NAD  HEENT: NC/AT, moist mucous membranes, no oropharyngeal erythema or exudate  Neck: supple, trachea midline, no anterior cervical or SC LAD  Heart:  Normal s1/s2, RRR, no murmurs, gallops, or rubs. no leg edema  Lungs:  CTA bilaterally, no wheeze,no rales or rhonchi, no use of accessory muscles  Abd: bowel sounds present, soft, nontender, nondistended, no masses  Extrem:  No clubbing, cyanosis,  no edema  Skin: no lesion or masses  Psych:  A & O x3  Neuro: grossly intact, moves all four extremities    Pertinent Studies During Hospital Stay:  Radiology:  Ct Head Wo Contrast    Result Date: 3/18/2020  EXAMINATION: CT OF THE HEAD WITHOUT CONTRAST  3/18/2020 12:05 am TECHNIQUE: CT of the head was performed without the administration of intravenous contrast. Dose modulation, iterative reconstruction, and/or weight based adjustment of the mA/kV was utilized to reduce the radiation dose to as low as reasonably achievable. COMPARISON: December 3, 2019 HISTORY: ORDERING SYSTEM PROVIDED HISTORY: fall TECHNOLOGIST PROVIDED HISTORY: Reason for exam:->fall Has a \"code stroke\" or \"stroke alert\" been called? ->No Reason for Exam: fall pain right hip,  did not hit head FINDINGS: BRAIN/VENTRICLES: There is no acute intracranial hemorrhage, mass effect or midline shift. No abnormal extra-axial fluid collection. The gray-white differentiation is maintained without evidence of an acute infarct.   There is no evidence of hydrocephalus. ORBITS: The visualized portion of the orbits demonstrate no acute abnormality. SINUSES: The visualized paranasal sinuses and mastoid air cells demonstrate no acute abnormality. SOFT TISSUES/SKULL:  No acute abnormality of the visualized skull or soft tissues. No acute intracranial abnormality. Ct Pelvis Wo Contrast Additional Contrast? None    Result Date: 3/18/2020  EXAMINATION: CT OF THE PELVIS WITHOUT CONTRAST 3/17/2020 9:06 pm TECHNIQUE: CT of the pelvis was performed without the administration of intravenous contrast.  Multiplanar reformatted images are provided for review. Dose modulation, iterative reconstruction, and/or weight based adjustment of the mA/kV was utilized to reduce the radiation dose to as low as reasonably achievable. COMPARISON: Hip radiograph performed same day HISTORY: ORDERING SYSTEM PROVIDED HISTORY: right hip fx TECHNOLOGIST PROVIDED HISTORY: Reason for exam:->right hip fx Additional Contrast?->None Reason for Exam: fall,  pain right hip FINDINGS: The pelvic and obturator rings are intact. The SI joints and pubic symphysis are congruent. Postsurgical changes noted in the lower lumbar spine, incompletely evaluated. No stress, insufficiency, or traumatic fractures are identified within the left hip. No joint effusion is seen. Imaging of the right hip shows a comminuted, intertrochanteric fracture, with impaction and medial angulation of the distal fragment. Surrounding soft tissue swelling is noted. No dislocation. Moderate diverticulosis of the visualized large bowel is seen without CT evidence of diverticulitis. Pelvic viscera otherwise unremarkable. Right comminuted intertrochanteric hip fracture.      Xr Chest Portable    Result Date: 3/18/2020  EXAMINATION: ONE XRAY VIEW OF THE CHEST 3/18/2020 12:01 am COMPARISON: December 2, 2019 HISTORY: ORDERING SYSTEM PROVIDED HISTORY: hip fx TECHNOLOGIST PROVIDED HISTORY: Reason for exam:->hip fx Reason for Exam: fx right hip FINDINGS: Marginal inspiration is noted. No focal area of consolidation, pneumothorax, or evidence of congestive heart failure is present. Heart size, mediastinal contours and bony structures are stable. Postsurgical changes are seen involving the left breast.     No evidence of acute cardiopulmonary disease     Fluoro For Surgical Procedures    Result Date: 3/18/2020  Radiology exam is complete. No Radiologist dictation. Please follow up with ordering provider. Xr Hip 2-3 Vw W Pelvis Right    Result Date: 3/18/2020  Radiology exam is complete. No Radiologist dictation. Please follow up with ordering provider. Xr Hip 2-3 Vw W Pelvis Right    Result Date: 3/18/2020  EXAMINATION: ONE XRAY VIEW OF THE PELVIS AND TWO XRAY VIEWS RIGHT HIP 3/17/2020 11:50 pm COMPARISON: Chest radiograph 03/18/2020. HISTORY: ORDERING SYSTEM PROVIDED HISTORY: fall. pain TECHNOLOGIST PROVIDED HISTORY: Reason for exam:->fall. pain Reason for Exam: fall  pain FINDINGS: Two views of the right hip and two views of the right femur performed. Lower lumbar degenerative disc and joint disease with prior right L4/5 posterior fusion. Normal bilateral sacroiliac alignment. Normal bilateral hip alignment. No acute pelvic fracture. The left proximal femur is unremarkable. There is an acute mildly angulated right proximal femur intertrochanteric fracture. The right femoral mid diaphysis demonstrates fusiform cortical thickening and intramedullary sclerosis which may represent a remote healed fracture. Normal knee alignment. Moderate vascular calcifications. 1. Acute mildly angulated right proximal femur intertrochanteric fracture. Normal hip alignment. 2. Right mid femoral diaphyseal sclerotic changes which may represent a remote healed fracture. No acute diaphyseal fracture.            Last Labs on Discharge:     Recent Results (from the past 24 hour(s))   POCT Glucose    Collection Time: 03/18/20  4:48 PM Result Value Ref Range    POC Glucose 194 (H) 70 - 99 mg/dl    Performed on ACCU-CHEK    POCT Glucose    Collection Time: 03/18/20  8:40 PM   Result Value Ref Range    POC Glucose 216 (H) 70 - 99 mg/dl    Performed on ACCU-CHEK    Basic Metabolic Panel    Collection Time: 03/19/20  4:21 AM   Result Value Ref Range    Sodium 134 (L) 136 - 145 mmol/L    Potassium 3.9 3.5 - 5.1 mmol/L    Chloride 97 (L) 99 - 110 mmol/L    CO2 23 21 - 32 mmol/L    Anion Gap 14 3 - 16    Glucose 193 (H) 70 - 99 mg/dL    BUN 13 7 - 20 mg/dL    CREATININE 0.7 0.6 - 1.2 mg/dL    GFR Non-African American >60 >60    GFR African American >60 >60    Calcium 8.8 8.3 - 10.6 mg/dL   Basic Metabolic Panel w/ Reflex to MG    Collection Time: 03/19/20  4:21 AM   Result Value Ref Range    Potassium reflex Magnesium 3.9 3.5 - 5.1 mmol/L   CBC Auto Differential    Collection Time: 03/19/20  4:21 AM   Result Value Ref Range    WBC 8.9 4.0 - 11.0 K/uL    RBC 3.67 (L) 4.00 - 5.20 M/uL    Hemoglobin 11.6 (L) 12.0 - 16.0 g/dL    Hematocrit 34.2 (L) 36.0 - 48.0 %    MCV 93.3 80.0 - 100.0 fL    MCH 31.6 26.0 - 34.0 pg    MCHC 33.9 31.0 - 36.0 g/dL    RDW 13.7 12.4 - 15.4 %    Platelets 430 526 - 586 K/uL    MPV 8.0 5.0 - 10.5 fL    Neutrophils % 69.5 %    Lymphocytes % 17.0 %    Monocytes % 7.9 %    Eosinophils % 5.1 %    Basophils % 0.5 %    Neutrophils Absolute 6.2 1.7 - 7.7 K/uL    Lymphocytes Absolute 1.5 1.0 - 5.1 K/uL    Monocytes Absolute 0.7 0.0 - 1.3 K/uL    Eosinophils Absolute 0.5 0.0 - 0.6 K/uL    Basophils Absolute 0.0 0.0 - 0.2 K/uL   POCT Glucose    Collection Time: 03/19/20  6:10 AM   Result Value Ref Range    POC Glucose 169 (H) 70 - 99 mg/dl    Performed on ACCU-CHEK    POCT Glucose    Collection Time: 03/19/20 11:35 AM   Result Value Ref Range    POC Glucose 173 (H) 70 - 99 mg/dl    Performed on ACCU-CHEK          Follow up: with Elsie Hearn MD    Note that over 30 minutes was spent in preparing discharge papers, discussing discharge with patient, medication review, etc.    Thank you Lissa Bloom MD for the opportunity to be involved in this patient's care. If you have any questions or concerns please feel free to contact me at [phone].     Electronically signed by Lili Dougherty MD on 3/19/2020 at 12:29 PM

## 2020-03-19 NOTE — PROGRESS NOTES
Pt AAO x4 per this shift. Pt c/o pf pain to surgical sites with muscle spasms. PRN medication per MD orders, ice, repositioning. Somewhat effective but ongoing. Pt tolerating  Po fluids. No further needs voiced at this time. Incentive spiromerter education competed. Fall precautions in place. Bed alarm on. Call light within reach. Will continue to monitor.

## 2020-03-20 VITALS
RESPIRATION RATE: 18 BRPM | WEIGHT: 179.23 LBS | TEMPERATURE: 98.2 F | DIASTOLIC BLOOD PRESSURE: 78 MMHG | HEART RATE: 134 BPM | OXYGEN SATURATION: 93 % | HEIGHT: 66 IN | BODY MASS INDEX: 28.81 KG/M2 | SYSTOLIC BLOOD PRESSURE: 148 MMHG

## 2020-03-20 LAB
GLUCOSE BLD-MCNC: 161 MG/DL (ref 70–99)
GLUCOSE BLD-MCNC: 183 MG/DL (ref 70–99)
GLUCOSE BLD-MCNC: 184 MG/DL (ref 70–99)
GLUCOSE BLD-MCNC: 216 MG/DL (ref 70–99)
HCT VFR BLD CALC: 29.7 % (ref 36–48)
HEMOGLOBIN: 10.2 G/DL (ref 12–16)
MCH RBC QN AUTO: 31.9 PG (ref 26–34)
MCHC RBC AUTO-ENTMCNC: 34.3 G/DL (ref 31–36)
MCV RBC AUTO: 92.9 FL (ref 80–100)
PDW BLD-RTO: 13.6 % (ref 12.4–15.4)
PERFORMED ON: ABNORMAL
PLATELET # BLD: 187 K/UL (ref 135–450)
PMV BLD AUTO: 7.9 FL (ref 5–10.5)
RBC # BLD: 3.2 M/UL (ref 4–5.2)
WBC # BLD: 9.3 K/UL (ref 4–11)

## 2020-03-20 PROCEDURE — 6370000000 HC RX 637 (ALT 250 FOR IP): Performed by: ORTHOPAEDIC SURGERY

## 2020-03-20 PROCEDURE — 6370000000 HC RX 637 (ALT 250 FOR IP): Performed by: HOSPITALIST

## 2020-03-20 PROCEDURE — 6360000002 HC RX W HCPCS: Performed by: ORTHOPAEDIC SURGERY

## 2020-03-20 PROCEDURE — 97110 THERAPEUTIC EXERCISES: CPT

## 2020-03-20 PROCEDURE — 6370000000 HC RX 637 (ALT 250 FOR IP): Performed by: NURSE PRACTITIONER

## 2020-03-20 PROCEDURE — 97530 THERAPEUTIC ACTIVITIES: CPT

## 2020-03-20 PROCEDURE — 2580000003 HC RX 258: Performed by: ORTHOPAEDIC SURGERY

## 2020-03-20 PROCEDURE — 94760 N-INVAS EAR/PLS OXIMETRY 1: CPT

## 2020-03-20 PROCEDURE — 36415 COLL VENOUS BLD VENIPUNCTURE: CPT

## 2020-03-20 PROCEDURE — 97535 SELF CARE MNGMENT TRAINING: CPT

## 2020-03-20 PROCEDURE — 85027 COMPLETE CBC AUTOMATED: CPT

## 2020-03-20 RX ORDER — SENNA AND DOCUSATE SODIUM 50; 8.6 MG/1; MG/1
1 TABLET, FILM COATED ORAL 2 TIMES DAILY
Status: CANCELLED | OUTPATIENT
Start: 2020-03-20

## 2020-03-20 RX ORDER — NICOTINE POLACRILEX 4 MG
15 LOZENGE BUCCAL PRN
Status: CANCELLED | OUTPATIENT
Start: 2020-03-20

## 2020-03-20 RX ORDER — SODIUM CHLORIDE 0.9 % (FLUSH) 0.9 %
10 SYRINGE (ML) INJECTION EVERY 12 HOURS SCHEDULED
Status: CANCELLED | OUTPATIENT
Start: 2020-03-20

## 2020-03-20 RX ORDER — CYCLOBENZAPRINE HCL 10 MG
5 TABLET ORAL 2 TIMES DAILY PRN
Status: CANCELLED | OUTPATIENT
Start: 2020-03-20

## 2020-03-20 RX ORDER — DEXTROSE MONOHYDRATE 25 G/50ML
12.5 INJECTION, SOLUTION INTRAVENOUS PRN
Status: CANCELLED | OUTPATIENT
Start: 2020-03-20

## 2020-03-20 RX ORDER — ONDANSETRON 4 MG/1
4 TABLET, FILM COATED ORAL EVERY 8 HOURS PRN
Status: CANCELLED | OUTPATIENT
Start: 2020-03-20

## 2020-03-20 RX ORDER — ACETAMINOPHEN 325 MG/1
650 TABLET ORAL EVERY 4 HOURS PRN
Status: CANCELLED | OUTPATIENT
Start: 2020-03-20

## 2020-03-20 RX ORDER — ATORVASTATIN CALCIUM 10 MG/1
10 TABLET, FILM COATED ORAL NIGHTLY
Status: CANCELLED | OUTPATIENT
Start: 2020-03-20

## 2020-03-20 RX ORDER — PANTOPRAZOLE SODIUM 40 MG/1
40 TABLET, DELAYED RELEASE ORAL DAILY PRN
Status: CANCELLED | OUTPATIENT
Start: 2020-03-20

## 2020-03-20 RX ORDER — OXYCODONE HYDROCHLORIDE 5 MG/1
5 TABLET ORAL EVERY 4 HOURS PRN
Status: CANCELLED | OUTPATIENT
Start: 2020-03-20

## 2020-03-20 RX ORDER — DEXTROSE MONOHYDRATE 50 MG/ML
100 INJECTION, SOLUTION INTRAVENOUS PRN
Status: CANCELLED | OUTPATIENT
Start: 2020-03-20

## 2020-03-20 RX ORDER — QUETIAPINE FUMARATE 100 MG/1
100 TABLET, FILM COATED ORAL NIGHTLY PRN
Status: CANCELLED | OUTPATIENT
Start: 2020-03-20

## 2020-03-20 RX ORDER — BISACODYL 10 MG
10 SUPPOSITORY, RECTAL RECTAL DAILY PRN
Status: CANCELLED | OUTPATIENT
Start: 2020-03-20

## 2020-03-20 RX ORDER — OXYCODONE HYDROCHLORIDE 10 MG/1
10 TABLET ORAL EVERY 4 HOURS PRN
Status: CANCELLED | OUTPATIENT
Start: 2020-03-20

## 2020-03-20 RX ORDER — POLYETHYLENE GLYCOL 3350 17 G/17G
17 POWDER, FOR SOLUTION ORAL DAILY
Status: CANCELLED | OUTPATIENT
Start: 2020-03-20

## 2020-03-20 RX ORDER — DULOXETIN HYDROCHLORIDE 60 MG/1
60 CAPSULE, DELAYED RELEASE ORAL DAILY
Status: CANCELLED | OUTPATIENT
Start: 2020-03-21

## 2020-03-20 RX ORDER — ALPRAZOLAM 0.5 MG/1
0.5 TABLET ORAL 2 TIMES DAILY PRN
Status: CANCELLED | OUTPATIENT
Start: 2020-03-20

## 2020-03-20 RX ORDER — SODIUM CHLORIDE 0.9 % (FLUSH) 0.9 %
10 SYRINGE (ML) INJECTION PRN
Status: CANCELLED | OUTPATIENT
Start: 2020-03-20

## 2020-03-20 RX ADMIN — ATORVASTATIN CALCIUM 10 MG: 10 TABLET, FILM COATED ORAL at 21:30

## 2020-03-20 RX ADMIN — INSULIN LISPRO 2 UNITS: 100 INJECTION, SOLUTION INTRAVENOUS; SUBCUTANEOUS at 17:32

## 2020-03-20 RX ADMIN — DULOXETINE HYDROCHLORIDE 60 MG: 60 CAPSULE, DELAYED RELEASE ORAL at 08:40

## 2020-03-20 RX ADMIN — OXYCODONE HYDROCHLORIDE 10 MG: 10 TABLET ORAL at 21:30

## 2020-03-20 RX ADMIN — CYCLOBENZAPRINE HYDROCHLORIDE 5 MG: 10 TABLET, FILM COATED ORAL at 12:57

## 2020-03-20 RX ADMIN — OXYCODONE HYDROCHLORIDE 10 MG: 10 TABLET ORAL at 08:40

## 2020-03-20 RX ADMIN — SODIUM CHLORIDE, PRESERVATIVE FREE 10 ML: 5 INJECTION INTRAVENOUS at 08:41

## 2020-03-20 RX ADMIN — SENNOSIDES AND DOCUSATE SODIUM 1 TABLET: 8.6; 5 TABLET ORAL at 21:30

## 2020-03-20 RX ADMIN — DOCUSATE SODIUM 100 MG: 100 CAPSULE, LIQUID FILLED ORAL at 08:40

## 2020-03-20 RX ADMIN — INSULIN LISPRO 1 UNITS: 100 INJECTION, SOLUTION INTRAVENOUS; SUBCUTANEOUS at 12:16

## 2020-03-20 RX ADMIN — ASPIRIN 325 MG: 325 TABLET, DELAYED RELEASE ORAL at 08:40

## 2020-03-20 RX ADMIN — INSULIN LISPRO 1 UNITS: 100 INJECTION, SOLUTION INTRAVENOUS; SUBCUTANEOUS at 08:39

## 2020-03-20 RX ADMIN — ENOXAPARIN SODIUM 40 MG: 40 INJECTION SUBCUTANEOUS at 08:40

## 2020-03-20 RX ADMIN — OXYCODONE HYDROCHLORIDE 10 MG: 10 TABLET ORAL at 17:33

## 2020-03-20 RX ADMIN — OXYCODONE HYDROCHLORIDE 10 MG: 10 TABLET ORAL at 03:01

## 2020-03-20 RX ADMIN — OXYCODONE HYDROCHLORIDE 10 MG: 10 TABLET ORAL at 12:58

## 2020-03-20 ASSESSMENT — PAIN DESCRIPTION - FREQUENCY
FREQUENCY: CONTINUOUS

## 2020-03-20 ASSESSMENT — PAIN SCALES - GENERAL
PAINLEVEL_OUTOF10: 8
PAINLEVEL_OUTOF10: 0
PAINLEVEL_OUTOF10: 7
PAINLEVEL_OUTOF10: 0
PAINLEVEL_OUTOF10: 7
PAINLEVEL_OUTOF10: 8
PAINLEVEL_OUTOF10: 9
PAINLEVEL_OUTOF10: 7
PAINLEVEL_OUTOF10: 6
PAINLEVEL_OUTOF10: 5

## 2020-03-20 ASSESSMENT — PAIN DESCRIPTION - PROGRESSION
CLINICAL_PROGRESSION: NOT CHANGED

## 2020-03-20 ASSESSMENT — PAIN DESCRIPTION - ONSET
ONSET: ON-GOING

## 2020-03-20 ASSESSMENT — PAIN DESCRIPTION - ORIENTATION
ORIENTATION: RIGHT

## 2020-03-20 ASSESSMENT — PAIN DESCRIPTION - PAIN TYPE
TYPE: ACUTE PAIN;SURGICAL PAIN
TYPE: SURGICAL PAIN

## 2020-03-20 ASSESSMENT — PAIN DESCRIPTION - DESCRIPTORS
DESCRIPTORS: ACHING

## 2020-03-20 ASSESSMENT — PAIN - FUNCTIONAL ASSESSMENT
PAIN_FUNCTIONAL_ASSESSMENT: PREVENTS OR INTERFERES SOME ACTIVE ACTIVITIES AND ADLS

## 2020-03-20 ASSESSMENT — PAIN DESCRIPTION - DIRECTION: RADIATING_TOWARDS: KNEE

## 2020-03-20 ASSESSMENT — PAIN DESCRIPTION - LOCATION
LOCATION: HIP

## 2020-03-20 ASSESSMENT — PAIN SCALES - WONG BAKER
WONGBAKER_NUMERICALRESPONSE: 0

## 2020-03-20 NOTE — PROGRESS NOTES
functional limits     Subjective  General  Chart Reviewed: Yes  Additional Pertinent Hx: Per Dr. Laura Polanco, \"The patient is a 69 y.o. female  who sustained a fall onto the right hip. The patient was brought to the [facility] and was discovered to have a right intertrochanteric hip fracture. The patient was subsequently admitted to the hospital for definitive treatment. An orthopedic consultation was obtained. I recommended operative fixation of the hip. \"  Response To Previous Treatment: Patient with no complaints from previous session.   Family / Caregiver Present: No  Diagnosis: R hip fx; Gamma nail  Follows Commands: Within Functional Limits  Subjective  Subjective: to room along with OT- patient sitting up in recliner- expressing dismay over her situation- provided encouragement - agreeable to PTOT session and up to walker   Pain Screening  Patient Currently in Pain: Yes  Pain Assessment  Pain Assessment: 0-10  Pain Level: (rates to 6/10 to nursing)  Orientation  Orientation  Overall Orientation Status: Within Functional Limits  Social/Functional History  Social/Functional History  Lives With: Daughter  Type of Home: Apartment(Condo)  Home Layout: Multi-level, Able to Live on Main level with bedroom/bathroom  Home Access: Stairs to enter with rails  Entrance Stairs - Number of Steps: 1  Bathroom Shower/Tub: Walk-in shower  Bathroom Toilet: Handicap height  Bathroom Equipment: Shower chair, Grab bars in shower  Home Equipment: Rolling walker, Cane  ADL Assistance: Independent  Homemaking Assistance: Independent  Ambulation Assistance: Independent  Transfer Assistance: Independent  Active : Yes  Mode of Transportation: Car  Occupation: Retired  Additional Comments: Denies hx of additional falls  Cognition   Cognition  Overall Cognitive Status: WFL  Objective  Sensation  Overall Sensation Status: WFL  Bed mobility  Supine to Sit: (not observed at this session)  Transfers  Sit to Stand: Minimal Assistance; Moderate Assistance;2 Person Assistance  Stand to sit: Minimal Assistance; Moderate Assistance;2 Person Assistance(second assist to ease the landing)  Ambulation  Ambulation?: No(focus of session to transfers and improving static stance and weight bearing)    Balance  Comments: with cues able to gain a good static stance on the walker and improved with transition of from arm rest to the walker  Exercises  Knee Short Arc Quad: 10 per hour right and left in slow pace  Ankle Pumps: 30 per hour in easy pace  Comments: encouraged practice with the spirometer as instructed by RT     Plan   Plan  Times per week: qd x1 then 3-5 while on acute floor  Specific instructions for Next Treatment: Progress EOB/OOB activity as tolerated  Current Treatment Recommendations: Functional Mobility Training, Transfer Training, Gait Training, Modalities, Positioning, Patient/Caregiver Education & Training, ADL/Self-care Training  Safety Devices  Type of devices: All fall risk precautions in place, Call light within reach, Chair alarm in place, Left in chair, Nurse notified(Jocy)  AM-PAC Score  AM-PAC Inpatient Mobility Raw Score : 11 (03/20/20 1022)  AM-PAC Inpatient T-Scale Score : 33.86 (03/20/20 1022)  Mobility Inpatient CMS 0-100% Score: 72.57 (03/20/20 1022)  Mobility Inpatient CMS G-Code Modifier : CL (03/20/20 1022)    Goals  Short term goals  Time Frame for Short term goals: In 2-3 days pt will perform  Short term goal 1: Bed mobility Mod A x 2  Short term goal 2: Transfers Min A x 2  Short term goal 3: Ambulation 5' with RW, Mod A x 2  Long term goals  Time Frame for Long term goals : LTG = STG  Patient Goals   Patient goals :  To return home when able       Therapy Time   Individual Concurrent Group Co-treatment   Time In       0920   Time Out       1015   Minutes       [address], PT

## 2020-03-20 NOTE — PROGRESS NOTES
All discharge paperwork completed and packet up front. Report called to [facility] and spoke with [facility]. Pt aware and in agreement with discharge to SNF. IV already removed, no complications. All belongings packed up and pt ready to go. Await transport.   Electronically signed by Aria Reed RN on 3/20/2020 at 6:41 PM

## 2020-03-20 NOTE — PLAN OF CARE
Problem: Falls - Risk of:  Goal: Will remain free from falls  Description: Will remain free from falls  Outcome: Ongoing  Note: Fall risk assessment completed. Fall precautions in place. Call light within reach. Pt educated on calling for assistance before getting up. Walkway free of clutter. Will continue to monitor. Goal: Absence of physical injury  Description: Absence of physical injury  Outcome: Ongoing  Note: Pt is free of injury. No injury noted. Fall precautions in place. Call light within reach. Will monitor. Problem: Pain:  Goal: Pain level will decrease  Description: Pain level will decrease  Outcome: Ongoing  Goal: Control of acute pain  Description: Control of acute pain  Outcome: Ongoing  Note: Pt assessed for pain. Pt in pain and assessed with 0-10 pain rating scale. Pt given prescribed analgesic for pain. (See eMar) Pt satisfied with pain relief thus far. Will reassess and continue to monitor.      Goal: Control of chronic pain  Description: Control of chronic pain  Outcome: Ongoing    Electronically signed by Vanesa Tavera RN on 3/20/2020 at 1:53 AM

## 2020-03-20 NOTE — CARE COORDINATION
Tacos received call from Simeon at [facility] that patient's acute rehab request has been denied. [facility] for additional information. Tacos left message for Georgiana ([phone] extension [phone]) to clarify if the Peer to Peer and updated therapy notes were reviewed prior to denial.   Tacos attempted to update patient's daughter,Angela ([phone]). --no voicemail. Per her message, they would like patient to go to [facility]. [facility] can accept patient. Patient will need [employer] precert.      Electronically signed by Poppy Puentes on 3/20/2020 at 2:14 PM

## 2020-03-20 NOTE — PROGRESS NOTES
Clinical Pharmacy Note  Medication Counseling    Reviewed new medications started during hospital admission: Alprazolam, Oxycodone. Indications and side effects were emphasized during counseling. All medication-related questions addressed. Patient verbalized understanding of education. Should the patient express any additional questions or concerns regarding their medications, please do not hesitate to contact the pharmacy department. Patient/caregiver aware they may refuse medications during hospital stay. 5 minutes spent educating patient regarding medications.   Lluvia Lancaster, [address] 3/20/2020 10:05 AM

## 2020-03-20 NOTE — PROGRESS NOTES
Occupational Therapy  Facility/Department: [address] ORTHOPEDICS  Daily Treatment Note  NAME: Aldona Schlatter  :   MRN: [id number]    Date of Service: 3/20/2020    Discharge Recommendations:  5-7 sessions per week     Aldona Schlatter scored a 16/24 on the  [address] form. Current research shows that an AM-PAC score of 17 or less is typically not associated with a discharge to the patient's home setting. Based on the patients AM-PAC score and their current ADL deficits, it is recommended that the patient have 5-7 sessions per week of Occupational Therapy at d/c to increase the patients independence. Assessment: Discussed with OTR am pac score is 16 which indicates need for continued skilled OT to increase Locke to return to PLOF of living at home with daughter. Patient able to complete sit<>stand with Min/Mod A of 2. Able to static  elsa stedy and with RW for ADL tasks and weight shiftiing to progress to functional mobility. Anticipate that patient will be able to participate in high intesive therapy. Cont with POC. Patient Diagnosis(es): The primary encounter diagnosis was Closed displaced intertrochanteric fracture of right femur, initial encounter ([address] A diagnosis of Anxiety was also pertinent to this visit. has a past medical history of Arthritis, Back pain, Cancer ([address] Diabetes, High blood pressure, Hyperlipidemia, Seizure ([address] SVT (supraventricular tachycardia) ([address] and Type II or unspecified type diabetes mellitus without mention of complication, not stated as uncontrolled. has a past surgical history that includes back surgery; Carpal tunnel release; Hysterectomy; Tonsillectomy; lymph node biopsy (Left); Colonoscopy; Intracapsular cataract extraction (Right, 3/11/2019); Breast lumpectomy (Left);  Intracapsular cataract extraction (Left, 3/25/2019); and Hip fracture surgery (Right, 3/18/2020). Restrictions  Restrictions/Precautions  Restrictions/Precautions: Weight Bearing, Fall Risk  Lower Extremity Weight Bearing Restrictions  Right Lower Extremity Weight Bearing: Weight Bearing As Tolerated  Position Activity Restriction  Other position/activity restrictions: Chronic R-sided weakness s/p CVA  Subjective   General  Chart Reviewed: Yes  Patient assessed for rehabilitation services?: Yes  Additional Pertinent Hx: Pt is 69 y.o. F who presents s/p mechanical fall resulting in R intertrochanteric hip fracture. Pt is s/p R IM nailing and is WBAT. PMH: CVA resulting in R side weakness in Dec 2019, DM, Ca, Seizures  Response to previous treatment: Patient with no complaints from previous session  Family / Caregiver Present: No  Referring Practitioner: Michelle Murray MD  Diagnosis: Right intertrochanteric hip fracture   Subjective  Subjective: Patient seated in recliner chair upon arrival to room with PT. Patient reports pain with mobility, transfers. No reports of dizziness. Very motivated for therapy      Orientation  Orientation  Overall Orientation Status: Within Functional Limits  Objective    ADL  Grooming: Minimal assistance(seated on seat of elsa stedy able to wash hands)  Toileting: Maximum assistance(Max A for katerine area in standing)        Balance  Sitting Balance: Stand by assistance  Standing Balance: Minimal assistance  Standing Balance  Activity: Stood in elsa stedy and with RW   Comment: Focused on static standing for weight shifting to prepare for functional mobility   Toilet Transfers  Equipment Used: Standard bedside commode(over commode)  Toilet Transfer: Minimal assistance;2 Person assistance  Toilet Transfers Comments: use of elsa stedy      Transfers  Sit to stand: Minimal assistance; Moderate assistance;2 Person assistance  Stand to sit: Minimal assistance; Moderate assistance;2 Person assistance  Transfer Comments: Transfer from recliner chair to commode to recliner chair per [facility]       Cognition  Overall Cognitive Status: [facility]        Assessment   Performance deficits / Impairments: Decreased functional mobility ; Decreased strength;Decreased endurance;Decreased ADL status; Decreased balance  Assessment: Discussed with OTR am pac score is 16 which indicates need for continued skilled OT to increase Alfalfa to return to PLOF of living at home with daughter. Patient able to complete sit<>stand with Min/Mod A of 2. Able to static  elsa stedy and with RW for ADL tasks and weight shiftiing to progress to functional mobility. Anticipate that patient will be able to participate in high intesive therapy. Cont with POC. OT Education: OT Role;Precautions;Plan of Care;ADL Adaptive Strategies;Transfer Training  REQUIRES OT FOLLOW UP: Yes  Activity Tolerance  Activity Tolerance: Patient Tolerated treatment well  Safety Devices  Safety Devices in place: Yes  Type of devices: Left in chair;Call light within reach; Chair alarm in place;Gait belt;Nurse notified        Plan   Plan  Times per week: 3-5  Current Treatment Recommendations: Functional Mobility Training, Strengthening, Endurance Training, Balance Training, Safety Education & Training, Equipment Evaluation, Education, & procurement, Patient/Caregiver Education & Training, Self-Care / ADL    AM-[facility] Score        AM-[facility] Inpatient Daily Activity Raw Score: 16 (03/20/20 1012)  AM-PAC Inpatient ADL T-Scale Score : 35.96 (03/20/20 1012)  ADL Inpatient CMS 0-100% Score: 53.32 (03/20/20 1012)  ADL Inpatient CMS G-Code Modifier : CK (03/20/20 1012)    Goals  Short term goals  Time Frame for Short term goals: prior to d/c: status of goals ongoing as of 3/20/20   Short term goal 1: Pt will complete sit <> stand transfers with min A  Short term goal 2: Pt will complete functional mobility with RW with min A  Short term goal 3: Pt will complete toileting with min A   Short term goal 4: Pt will tolerate 3+ minutes of standing activity with

## 2020-03-20 NOTE — PROGRESS NOTES
Pt A&O x4. VSS. Denies dizziness today. Pt skin sweaty this morning, no temperature. Room was hot and temp decreased and pt says she feels better. Oxycodone given for pain (see eMAR). Silicone dressing to R hip is clean, dry, intact. KEVIN pedal pulses palpable. Pt denies nausea, but tells me she has a decreased appetite d/t pain, pt tolerating liquids just fine. Will monitor.   Electronically signed by Juan Manuel Sandhu RN on 3/20/2020 at 8:53 AM

## 2020-03-20 NOTE — CARE COORDINATION
[facility] Medicare Authorization       Authorization ID  [id number]    Start date  3/20/2020 for 5 days    Next review date   Tuesday, 3/24/2020    Fax update to   [facility]  [phone]      PDPM    PT/OT  TE  [address]  NF    SLP  SD    [place]  PDE1    Electronically signed by Ashley Mendoza on 3/20/2020 at 5:06 PM

## 2020-03-20 NOTE — DISCHARGE SUMMARY
Hospitalist Discharge Summary    Patient ID:  Juany Schafer  [id number]  86 y.o.  1945    Admit date: 3/17/2020    Discharge date: 3/20/2020    Disposition: SNF    Admission Diagnoses:   Patient Active Problem List   Diagnosis    Difficulty with family    Other and unspecified hyperlipidemia    [facility]ulsions ([facility]    Pain in joint, forearm    Hypercalcemia    Malignant neoplasm of other specified sites of female breast    Depressive disorder, not elsewhere classified    Symptomatic menopausal or female climacteric states    Degeneration of lumbar or lumbosacral intervertebral disc    Esophageal reflux    Essential hypertension, benign    Type II or unspecified type diabetes mellitus without mention of complication, not stated as uncontrolled    Nonspecific elevation of levels of transaminase or lactic acid dehydrogenase (LDH)    Received intravenous tissue plasminogen activator (tPA) in emergency department    Acute bilateral cerebral infarction in a watershed distribution [facility])    Bone fracture    Closed fracture of right hip ([facility]       Discharge Diagnoses: Active Problems:    Bone fracture    Closed fracture of right hip (HCC)  Resolved Problems:    * No resolved hospital problems. *      Code Status:  Full Code    Condition:  Stable    Discharge Diet: Diet:  Dietary Nutrition Supplements: Standard High Calorie Oral Supplement  DIET CARB CONTROL;    PCP to do list:        Hospital Course:     Patient presented to the hospital with a fall and right hip pain. She was found to have right hip Fx. She was treated surgically and she is being discharge to follow up with PCP as outpatient      Right hip fracture:   Status post OR 3/18  Ortho are following.   Pain management   PT/OT     Hypertension  Hold amlodipine, atenolol, and lisinopril due to hypotension         Depression/anxiety  Continue with duloxetine, and Xanax     Type 2 diabetes mellitus:  Sliding scale Stopping:         lisinopril (PRINIVIL;ZESTRIL) 20 MG tablet Comments:   Reason for Stopping:         atenolol (TENORMIN) 50 MG tablet Comments:   Reason for Stopping:         fenofibrate 160 MG tablet Comments:   Reason for Stopping:                   Procedures:     Assessment on Discharge: Stable, improved     Discharge ROS:  A complete review of systems was asked and negative. Discharge Exam:  /77   Pulse 111   Temp 98.5 °F (36.9 °C) (Oral)   Resp 16   Ht 5' 6\" (1.676 m)   Wt 179 lb 3.7 oz (81.3 kg)   SpO2 94%   BMI 28.93 kg/m²     Gen: NAD  HEENT: NC/AT, moist mucous membranes, no oropharyngeal erythema or exudate  Neck: supple, trachea midline, no anterior cervical or SC LAD  Heart:  Normal s1/s2, RRR, no murmurs, gallops, or rubs. no leg edema  Lungs:  CTA bilaterally, no wheeze,no rales or rhonchi, non use of accessory muscles  Abd: bowel sounds present, soft, nontender, nondistended, no masses  Extrem:  No clubbing, cyanosis,  no edema  Skin: no lesion or masses  Psych:  A & O x3  Neuro: grossly intact, moves all four extremities    Pertinent Studies During Hospital Stay:  Radiology:  Ct Head Wo Contrast    Result Date: 3/18/2020  EXAMINATION: CT OF THE HEAD WITHOUT CONTRAST  3/18/2020 12:05 am TECHNIQUE: CT of the head was performed without the administration of intravenous contrast. Dose modulation, iterative reconstruction, and/or weight based adjustment of the mA/kV was utilized to reduce the radiation dose to as low as reasonably achievable. COMPARISON: December 3, 2019 HISTORY: ORDERING SYSTEM PROVIDED HISTORY: fall TECHNOLOGIST PROVIDED HISTORY: Reason for exam:->fall Has a \"code stroke\" or \"stroke alert\" been called? ->No Reason for Exam: fall pain right hip,  did not hit head FINDINGS: BRAIN/VENTRICLES: There is no acute intracranial hemorrhage, mass effect or midline shift. No abnormal extra-axial fluid collection.   The gray-white differentiation is maintained without evidence of

## 2020-03-20 NOTE — CARE COORDINATION
Tacos received message from Gilberto guy at [facility] [employer] ([phone] extension [phone]). She reported Peer to Peer can be completed and requested therapy notes from 3/19/20 and 3/20/20 be faxed to [phone]). Tacos faxed completed PT/OT notes to [facility] [place] [employer].  Dr. Piedad Hunter aware; he also completed the Peer to Peer this am.     Electronically signed by Nelly Johnson on 3/20/2020 at 10:48 AM

## 2020-03-31 ENCOUNTER — OFFICE VISIT (OUTPATIENT)
Dept: ORTHOPEDIC SURGERY | Age: 75
End: 2020-03-31

## 2020-03-31 VITALS — BODY MASS INDEX: 28.81 KG/M2 | RESPIRATION RATE: 16 BRPM | HEIGHT: 66 IN | WEIGHT: 179.23 LBS | TEMPERATURE: 97.2 F

## 2020-03-31 PROCEDURE — 99024 POSTOP FOLLOW-UP VISIT: CPT | Performed by: ORTHOPAEDIC SURGERY

## 2020-03-31 NOTE — PROGRESS NOTES
DIAGNOSIS:  Right intertrochanteric femur fracture, status post Gamma nail. DATE OF SURGERY:  3/18/20    HISTORY OF PRESENT ILLNESS: Ms. Ana Rutherford 69 y.o. female  who comes in today for postoperative visit. The patient rates pain 6/10. She has been weight bearing as tolerated. No numbness or tingling sensation. No fever or chills. [facility]. PHYSICAL EXAMINATION:    Temp 97.2 °F (36.2 °C)   Resp 16   Ht 5' 5.98\" (1.676 m)   Wt 179 lb 3.7 oz (81.3 kg)   BMI 28.94 kg/m²    Patient is awake, alert, and in no acute distress. The incisions are healed well, all staples were removed . No signs of any erythema or drainage. She has no pain with the active or passive range of motion of the right hip. She has intact sensation to light touch distally in bilateral legs. Bilateral legs are warm and well perfused. IMAGING:  Two views (AP and lateral) right  Hip, and AP pelvis were obtained and reviewed. Showed anatomic alignment of the intertrochanteric fracture, Gamma nail in good position, no loosening, or hardware failure. IMPRESSION:    2 weeks status post right Gamma nail of intertrochanteric femur fracture. PLAN:    Will have the patient continue to work on ROM and strengthening with PT. Weight bearing as tolerated. The patient will come back for a follow up in 2 months. At that time, we will take Two views right hip, and AP pelvis. Discussed that this may be modified based on the status of the [facility]. As this patient has demonstrated risk factors for osteoporosis, such as age greater than [de-identified] years and evidence of a fracture, I have referred the patient back to the primary care physician for evaluation for osteoporosis, including consideration for DEXA scanning, if this is felt to be clinically indicated. The patient is advised to contact the primary care physician to follow-up for further evaluation.

## 2020-03-31 NOTE — LETTER
[facility]  [address] [address]  Phone: [phone]  Fax: [phone]    Tawnya Lugo MD        March 31, 2020    [address] [facility] [address]      To Whom it May Concern:    Treatment plan for Elza Bella is as follows: Will have the patient continue to work on ROM and strengthening with PT.     Weight bearing as tolerated.     The patient will come back for a follow up in 2 months. At that time, we will take Two views right hip, and AP pelvis. Discussed that this may be modified based on the status of the [address] pandemic. If you have any questions or concerns, please don't hesitate to call.     Sincerely,          Tawnya Lugo MD

## 2020-04-06 ENCOUNTER — TELEPHONE (OUTPATIENT)
Dept: ORTHOPEDIC SURGERY | Age: 75
End: 2020-04-06

## 2020-04-06 RX ORDER — HYDROCODONE BITARTRATE AND ACETAMINOPHEN 5; 325 MG/1; MG/1
1 TABLET ORAL 2 TIMES DAILY PRN
Qty: 30 TABLET | Refills: 0 | Status: SHIPPED | OUTPATIENT
Start: 2020-04-06 | End: 2020-04-13

## 2020-04-08 ENCOUNTER — TELEPHONE (OUTPATIENT)
Dept: ORTHOPEDIC SURGERY | Age: 75
End: 2020-04-08

## 2020-04-20 ENCOUNTER — TELEPHONE (OUTPATIENT)
Dept: ORTHOPEDIC SURGERY | Age: 75
End: 2020-04-20

## 2020-05-05 ENCOUNTER — OFFICE VISIT (OUTPATIENT)
Dept: ORTHOPEDIC SURGERY | Age: 75
End: 2020-05-05

## 2020-05-05 VITALS — HEIGHT: 66 IN | BODY MASS INDEX: 26.03 KG/M2 | TEMPERATURE: 98 F | WEIGHT: 162 LBS

## 2020-05-05 PROCEDURE — 99024 POSTOP FOLLOW-UP VISIT: CPT | Performed by: ORTHOPAEDIC SURGERY

## 2020-05-05 RX ORDER — HYDROCODONE BITARTRATE AND ACETAMINOPHEN 5; 325 MG/1; MG/1
1 TABLET ORAL 2 TIMES DAILY PRN
Qty: 14 TABLET | Refills: 0 | Status: SHIPPED | OUTPATIENT
Start: 2020-05-05 | End: 2020-05-12

## 2020-06-02 ENCOUNTER — TELEPHONE (OUTPATIENT)
Dept: ORTHOPEDIC SURGERY | Age: 75
End: 2020-06-02

## 2020-07-06 ENCOUNTER — OFFICE VISIT (OUTPATIENT)
Dept: ORTHOPEDIC SURGERY | Age: 75
End: 2020-07-06
Payer: MEDICARE

## 2020-07-06 VITALS — BODY MASS INDEX: 25.2 KG/M2 | WEIGHT: 156.8 LBS | HEIGHT: 66 IN

## 2020-07-06 PROCEDURE — 99213 OFFICE O/P EST LOW 20 MIN: CPT | Performed by: ORTHOPAEDIC SURGERY

## 2020-07-06 PROCEDURE — 1123F ACP DISCUSS/DSCN MKR DOCD: CPT | Performed by: ORTHOPAEDIC SURGERY

## 2020-07-06 PROCEDURE — 3017F COLORECTAL CA SCREEN DOC REV: CPT | Performed by: ORTHOPAEDIC SURGERY

## 2020-07-06 PROCEDURE — 1090F PRES/ABSN URINE INCON ASSESS: CPT | Performed by: ORTHOPAEDIC SURGERY

## 2020-07-06 PROCEDURE — G8417 CALC BMI ABV UP PARAM F/U: HCPCS | Performed by: ORTHOPAEDIC SURGERY

## 2020-07-06 PROCEDURE — G8400 PT W/DXA NO RESULTS DOC: HCPCS | Performed by: ORTHOPAEDIC SURGERY

## 2020-07-06 PROCEDURE — 4040F PNEUMOC VAC/ADMIN/RCVD: CPT | Performed by: ORTHOPAEDIC SURGERY

## 2020-07-06 PROCEDURE — 1036F TOBACCO NON-USER: CPT | Performed by: ORTHOPAEDIC SURGERY

## 2020-07-06 PROCEDURE — G8427 DOCREV CUR MEDS BY ELIG CLIN: HCPCS | Performed by: ORTHOPAEDIC SURGERY

## 2020-07-06 NOTE — PROGRESS NOTES
DIAGNOSIS:  Right intertrochanteric femur fracture, status post Gamma nail. DATE OF SURGERY:  3/18/20    HISTORY OF PRESENT ILLNESS: Ms. Jimmy Thomas 69 y.o. female  who comes in today for follow up visit. The patient rates pain 0/10. She is ambulating using rolling walker. She did have RLE weakness from stroke prior to fracture. PHYSICAL EXAMINATION:    Ht 5' 6\" (1.676 m)   Wt 156 lb 12.8 oz (71.1 kg)   BMI 25.31 kg/m²     Patient is awake, alert, and in no acute distress. She has no pain with the active or passive range of motion of the right hip. Right hip flexion 4 to 5-/5. She has intact sensation to light touch distally in bilateral legs. Bilateral legs are warm and well perfused. IMAGING:    Two views (AP and lateral) right  Hip, and AP pelvis were obtained and reviewed. Showed anatomic alignment of the intertrochanteric fracture, Gamma nail in good position, no loosening, or hardware   failure. IMPRESSION:    3 1/2 months status post right Gamma nail of intertrochanteric femur fracture. H/o stroke with RLE weakness  Atrial fibrillation      PLAN:    Continue to work on strengthening. Ice / heat prn. NSAIDs prn. Follow up prn.

## 2020-08-07 ENCOUNTER — HOSPITAL ENCOUNTER (EMERGENCY)
Age: 75
Discharge: HOME | End: 2020-08-08
Attending: EMERGENCY MEDICINE
Payer: MEDICARE

## 2020-08-07 ENCOUNTER — APPOINTMENT (OUTPATIENT)
Dept: GENERAL RADIOLOGY | Age: 75
End: 2020-08-07
Payer: MEDICARE

## 2020-08-07 LAB
A/G RATIO: 1.6 (ref 1.1–2.2)
ALBUMIN SERPL-MCNC: 3.8 G/DL (ref 3.4–5)
ALP BLD-CCNC: 78 U/L (ref 40–129)
ALT SERPL-CCNC: 19 U/L (ref 10–40)
ANION GAP SERPL CALCULATED.3IONS-SCNC: 15 MMOL/L (ref 3–16)
AST SERPL-CCNC: 16 U/L (ref 15–37)
BASOPHILS ABSOLUTE: 0 K/UL (ref 0–0.2)
BASOPHILS RELATIVE PERCENT: 0.4 %
BILIRUB SERPL-MCNC: <0.2 MG/DL (ref 0–1)
BUN BLDV-MCNC: 14 MG/DL (ref 7–20)
CALCIUM SERPL-MCNC: 9.4 MG/DL (ref 8.3–10.6)
CHLORIDE BLD-SCNC: 104 MMOL/L (ref 99–110)
CO2: 20 MMOL/L (ref 21–32)
CREAT SERPL-MCNC: 0.7 MG/DL (ref 0.6–1.2)
EOSINOPHILS ABSOLUTE: 0.4 K/UL (ref 0–0.6)
EOSINOPHILS RELATIVE PERCENT: 3.7 %
GFR AFRICAN AMERICAN: >60
GFR NON-AFRICAN AMERICAN: >60
GLOBULIN: 2.4 G/DL
GLUCOSE BLD-MCNC: 126 MG/DL (ref 70–99)
HCT VFR BLD CALC: 39.7 % (ref 36–48)
HEMOGLOBIN: 13 G/DL (ref 12–16)
LYMPHOCYTES ABSOLUTE: 2.9 K/UL (ref 1–5.1)
LYMPHOCYTES RELATIVE PERCENT: 28.5 %
MCH RBC QN AUTO: 29.9 PG (ref 26–34)
MCHC RBC AUTO-ENTMCNC: 32.9 G/DL (ref 31–36)
MCV RBC AUTO: 90.9 FL (ref 80–100)
MONOCYTES ABSOLUTE: 0.7 K/UL (ref 0–1.3)
MONOCYTES RELATIVE PERCENT: 6.8 %
NEUTROPHILS ABSOLUTE: 6.2 K/UL (ref 1.7–7.7)
NEUTROPHILS RELATIVE PERCENT: 60.6 %
PDW BLD-RTO: 14.7 % (ref 12.4–15.4)
PLATELET # BLD: 250 K/UL (ref 135–450)
PMV BLD AUTO: 7.9 FL (ref 5–10.5)
POTASSIUM SERPL-SCNC: 3.4 MMOL/L (ref 3.5–5.1)
RBC # BLD: 4.36 M/UL (ref 4–5.2)
SODIUM BLD-SCNC: 139 MMOL/L (ref 136–145)
TOTAL PROTEIN: 6.2 G/DL (ref 6.4–8.2)
TROPONIN: <0.01 NG/ML
WBC # BLD: 10.2 K/UL (ref 4–11)

## 2020-08-07 PROCEDURE — 99284 EMERGENCY DEPT VISIT MOD MDM: CPT

## 2020-08-07 PROCEDURE — 93005 ELECTROCARDIOGRAM TRACING: CPT | Performed by: EMERGENCY MEDICINE

## 2020-08-07 PROCEDURE — 84484 ASSAY OF TROPONIN QUANT: CPT

## 2020-08-07 PROCEDURE — 80053 COMPREHEN METABOLIC PANEL: CPT

## 2020-08-07 PROCEDURE — 71045 X-RAY EXAM CHEST 1 VIEW: CPT

## 2020-08-07 PROCEDURE — 85025 COMPLETE CBC W/AUTO DIFF WBC: CPT

## 2020-08-07 PROCEDURE — 2580000003 HC RX 258: Performed by: EMERGENCY MEDICINE

## 2020-08-07 RX ORDER — LISINOPRIL 20 MG/1
20 TABLET ORAL DAILY
COMMUNITY
Start: 2020-07-24

## 2020-08-07 RX ORDER — 0.9 % SODIUM CHLORIDE 0.9 %
1000 INTRAVENOUS SOLUTION INTRAVENOUS ONCE
Status: COMPLETED | OUTPATIENT
Start: 2020-08-07 | End: 2020-08-08

## 2020-08-07 RX ORDER — ALPRAZOLAM 1 MG/1
1 TABLET ORAL 2 TIMES DAILY PRN
COMMUNITY
Start: 2020-06-23

## 2020-08-07 RX ORDER — ATENOLOL 50 MG/1
50 TABLET ORAL DAILY
COMMUNITY
Start: 2020-07-24

## 2020-08-07 RX ORDER — AMLODIPINE BESYLATE 5 MG/1
5 TABLET ORAL DAILY
COMMUNITY
Start: 2020-07-24

## 2020-08-07 RX ORDER — PANTOPRAZOLE SODIUM 40 MG/1
40 TABLET, DELAYED RELEASE ORAL EVERY OTHER DAY
COMMUNITY
Start: 2020-06-19

## 2020-08-07 RX ADMIN — SODIUM CHLORIDE 1000 ML: 9 INJECTION, SOLUTION INTRAVENOUS at 23:22

## 2020-08-08 VITALS
HEART RATE: 63 BPM | SYSTOLIC BLOOD PRESSURE: 119 MMHG | RESPIRATION RATE: 21 BRPM | OXYGEN SATURATION: 98 % | TEMPERATURE: 97.7 F | DIASTOLIC BLOOD PRESSURE: 73 MMHG

## 2020-08-08 LAB
EKG ATRIAL RATE: 62 BPM
EKG DIAGNOSIS: NORMAL
EKG P AXIS: 24 DEGREES
EKG P-R INTERVAL: 158 MS
EKG Q-T INTERVAL: 454 MS
EKG QRS DURATION: 84 MS
EKG QTC CALCULATION (BAZETT): 460 MS
EKG R AXIS: 10 DEGREES
EKG T AXIS: 63 DEGREES
EKG VENTRICULAR RATE: 62 BPM

## 2020-08-08 PROCEDURE — 93010 ELECTROCARDIOGRAM REPORT: CPT | Performed by: INTERNAL MEDICINE

## 2020-08-08 ASSESSMENT — ENCOUNTER SYMPTOMS
EYE PAIN: 0
ABDOMINAL DISTENTION: 0
VOMITING: 0
PHOTOPHOBIA: 0
CHOKING: 0
FACIAL SWELLING: 0
SHORTNESS OF BREATH: 0
APNEA: 0
EYE DISCHARGE: 0
WHEEZING: 0
STRIDOR: 0
CHEST TIGHTNESS: 0
NAUSEA: 0
BACK PAIN: 0
ABDOMINAL PAIN: 0
EYE REDNESS: 0
SORE THROAT: 0
EYE ITCHING: 0
COUGH: 0

## 2020-08-08 NOTE — ED PROVIDER NOTES
[address] [facility]      Pt Name: Rosmery Do  MRN: [id number]  Kanikagfvicente [birth year]  Date of evaluation: 8/7/2020  Provider: Erick Gao MD    CHIEF COMPLAINT       Chief Complaint   Patient presents with    Loss of Consciousness       HISTORY OF PRESENT ILLNESS    Rosmery Do is a 69 y.o. female who presents to the emergency department with pre-syncope. Took her night time meds and got light-headed. When she went to restroom almost passed out. No head trauma. No LOC. Felt very weak then. Denies chest pain or SOB. States this has happened before. States when this happens it usually is from taking her BP medications and her blood pressure drops. Denies any other associated symptoms. Does endorses not drinking a lot of fluids lately as well. Nursing Notes were reviewed. Including nursing noted for FM, Surgical History, Past Medical History, Social History, vitals, and allergies; agree with all. REVIEW OF SYSTEMS       Review of Systems   Constitutional: Positive for fatigue. Negative for activity change, appetite change, chills, diaphoresis, fever and unexpected weight change. HENT: Negative for congestion, dental problem, drooling and ear discharge. Eyes: Negative for photophobia, pain, discharge, redness and itching. Respiratory: Negative for apnea, cough, choking, chest tightness, shortness of breath, wheezing and stridor. Cardiovascular: Negative for chest pain and leg swelling. Gastrointestinal: Negative for abdominal distention. Endocrine: Negative for polyphagia and polyuria. Genitourinary: Negative for vaginal bleeding, vaginal discharge and vaginal pain. Musculoskeletal: Negative for arthralgias. Neurological: Positive for light-headedness. Negative for dizziness, facial asymmetry and headaches. Hematological: Negative for adenopathy. Does not bruise/bleed easily.    Psychiatric/Behavioral: Negative for Findings: No erythema or rash. Neurological:      Mental Status: She is alert and oriented to person, place, and time. She is not disoriented. Cranial Nerves: No cranial nerve deficit. Motor: No atrophy or abnormal muscle tone. Coordination: Coordination normal.   Psychiatric:         Behavior: Behavior normal.         Thought Content: Thought content normal.         DIAGNOSTIC RESULTS     EKG: All EKG's are interpreted by the Emergency Department Physician who either signs or Co-signs this chart in the absence of acardiologist.    EKG NSR NAD nonspecific ekg changes no ectopy     RADIOLOGY:   Non-plain film images such as CT, Ultrasoundand MRI are read by the radiologist. Plain radiographic images are visualized and preliminarily interpreted by the emergency physician with the below findings:    CXR rearguing     ED BEDSIDE ULTRASOUND:   Performed by ED Physician - none    LABS:  Labs Reviewed   COMPREHENSIVE METABOLIC PANEL - Abnormal; Notable for the following components:       Result Value    Potassium 3.4 (*)     CO2 20 (*)     Glucose 126 (*)     Total Protein 6.2 (*)     All other components within normal limits    Narrative:     Performed at:  [address] [address]   Phone [phone]   CBC WITH AUTO DIFFERENTIAL    Narrative:     Performed at:  [address] [address]   Phone [phone]   TROPONIN    Narrative:     Performed at:  [address] [address]   Phone [phone]   URINE RT REFLEX TO CULTURE       All other labs were withinnormal range or not returned as of this dictation.     EMERGENCY DEPARTMENT COURSE and DIFFERENTIAL DIAGNOSIS/MDM:     PMH, Surgical Hx, FH, Social Hx reviewed by myself (ETOH usage, Tobacco usage, Drug usage reviewed by myself, no pertinent Hx)- No Pertinent Hx DISPOSITION/PLAN   DISPOSITION Decision To Discharge 08/08/2020 12:19:47 AM    PATIENT REFERRED TO:  Turin Jessenia  [phone]    Call   As needed, If symptoms worsen      DISCHARGE MEDICATIONS:  Discharge Medication List as of 8/8/2020 12:22 AM             (Please note that portions ofthis note were completed with a voice recognition program.  Efforts were made to edit the dictations but occasionally words are mis-transcribed.)    Joseph Villavicencio MD(electronically signed)  Attending Emergency Physician       Joseph Villavicencio MD  08/08/20 5654

## 2020-08-08 NOTE — ED PROVIDER NOTES
**EVALUATED BY ADVANCED PRACTICE PROVIDER**        [facility] ENCOUNTER      Pt Name: Desi Gimenez  FACUNDO:[id number]  Betzy [birth year]  Date of evaluation: 8/7/2020  Provider: Juliette Dubose PA-C      Chief Complaint:    Chief Complaint   Patient presents with    Loss of Consciousness       Nursing Notes, Past Medical Hx, Past Surgical Hx, Social Hx, Allergies, and Family Hx were all reviewed and agreed with or any disagreements were addressed in the HPI.    HPI:  (Location, Duration, Timing, Severity, Quality, Assoc Sx, Context, Modifying factors)  This is a  69 y.o. female complaint of near syncopal episode. She states she took her nighttime medication which includes her blood pressure medication and her Seroquel. She used to go straight to bed but she was up and talking with some company she had. And when she got up to use the restroom she felt very lightheaded and was almost going to pass out she cannot was against the wall, sat herself down. Denies hitting her head. There was no loss of consciousness. Just felt very weak. Denies chest pain, no abdominal pain, no headaches, no neck or back pain. No shortness of breath. Unsure if her blood pressure medication was because her blood pressure to drop too low. Denies any extremity weakness. No other complaints.       PastMedical/Surgical History:      Diagnosis Date    Arthritis     Back pain     Cancer ([address]     breast, Left Lumpectomy and radiation      Diabetes     High blood pressure     Hyperlipidemia     Seizure ([address]     10 yrs ago     SVT (supraventricular tachycardia) (HCC)     Type II or unspecified type diabetes mellitus without mention of complication, not stated as uncontrolled          Procedure Laterality Date    BACK SURGERY      BREAST LUMPECTOMY Left     CARPAL TUNNEL RELEASE      left    COLONOSCOPY      HIP FRACTURE SURGERY Right 3/18/2020    RIGHT HIP GAMMA NAIL performed by Virginia Anderson MD at [address] Right 3/11/2019    PHACOEMULSIFICATION WITH INTRAOCULAR LENS IMPLANT  performed by Anushka Brewer MD at [address] EXTRACTION Left 3/25/2019    PHACOEMULSIFICATION WITH INTRAOCULAR LENS IMPLANT  performed by Anushka Brewer MD at [address] [facility] Left     TONSILLECTOMY         Medications:  Previous Medications    ALPRAZOLAM (XANAX) 0.5 MG TABLET    Take 0.5 mg by mouth 2 times daily. AMLODIPINE (NORVASC) 10 MG TABLET    Take 5 mg by mouth daily    APIXABAN (ELIQUIS) 5 MG TABS TABLET    Take 5 mg by mouth 2 times daily    ATENOLOL (TENORMIN) 50 MG TABLET    Take 50 mg by mouth daily    ATORVASTATIN (LIPITOR) 10 MG TABLET    Take 10 mg by mouth nightly    DEXLANSOPRAZOLE (DEXILANT) 60 MG CPDR    Take 60 mg by mouth daily. DULOXETINE (CYMBALTA) 60 MG EXTENDED RELEASE CAPSULE    Take 60 mg by mouth 2 times daily     LISINOPRIL (PRINIVIL;ZESTRIL) 20 MG TABLET    Take 20 mg by mouth daily    METFORMIN (GLUCOPHAGE) 1000 MG TABLET    Take 1 tablet by mouth 2 times daily (with meals)    PANTOPRAZOLE (PROTONIX) 40 MG TABLET    TAKE 1 TABLET EVERY DAY    QUETIAPINE (SEROQUEL) 100 MG TABLET    Take 100 mg by mouth nightly as needed (insomnia)     SEMAGLUTIDE (OZEMPIC, 0.25 OR 0.5 MG/DOSE, SC)    Inject 0.25 mg into the skin once a week     SUMATRIPTAN (IMITREX) 50 MG TABLET    Take 1 tablet by mouth once as needed for Migraine         Review of Systems:  Review of Systems   Constitutional: Negative for chills and fever. HENT: Negative for congestion, facial swelling and sore throat. Eyes: Negative for discharge and redness. Respiratory: Negative for apnea, choking and shortness of breath. Cardiovascular: Negative for chest pain. Gastrointestinal: Negative for abdominal pain, nausea and vomiting. Genitourinary: Negative for dysuria. Musculoskeletal: Negative for back pain, neck pain and neck stiffness. Neurological: Positive for syncope. Negative for dizziness, tremors, seizures, weakness and headaches. All other systems reviewed and are negative. Positives and Pertinent negatives as per HPI. Except as noted above in the ROS, problem specific ROS was completed and is negative. Physical Exam:  Physical Exam  Vitals signs and nursing note reviewed. Constitutional:       Appearance: She is well-developed. She is not diaphoretic. HENT:      Head: Normocephalic and atraumatic. Nose: Nose normal.      Mouth/Throat:      Mouth: Mucous membranes are moist.      Pharynx: Oropharynx is clear. Eyes:      General:         Right eye: No discharge. Left eye: No discharge. Neck:      Musculoskeletal: Normal range of motion and neck supple. Cardiovascular:      Rate and Rhythm: Normal rate and regular rhythm. Heart sounds: Normal heart sounds. No murmur. No friction rub. No gallop. Pulmonary:      Effort: Pulmonary effort is normal. No respiratory distress. Breath sounds: Normal breath sounds. No wheezing or rales. Chest:      Chest wall: No tenderness. Abdominal:      General: Abdomen is flat. Bowel sounds are normal. There is no distension. Palpations: Abdomen is soft. There is no mass. Tenderness: There is no abdominal tenderness. Musculoskeletal: Normal range of motion. Skin:     General: Skin is warm and dry. Neurological:      Mental Status: She is alert and oriented to person, place, and time.    Psychiatric:         Behavior: Behavior normal.         MEDICAL DECISION MAKING    Vitals:    Vitals:    08/07/20 2315 08/07/20 2330 08/07/20 2345 08/08/20 0000   BP: 96/67 101/61 108/69 119/73   Pulse: 78 63 63 63   Resp: 18 20 19 21   Temp:       TempSrc:       SpO2: 96% 95% 95% 98%       LABS:  Labs Reviewed   COMPREHENSIVE METABOLIC PANEL - Abnormal; Notable for the following components: Result Value    Potassium 3.4 (*)     CO2 20 (*)     Glucose 126 (*)     Total Protein 6.2 (*)     All other components within normal limits    Narrative:     Performed at:  [facility]  [address] [place], [place] [address]   Phone [phone]   CBC WITH AUTO DIFFERENTIAL    Narrative:     Performed at:  [facility]  [address] [place] [address]   Phone [phone]   TROPONIN    Narrative:     Performed at:  [facility]  [address] [place], [place] [address]   Phone [phone]   URINE RT REFLEX TO CULTURE        Remainder of labs reviewed and werenegative at this time or not returned at the time of this note. RADIOLOGY:   Non-plain film images such as CT, Ultrasound and MRI are read by the radiologist. Zora Watters PA-C have directly visualized the radiologic plain film image(s) with the below findings:        Interpretation per the Radiologist below, if available at the time of this note:    XR CHEST PORTABLE   Final Result   Stable portable study. Xr Chest Portable    Result Date: 8/7/2020  EXAMINATION: ONE XRAY VIEW OF THE CHEST 8/7/2020 11:18 pm COMPARISON: 03/18/2020 HISTORY: ORDERING SYSTEM PROVIDED HISTORY: SOB TECHNOLOGIST PROVIDED HISTORY: Reason for exam:->SOB Reason for Exam: sob FINDINGS: Radiopaque device projects over the left chest.  No acute bony abnormality. Surgical clips project over the mid chest and left axilla. The heart size and mediastinal contours are stable. The lungs are clear. Stable portable study. MEDICAL DECISION MAKING / ED COURSE:      PROCEDURES:   Procedures    None    Patient was given:  Medications   0.9 % sodium chloride bolus (1,000 mLs Intravenous New Bag 8/7/20 5818)       Emergency room course: Patient on exam pupils equal round and reactive to light extraocular movement is intact. Throat is clear.   Neck is supple full range of motion without tenderness. Cardiovascular regular rhythm, lungs are clear. No wheeze, rales or rhonchi. Chest wall show no tenderness with palpation. Abdomen is soft nontender. Nondistended. Full range of motion all extremities. Bilateral lower extremities show no edema. She has good strength against resisted plantar and dorsiflexion. Neurologically no other motor or sensory deficit noted. No facial drooping no slurred speech.  strength 5+ equal bilaterally. She is ambulatory with no difficulty. Lab result from today shows:  CBC with a white count of 10.2, RBC 4.36, hemoglobin 13.0 hematocrit 39.7. CMP shows sodium 139, potassium 3.4, chloride 104 BUN of 14 creatinine 0.7. Transaminases normal.      Troponin less than 0.01    Portable chest shows stable portable study. After second liter normal saline patient states he felt much better. Blood pressure has improved greatly. Last reading was 119/73. Discussed with patient discharge plan. And she states she is ready to go home. She feels much better. If her blood pressure is less than 903 systolically she is not to take her blood pressure medication. If it is more than 140 in the morning she is to take her blood pressure medication she was instructed to keep a daily log of her blood pressure and take that with her to her next primary care appointment. Discussed any possible changes that she might need to make or adjustment to her medication. She will be discharged stable condition. The patient tolerated their visit well. I evaluated the patient. The physician was available for consultation as needed. The patient and / or the family were informed of the results of any tests, a time was given to answer questions, a plan was proposed and they agreed with plan. CLINICAL IMPRESSION:  1.  Hypotension, unspecified hypotension type        DISPOSITION Decision To Discharge 08/08/2020 12:19:47 AM      PATIENT REFERRED TO:  Miguelnagel Cintron  [phone]    Call   As needed, If symptoms worsen      DISCHARGE MEDICATIONS:  New Prescriptions    No medications on file       DISCONTINUED MEDICATIONS:  Discontinued Medications    ASPIRIN 325 MG EC TABLET    Take 1 tablet by mouth daily              (Please note the MDM and HPI sections of this note were completed with a voice recognition program.  Efforts were made to edit the dictations but occasionally words are mis-transcribed.)    Electronically signed, Virginia Sanders PA-C,          Virginia Sanders PA-C  08/08/20 0023

## 2020-08-08 NOTE — ED TRIAGE NOTES
Pt comes to ER via EMS with syncopal episode at home. Pt states got up to go to bathroom and went down. Pt denies pain.

## 2020-08-08 NOTE — ED NOTES
Bed: [facility]  Expected date:   Expected time:   Means of arrival: [facility]  Comments:  Hypotensive      Jai Westbrook RN  08/07/20 3970

## 2021-02-25 ENCOUNTER — IMMUNIZATION (OUTPATIENT)
Dept: PRIMARY CARE CLINIC | Age: 76
End: 2021-02-25
Payer: MEDICARE

## 2021-02-25 PROCEDURE — 0001A COVID-19, PFIZER VACCINE 30MCG/0.3ML DOSE: CPT | Performed by: FAMILY MEDICINE

## 2021-02-25 PROCEDURE — 91300 COVID-19, PFIZER VACCINE 30MCG/0.3ML DOSE: CPT | Performed by: FAMILY MEDICINE

## 2021-03-18 ENCOUNTER — IMMUNIZATION (OUTPATIENT)
Dept: PRIMARY CARE CLINIC | Age: 76
End: 2021-03-18
Payer: MEDICARE

## 2021-03-18 PROCEDURE — 91300 COVID-19, PFIZER VACCINE 30MCG/0.3ML DOSE: CPT | Performed by: NURSE PRACTITIONER

## 2021-03-18 PROCEDURE — 0002A COVID-19, PFIZER VACCINE 30MCG/0.3ML DOSE: CPT | Performed by: NURSE PRACTITIONER

## 2021-05-07 ENCOUNTER — TELEPHONE (OUTPATIENT)
Dept: ORTHOPEDIC SURGERY | Age: 76
End: 2021-05-07

## 2021-05-07 ENCOUNTER — HOSPITAL ENCOUNTER (EMERGENCY)
Age: 76
Discharge: HOME | End: 2021-05-07
Attending: EMERGENCY MEDICINE
Payer: MEDICARE

## 2021-05-07 ENCOUNTER — APPOINTMENT (OUTPATIENT)
Dept: GENERAL RADIOLOGY | Age: 76
End: 2021-05-07
Payer: MEDICARE

## 2021-05-07 VITALS
BODY MASS INDEX: 29.35 KG/M2 | HEART RATE: 62 BPM | DIASTOLIC BLOOD PRESSURE: 66 MMHG | RESPIRATION RATE: 16 BRPM | OXYGEN SATURATION: 98 % | TEMPERATURE: 98 F | WEIGHT: 176.15 LBS | HEIGHT: 65 IN | SYSTOLIC BLOOD PRESSURE: 119 MMHG

## 2021-05-07 DIAGNOSIS — S62.306A CLOSED NONDISPLACED FRACTURE OF FIFTH METACARPAL BONE OF RIGHT HAND, UNSPECIFIED PORTION OF METACARPAL, INITIAL ENCOUNTER: ICD-10-CM

## 2021-05-07 PROCEDURE — 73130 X-RAY EXAM OF HAND: CPT

## 2021-05-07 PROCEDURE — 29515 APPLICATION SHORT LEG SPLINT: CPT

## 2021-05-07 PROCEDURE — 99283 EMERGENCY DEPT VISIT LOW MDM: CPT

## 2021-05-07 ASSESSMENT — PAIN SCALES - GENERAL: PAINLEVEL_OUTOF10: 2

## 2021-05-07 ASSESSMENT — PAIN DESCRIPTION - DESCRIPTORS: DESCRIPTORS: ACHING

## 2021-05-07 NOTE — TELEPHONE ENCOUNTER
Called and LM for pt to call back. Pt can be scheduled on Monday the 10th at 1:15 pm this is at the [facility].

## 2021-05-07 NOTE — ED TRIAGE NOTES
Pt. Was on her knees yesterday and fell forward into box she was taking stuff out of and hurt her right hand, she thinks she may have dislocated her 5th finger. Swelling and bruising noted right hand. Ice offered to pt., she refused.

## 2021-05-07 NOTE — TELEPHONE ENCOUNTER
Appointment Request     Patient requesting earlier appointment: Yes  Appointment offered to patient: N/A  Patient Contact Number: [phone]      Patient needs to be worked in has fractured fifth metacarpal bone right hand.

## 2021-05-08 NOTE — ED PROVIDER NOTES
[address] [facility]      CHIEF COMPLAINT  Hand Injury (right hand injury last night when fell foward from knees, thinks 5th finger may have been dislocated)       HISTORY OF PRESENT ILLNESS  Gui Lugo is a 70 y.o. female  who presents to the ED for evaluation of finger injury. Patient reports she was on her knees pushing on a box when she got her right pinky finger caught against a surface and the pinky bent backwards. Says she immediately had pain. Says she was hoping it would get better on its own. However, when she went to physical therapy this morning, she was told that she needs to go to the ED. Says she is able to move all fingers but does have pain with movement of pinky. Denies shaving numbness or tingling sensation. Denies any other injuries. No other complaints, modifying factors or associated symptoms. I have reviewed the following from the nursing documentation.     Past Medical History:   Diagnosis Date    Arthritis     Back pain     Cancer ([address]     breast, Left Lumpectomy and radiation      Cerebral artery occlusion with cerebral infarction ([address]     Diabetes     High blood pressure     Hyperlipidemia     Seizure ([address]     10 yrs ago     SVT (supraventricular tachycardia) (HCC)     Type II or unspecified type diabetes mellitus without mention of complication, not stated as uncontrolled      Past Surgical History:   Procedure Laterality Date    BACK SURGERY      BREAST LUMPECTOMY Left     CARPAL TUNNEL RELEASE      left    COLONOSCOPY      HIP FRACTURE SURGERY Right 3/18/2020    RIGHT H[facility] GAM[facility] NAIL [facility] Right 3/11/2019    PHACOEMULSIFICATION WITH INTRAOCULAR LENS IMPLANT  performed by Milla Saldana MD at [place] [id number] Left 3/25/2019    PHACOEMULSIFICATION WITH INTRAOCULAR LENS IMPLANT  performed by Renny Kinney Beena Scott MD at [address] [facility] Left     TONSILLECTOMY       Family History   Problem Relation Age of Onset    Heart Disease Mother     Cancer Father      Social History     Socioeconomic History    Marital status:      Spouse name: Not on file    Number of children: Not on file    Years of education: Not on file    Highest education level: Not on file   Occupational History    Not on file   Social Needs    Financial resource strain: Not on file    Food insecurity     Worry: Not on file     Inability: Not on file    Transportation needs     Medical: Not on file     Non-medical: Not on file   Tobacco Use    Smoking status: Former Smoker    Smokeless tobacco: Never Used    Tobacco comment: quit 30 yrs ago   Substance and Sexual Activity    Alcohol use: No    Drug use: No    Sexual activity: Not on file   Lifestyle    Physical activity     Days per week: Not on file     Minutes per session: Not on file    Stress: Not on file   Relationships    Social connections     Talks on phone: Not on file     Gets together: Not on file     Attends Yazidi service: Not on file     Active member of club or organization: Not on file     Attends meetings of clubs or organizations: Not on file     Relationship status: Not on file    Intimate partner violence     Fear of current or ex partner: Not on file     Emotionally abused: Not on file     Physically abused: Not on file     Forced sexual activity: Not on file   Other Topics Concern    Not on file   Social History Narrative    Not on file     No current facility-administered medications for this encounter. Current Outpatient Medications   Medication Sig Dispense Refill    ALPRAZolam (XANAX) 0.5 MG tablet Take 0.5 mg by mouth 2 times daily.       amLODIPine (NORVASC) 10 MG tablet Take 5 mg by mouth daily      apixaban (ELIQUIS) 5 MG TABS tablet Take 5 mg by mouth 2 times daily      atenolol (TENORMIN) 50 MG tablet Take 50 mg by mouth daily      lisinopril (PRINIVIL;ZESTRIL) 20 MG tablet Take 20 mg by mouth daily      pantoprazole (PROTONIX) 40 MG tablet TAKE 1 TABLET EVERY DAY      atorvastatin (LIPITOR) 10 MG tablet Take 10 mg by mouth nightly      metFORMIN (GLUCOPHAGE) 1000 MG tablet Take 1 tablet by mouth 2 times daily (with meals) 60 tablet 0    QUEtiapine (SEROQUEL) 100 MG tablet Take 100 mg by mouth nightly as needed (insomnia)       Dexlansoprazole (DEXILANT) 60 MG CPDR Take 60 mg by mouth daily. (Patient taking differently: Take 60 mg by mouth daily as needed ) 30 capsule 5    DULoxetine (CYMBALTA) 60 MG extended release capsule Take 60 mg by mouth 2 times daily        Allergies   Allergen Reactions    Lexapro [Escitalopram Oxalate] Other (See Comments)     Makes her suicidal    Wellbutrin [Bupropion] Other (See Comments)     Makes her suicidal       REVIEW OF SYSTEMS  10 systems reviewed, pertinent positives per HPI otherwise noted to be negative. PHYSICAL EXAM  /66   Pulse 62   Temp 98 °F (36.7 °C) (Oral)   Resp 16   Ht 5' 5\" (1.651 m)   Wt 176 lb 2.4 oz (79.9 kg)   SpO2 98%   BMI 29.31 kg/m²    GENERAL APPEARANCE: Awake and alert. No acute distress. HENT: Normocephalic. Atraumatic. PERRL. EOMI. No facial droop. HEART/CHEST: RRR. LUNGS: Respirations unlabored. Speaking comfortably in full sentences. ABDOMEN:Soft, non-distended abdomen. Non tender to palpation. No guarding. No rebound. EXTREMITIES: ecchymosis of the right 5th metacarpal. Moving all fingers bilaterally. Flexion and extension intact at PIP and DIP 2+ radial pulses bilaterally. Sensation intact over radial/median/ulnar n distribution bilaterally. SKIN: Warm and dry. No acute rashes. NEUROLOGICAL: Alert and oriented. No gross facial drooping. Answering questions appropriately. Moving all extremities. PSYCHIATRIC: Pleasant. Normal mood and affect. LABS  No results found for this visit on 05/07/21.     I have reviewed all labs for this visit. RADIOLOGY  Xr Hand Right (min 3 Views)    Result Date: 5/7/2021  EXAMINATION: THREE XRAY VIEWS OF THE RIGHT HAND 5/7/2021 12:46 pm COMPARISON: 08/31/2015 HISTORY: ORDERING SYSTEM PROVIDED HISTORY: pain TECHNOLOGIST PROVIDED HISTORY: Reason for exam:->pain Reason for Exam: (right hand injury last night when fell foward from knees, pain distal 5th MC right hand with swelling and bruising Acuity: Acute Type of Exam: Initial FINDINGS: Disruption of the cortex in distal 5th metacarpal on the lateral view. Anatomic alignment. No other fracture identified. Nondisplaced fracture distal 5th metacarpal     ED COURSE/MDM  Patient seen and evaluated. At presentation, patient was awake, alert, in no acute distress, satting well on room air, and hemodynamically stable. There was ecchymosis on the right 5th MC. Skin intact. She was able to move all fingers and has intact sensation over radial/median/ulnar n distribution. Ddx includes fracture, dislocation, among others. XR of the right hand obtained which showed nondisplaced fracture of the distal 5th metacarpal. This was discussed with patient. She was offered pain medication which she declined. She was placed in a ulnar gutter splint. She was provided with referral for orthopedics She was discharged home with strict return precautions. Procedure Note - Splint Application:  Orthopedic ulnar gutter splint was applied using orthoglass to the right forearm. Otf Roche tolerated the procedure well with no acute complications. Otf Roche's distal neurovascular exam remains unchanged status post splint application. Pt was seen during the [place] 19 pandemic. Appropriate PPE worn by ME during patient encounters. Pt seen during a time with constrained hospital bed capacity and other potential inpatient and outpatient resources were constrained due to the viral pandemic.      During the patient's ED course, the patient was given:  Medications - No data to display     CLINICAL IMPRESSION  1. Closed nondisplaced fracture of fifth metacarpal bone of right hand, unspecified portion of metacarpal, initial encounter        Blood pressure 119/66, pulse 62, temperature 98 °F (36.7 °C), temperature source Oral, resp. rate 16, height 5' 5\" (1.651 m), weight 176 lb 2.4 oz (79.9 kg), SpO2 98 %. DISPOSITION  Prabha Momin was discharged home in stable condition. Patient was given scripts for the following medications. I counseled patient how to take these medications. Discharge Medication List as of 5/7/2021  1:47 PM          Follow-up with:  No follow-up provider specified. DISCLAIMER: This chart was created using Dragon dictation software. Efforts were made by me to ensure accuracy, however some errors may be present due to limitations of this technology and occasionally words are not transcribed correctly.         Mike Adams MD  05/08/21 [id number]

## 2021-05-11 NOTE — TELEPHONE ENCOUNTER
Called and left a VM asking the patient to call back to schedule an appt with Dr. Yessi Zavala. no rash

## 2022-07-06 ENCOUNTER — APPOINTMENT (OUTPATIENT)
Dept: CT IMAGING | Age: 77
End: 2022-07-06
Payer: MEDICARE

## 2022-07-06 ENCOUNTER — HOSPITAL ENCOUNTER (OUTPATIENT)
Age: 77
Setting detail: OBSERVATION
Discharge: HOME | End: 2022-07-07
Attending: EMERGENCY MEDICINE | Admitting: INTERNAL MEDICINE
Payer: MEDICARE

## 2022-07-06 DIAGNOSIS — R47.01 APHASIA: ICD-10-CM

## 2022-07-06 DIAGNOSIS — G45.9 TIA (TRANSIENT ISCHEMIC ATTACK): Primary | ICD-10-CM

## 2022-07-06 DIAGNOSIS — R40.4 TRANSIENT ALTERATION OF AWARENESS: ICD-10-CM

## 2022-07-06 PROBLEM — R47.81 SLURRED SPEECH: Status: ACTIVE | Noted: 2022-07-06

## 2022-07-06 LAB
A/G RATIO: 1.4 (ref 1.1–2.2)
ALBUMIN SERPL-MCNC: 4.1 G/DL (ref 3.4–5)
ALP BLD-CCNC: 63 U/L (ref 40–129)
ALT SERPL-CCNC: 13 U/L (ref 10–40)
ANION GAP SERPL CALCULATED.3IONS-SCNC: 15 MMOL/L (ref 3–16)
AST SERPL-CCNC: 19 U/L (ref 15–37)
BACTERIA: ABNORMAL /HPF
BASOPHILS ABSOLUTE: 0.1 K/UL (ref 0–0.2)
BASOPHILS RELATIVE PERCENT: 0.7 %
BILIRUB SERPL-MCNC: 0.3 MG/DL (ref 0–1)
BILIRUBIN URINE: NEGATIVE
BLOOD, URINE: NEGATIVE
BUN BLDV-MCNC: 20 MG/DL (ref 7–20)
CALCIUM SERPL-MCNC: 9.6 MG/DL (ref 8.3–10.6)
CHLORIDE BLD-SCNC: 97 MMOL/L (ref 99–110)
CLARITY: CLEAR
CO2: 23 MMOL/L (ref 21–32)
COLOR: YELLOW
CREAT SERPL-MCNC: 0.7 MG/DL (ref 0.6–1.2)
EOSINOPHILS ABSOLUTE: 0.3 K/UL (ref 0–0.6)
EOSINOPHILS RELATIVE PERCENT: 3.5 %
EPITHELIAL CELLS, UA: 2 /HPF (ref 0–5)
GFR AFRICAN AMERICAN: >60
GFR NON-AFRICAN AMERICAN: >60
GLUCOSE BLD-MCNC: 149 MG/DL (ref 70–99)
GLUCOSE URINE: NEGATIVE MG/DL
HCT VFR BLD CALC: 38.5 % (ref 36–48)
HEMOGLOBIN: 13.1 G/DL (ref 12–16)
HYALINE CASTS: 0 /LPF (ref 0–8)
KETONES, URINE: NEGATIVE MG/DL
LEUKOCYTE ESTERASE, URINE: ABNORMAL
LYMPHOCYTES ABSOLUTE: 1.4 K/UL (ref 1–5.1)
LYMPHOCYTES RELATIVE PERCENT: 18.8 %
MCH RBC QN AUTO: 31.6 PG (ref 26–34)
MCHC RBC AUTO-ENTMCNC: 34.1 G/DL (ref 31–36)
MCV RBC AUTO: 92.5 FL (ref 80–100)
MICROSCOPIC EXAMINATION: YES
MONOCYTES ABSOLUTE: 0.7 K/UL (ref 0–1.3)
MONOCYTES RELATIVE PERCENT: 8.5 %
NEUTROPHILS ABSOLUTE: 5.3 K/UL (ref 1.7–7.7)
NEUTROPHILS RELATIVE PERCENT: 68.5 %
NITRITE, URINE: NEGATIVE
PDW BLD-RTO: 13.3 % (ref 12.4–15.4)
PH UA: 6 (ref 5–8)
PLATELET # BLD: 245 K/UL (ref 135–450)
PMV BLD AUTO: 7.9 FL (ref 5–10.5)
POTASSIUM REFLEX MAGNESIUM: 4.1 MMOL/L (ref 3.5–5.1)
PROTEIN UA: NEGATIVE MG/DL
RBC # BLD: 4.16 M/UL (ref 4–5.2)
RBC UA: 0 /HPF (ref 0–4)
SODIUM BLD-SCNC: 135 MMOL/L (ref 136–145)
SPECIFIC GRAVITY UA: 1.03 (ref 1–1.03)
TOTAL PROTEIN: 7 G/DL (ref 6.4–8.2)
TROPONIN: <0.01 NG/ML
URINE REFLEX TO CULTURE: YES
URINE TYPE: ABNORMAL
UROBILINOGEN, URINE: 0.2 E.U./DL
WBC # BLD: 7.7 K/UL (ref 4–11)
WBC UA: 38 /HPF (ref 0–5)

## 2022-07-06 PROCEDURE — 99285 EMERGENCY DEPT VISIT HI MDM: CPT

## 2022-07-06 PROCEDURE — 6360000004 HC RX CONTRAST MEDICATION: Performed by: EMERGENCY MEDICINE

## 2022-07-06 PROCEDURE — 70450 CT HEAD/BRAIN W/O DYE: CPT

## 2022-07-06 PROCEDURE — 93005 ELECTROCARDIOGRAM TRACING: CPT | Performed by: EMERGENCY MEDICINE

## 2022-07-06 PROCEDURE — 81001 URINALYSIS AUTO W/SCOPE: CPT

## 2022-07-06 PROCEDURE — 36415 COLL VENOUS BLD VENIPUNCTURE: CPT

## 2022-07-06 PROCEDURE — 70496 CT ANGIOGRAPHY HEAD: CPT

## 2022-07-06 PROCEDURE — G0378 HOSPITAL OBSERVATION PER HR: HCPCS

## 2022-07-06 PROCEDURE — 6370000000 HC RX 637 (ALT 250 FOR IP): Performed by: INTERNAL MEDICINE

## 2022-07-06 PROCEDURE — 83036 HEMOGLOBIN GLYCOSYLATED A1C: CPT

## 2022-07-06 PROCEDURE — 80053 COMPREHEN METABOLIC PANEL: CPT

## 2022-07-06 PROCEDURE — 6370000000 HC RX 637 (ALT 250 FOR IP): Performed by: EMERGENCY MEDICINE

## 2022-07-06 PROCEDURE — 85025 COMPLETE CBC W/AUTO DIFF WBC: CPT

## 2022-07-06 PROCEDURE — 80061 LIPID PANEL: CPT

## 2022-07-06 PROCEDURE — 87086 URINE CULTURE/COLONY COUNT: CPT

## 2022-07-06 PROCEDURE — 84484 ASSAY OF TROPONIN QUANT: CPT

## 2022-07-06 RX ORDER — ALPRAZOLAM 0.5 MG/1
0.5 TABLET ORAL 2 TIMES DAILY PRN
Status: DISCONTINUED | OUTPATIENT
Start: 2022-07-06 | End: 2022-07-07 | Stop reason: HOSPADM

## 2022-07-06 RX ORDER — DULOXETIN HYDROCHLORIDE 60 MG/1
60 CAPSULE, DELAYED RELEASE ORAL 2 TIMES DAILY
Status: DISCONTINUED | OUTPATIENT
Start: 2022-07-06 | End: 2022-07-07 | Stop reason: HOSPADM

## 2022-07-06 RX ORDER — ATENOLOL 50 MG/1
50 TABLET ORAL DAILY
Status: DISCONTINUED | OUTPATIENT
Start: 2022-07-07 | End: 2022-07-07 | Stop reason: HOSPADM

## 2022-07-06 RX ORDER — AMLODIPINE BESYLATE 5 MG/1
5 TABLET ORAL DAILY
Status: DISCONTINUED | OUTPATIENT
Start: 2022-07-07 | End: 2022-07-07 | Stop reason: HOSPADM

## 2022-07-06 RX ORDER — ONDANSETRON 4 MG/1
4 TABLET, ORALLY DISINTEGRATING ORAL EVERY 8 HOURS PRN
Status: DISCONTINUED | OUTPATIENT
Start: 2022-07-06 | End: 2022-07-07 | Stop reason: HOSPADM

## 2022-07-06 RX ORDER — ASPIRIN 325 MG
325 TABLET ORAL ONCE
Status: COMPLETED | OUTPATIENT
Start: 2022-07-06 | End: 2022-07-06

## 2022-07-06 RX ORDER — QUETIAPINE FUMARATE 100 MG/1
100 TABLET, FILM COATED ORAL NIGHTLY PRN
Status: DISCONTINUED | OUTPATIENT
Start: 2022-07-06 | End: 2022-07-07 | Stop reason: HOSPADM

## 2022-07-06 RX ORDER — ATORVASTATIN CALCIUM 10 MG/1
10 TABLET, FILM COATED ORAL NIGHTLY
Status: DISCONTINUED | OUTPATIENT
Start: 2022-07-06 | End: 2022-07-07 | Stop reason: HOSPADM

## 2022-07-06 RX ORDER — PANTOPRAZOLE SODIUM 40 MG/1
40 TABLET, DELAYED RELEASE ORAL
Status: DISCONTINUED | OUTPATIENT
Start: 2022-07-07 | End: 2022-07-07 | Stop reason: HOSPADM

## 2022-07-06 RX ORDER — DIPHENHYDRAMINE HCL 25 MG
25 TABLET ORAL
Status: COMPLETED | OUTPATIENT
Start: 2022-07-06 | End: 2022-07-06

## 2022-07-06 RX ORDER — LISINOPRIL 20 MG/1
20 TABLET ORAL DAILY
Status: DISCONTINUED | OUTPATIENT
Start: 2022-07-07 | End: 2022-07-07 | Stop reason: HOSPADM

## 2022-07-06 RX ORDER — ONDANSETRON 2 MG/ML
4 INJECTION INTRAMUSCULAR; INTRAVENOUS EVERY 6 HOURS PRN
Status: DISCONTINUED | OUTPATIENT
Start: 2022-07-06 | End: 2022-07-07 | Stop reason: HOSPADM

## 2022-07-06 RX ADMIN — DULOXETINE HYDROCHLORIDE 60 MG: 60 CAPSULE, DELAYED RELEASE ORAL at 22:43

## 2022-07-06 RX ADMIN — ASPIRIN 325 MG ORAL TABLET 325 MG: 325 PILL ORAL at 19:07

## 2022-07-06 RX ADMIN — DIPHENHYDRAMINE HCL 25 MG: 25 TABLET ORAL at 19:24

## 2022-07-06 RX ADMIN — APIXABAN 5 MG: 5 TABLET, FILM COATED ORAL at 22:43

## 2022-07-06 RX ADMIN — ATORVASTATIN CALCIUM 10 MG: 10 TABLET, FILM COATED ORAL at 22:43

## 2022-07-06 RX ADMIN — IOPAMIDOL 75 ML: 755 INJECTION, SOLUTION INTRAVENOUS at 18:01

## 2022-07-06 RX ADMIN — QUETIAPINE FUMARATE 100 MG: 100 TABLET ORAL at 22:43

## 2022-07-06 ASSESSMENT — ENCOUNTER SYMPTOMS
SORE THROAT: 0
VOMITING: 0
DIARRHEA: 0
ALLERGIC/IMMUNOLOGIC NEGATIVE: 1
RESPIRATORY NEGATIVE: 1
SHORTNESS OF BREATH: 0
ABDOMINAL PAIN: 0
NAUSEA: 0
EYES NEGATIVE: 1
ABDOMINAL DISTENTION: 0
CONSTIPATION: 0

## 2022-07-06 ASSESSMENT — LIFESTYLE VARIABLES: HOW OFTEN DO YOU HAVE A DRINK CONTAINING ALCOHOL: NEVER

## 2022-07-06 ASSESSMENT — PAIN - FUNCTIONAL ASSESSMENT: PAIN_FUNCTIONAL_ASSESSMENT: NONE - DENIES PAIN

## 2022-07-06 NOTE — ED TRIAGE NOTES
arrived ems from a dr appointment, she became confused unable to aswer questions, also had a hearrived ems at a dr appointment and became confused unable to aswer questions, had a headache h/o stroke; LNW about 2:30.

## 2022-07-06 NOTE — ED PROVIDER NOTES
[address] [facility]      Pt Name: Henry Phillip  MRN: [id number]  [place] 4/80/1894  Date ofevaluation: 7/6/2022  Provider: Rossy Oliver MD    CHIEF COMPLAINT       Chief Complaint   Patient presents with    Altered Mental Status     arrived ems at a dr appointment and became confused unable to aswer questions, has a headache h/o stroke; LNW about 2:30    Insect Bite     generalized itching         HISTORY OF PRESENT ILLNESS   (Location/Symptom, Timing/Onset,Context/Setting, Quality, Duration, Modifying Factors, Severity)  Note limiting factors. Henry Phillip is a 78 y.o. female  who  has a past medical history of Arthritis, Back pain, Cancer ([address] Cerebral artery occlusion with cerebral infarction ([address] Diabetes, High blood pressure, Hyperlipidemia, Seizure ([address] SVT (supraventricular tachycardia) ([facility] and Type II or unspecified type diabetes mellitus without mention of complication, not stated as uncontrolled. who presents to the emergency department    82-year-old female presents with altered mental status specifically aphasia which occurred briefly around 2:30 PM and lasted a few minutes and then completely resolved. Patient has a history of 2 prior strokes last of which was last year which she received tPA. States this is similar to her last stroke. She was initially being seen in the clinic because she had a mosquito bite on her left arm and it has been itchy she was supposed to receive some steroids. She has no other new recent symptoms. She has risk factors history of prior strokes, diabetes, high blood pressure, hyperlipidemia. No other recent injury or illness. At this time patient has no symptoms. When she was having symptoms she was having word finding difficulty felt like she could not actually get the words out of her mouth \"could not think\". Now completely resolved. Symptoms were sudden in onset.   She review PHACOEMULSIFICATION WITH INTRAOCULAR LENS IMPLANT  performed by MD Krystin at [address] [facility] EXTRACTION Left 3/25/2019    PHACOEMULSIFICATION WITH INTRAOCULAR LENS IMPLANT  performed by MD Krystin at [facility] Left     TONSILLECTOMY           CURRENT MEDICATIONS       Previous Medications    ALPRAZOLAM (XANAX) 0.5 MG TABLET    Take 0.5 mg by mouth 2 times daily. AMLODIPINE (NORVASC) 10 MG TABLET    Take 5 mg by mouth daily    APIXABAN (ELIQUIS) 5 MG TABS TABLET    Take 5 mg by mouth 2 times daily    ATENOLOL (TENORMIN) 50 MG TABLET    Take 50 mg by mouth daily    ATORVASTATIN (LIPITOR) 10 MG TABLET    Take 10 mg by mouth nightly    DEXLANSOPRAZOLE (DEXILANT) 60 MG CPDR    Take 60 mg by mouth daily. DULOXETINE (CYMBALTA) 60 MG EXTENDED RELEASE CAPSULE    Take 60 mg by mouth 2 times daily     LISINOPRIL (PRINIVIL;ZESTRIL) 20 MG TABLET    Take 20 mg by mouth daily    METFORMIN (GLUCOPHAGE) 1000 MG TABLET    Take 1 tablet by mouth 2 times daily (with meals)    PANTOPRAZOLE (PROTONIX) 40 MG TABLET    TAKE 1 TABLET EVERY DAY    QUETIAPINE (SEROQUEL) 100 MG TABLET    Take 100 mg by mouth nightly as needed (insomnia)             Lexapro [escitalopram oxalate] and Wellbutrin [bupropion]    FAMILY HISTORY       Family History   Problem Relation Age of Onset    Heart Disease Mother     Cancer Father           SOCIAL HISTORY       Social History     Socioeconomic History    Marital status:       Spouse name: Not on file    Number of children: Not on file    Years of education: Not on file    Highest education level: Not on file   Occupational History    Not on file   Tobacco Use    Smoking status: Former Smoker    Smokeless tobacco: Never Used    Tobacco comment: quit 30 yrs ago   Vaping Use    Vaping Use: Never used   Substance and Sexual Activity    Alcohol use: No    Drug use: No    Sexual activity: Not on file Other Topics Concern    Not on file   Social History Narrative    Not on file     Social Determinants of Health     Financial Resource Strain:     Difficulty of Paying Living Expenses: Not on file   Food Insecurity:     Worried About Running Out of Food in the Last Year: Not on file    Gloria of Food in the Last Year: Not on file   Transportation Needs:     Lack of Transportation (Medical): Not on file    Lack of Transportation (Non-Medical): Not on file   Physical Activity:     Days of Exercise per Week: Not on file    Minutes of Exercise per Session: Not on file   Stress:     Feeling of Stress : Not on file   Social Connections:     Frequency of Communication with Friends and Family: Not on file    Frequency of Social Gatherings with Friends and Family: Not on file    Attends Mu-ism Services: Not on file    Active Member of [address] [place] [address] [employer] or Organizations: Not on file    Attends Club or Organization Meetings: Not on file    Marital Status: Not on file   Intimate Partner Violence:     Fear of Current or Ex-Partner: Not on file    Emotionally Abused: Not on file    Physically Abused: Not on file    Sexually Abused: Not on file   Housing Stability:     Unable to Pay for Housing in the Last Year: Not on file    Number of [place] in the Last Year: Not on file    Unstable Housing in the Last Year: Not on file       SCREENINGS   NIH Stroke Scale  Interval: Baseline  Level of Consciousness (1a): Alert  LOC Questions (1b): Answers both correctly  LOC Commands (1c): Performs both tasks correctly  Best Gaze (2): Normal  Visual (3): No visual loss  Facial Palsy (4): Normal symmetrical movement  Motor Arm, Left (5a): No drift  Motor Arm, Right (5b): No drift  Motor Leg, Left (6a): No drift  Motor Leg, Right (6b):  No drift  Limb Ataxia (7): Absent  Sensory (8): Normal  Best Language (9): No aphasia  Dysarthria (10): Normal  Extinction and Inattention (11): No abnormality  Total: 0Glasgow Coma Scale  Eye Opening: Spontaneous  Best Verbal Response: Oriented  Best Motor Response: Obeys commands  Pascale Coma Scale Score: 15        PHYSICAL EXAM    (up to 7 for level 4, 8 or more for level 5)     ED Triage Vitals [07/06/22 1622]   BP Temp Temp Source Heart Rate Resp SpO2 Height Weight   (!) 182/85 98 °F (36.7 °C) Oral 83 17 96 % 5' 5\" (1.651 m) 184 lb 1.4 oz (83.5 kg)       Physical Exam  Vitals and nursing note reviewed. Constitutional:       General: She is not in acute distress. Appearance: Normal appearance. She is well-developed. She is not ill-appearing, toxic-appearing or diaphoretic. HENT:      Head: Normocephalic and atraumatic. Right Ear: Tympanic membrane and external ear normal.      Left Ear: Tympanic membrane and external ear normal.      Nose: Nose normal.      Mouth/Throat:      Mouth: Mucous membranes are moist.   Eyes:      General: No visual field deficit. Extraocular Movements: Extraocular movements intact. Right eye: Normal extraocular motion and no nystagmus. Left eye: Normal extraocular motion and no nystagmus. Conjunctiva/sclera: Conjunctivae normal.      Pupils: Pupils are equal, round, and reactive to light. Right eye: Pupil is round and reactive. Left eye: Pupil is round and reactive. Cardiovascular:      Rate and Rhythm: Normal rate and regular rhythm. Heart sounds: Normal heart sounds. Pulmonary:      Effort: Pulmonary effort is normal.      Breath sounds: Normal breath sounds. Abdominal:      General: Abdomen is flat. Bowel sounds are normal. There is no distension. Palpations: Abdomen is soft. Tenderness: There is no abdominal tenderness. Musculoskeletal:      Cervical back: Normal range of motion and neck supple. Skin:     General: Skin is warm and dry. Capillary Refill: Capillary refill takes less than 2 seconds. Neurological:      General: No focal deficit present.       Mental Status: She is alert and oriented to person, place, and time. GCS: GCS eye subscore is 4. GCS verbal subscore is 5. GCS motor subscore is 6. Cranial Nerves: Cranial nerves are intact. No cranial nerve deficit, dysarthria or facial asymmetry. Sensory: Sensation is intact. No sensory deficit. Motor: Motor function is intact. No weakness, tremor, atrophy, abnormal muscle tone or pronator drift. Coordination: Coordination is intact. Coordination normal. Finger-Nose-Finger Test and Heel to [facility] Test normal.   Psychiatric:         Mood and Affect: Mood normal.         Speech: Speech normal.         Behavior: Behavior normal.         RESULTS     EKG: All EKG's are interpreted by the Emergency Department Physician who either signs or Co-signsthis chart in the absence of a cardiologist.    EKG Interpretation    Interpreted by emergency department physician    Rhythm: normal sinus   Rate: normal  Axis: left  Ectopy: premature atrial contraction  Conduction: normal  ST Segments: nonspecific changes  T Waves: non specific changes  Q Waves: none    Clinical Impression: non-specific EKG      RADIOLOGY:   Non-plain filmimages such as CT, Ultrasound and MRI are read by the radiologist.   Interpretation per the Radiologist below, if available at the time ofthis note:    CT Head WO Contrast   Final Result   1. No acute intracranial abnormality. 2. Mild narrowing in the V4 segments of the vertebral arteries and bilateral   supraclinoid ICAs with less than 50% stenosis. 3. Otherwise unremarkable CTA of the head and neck. CTA HEAD NECK W CONTRAST   Final Result   1. No acute intracranial abnormality. 2. Mild narrowing in the V4 segments of the vertebral arteries and bilateral   supraclinoid ICAs with less than 50% stenosis. 3. Otherwise unremarkable CTA of the head and neck.                ED BEDSIDE ULTRASOUND:   Performed by ED Physician - none    LABS:  Labs Reviewed   COMPREHENSIVE METABOLIC PANEL W/ REFLEX TO MG FOR LOW K - Abnormal; Notable for the following components:       Result Value    Sodium 135 (*)     Chloride 97 (*)     Glucose 149 (*)     All other components within normal limits   URINALYSIS WITH REFLEX TO CULTURE - Abnormal; Notable for the following components:    Leukocyte Esterase, Urine LARGE (*)     All other components within normal limits   MICROSCOPIC URINALYSIS - Abnormal; Notable for the following components:    WBC, UA 38 (*)     All other components within normal limits   CULTURE, URINE   CBC WITH AUTO DIFFERENTIAL   TROPONIN       All other labs were within normal range or not returned as of this dictation. EMERGENCY DEPARTMENT COURSE and DIFFERENTIAL DIAGNOSIS/MDM:   Vitals:    Vitals:    07/06/22 1645 07/06/22 1700 07/06/22 1715 07/06/22 1850   BP: (!) 169/96 (!) 174/98 (!) 177/84 (!) 178/80   Pulse: 82 62 80 68   Resp:    24   Temp:       TempSrc:       SpO2: 95% 95% 93% 99%   Weight:       Height:           Patient was given thefollowing medications:  Medications   iopamidol (ISOVUE-370) 76 % injection 75 mL (75 mLs IntraVENous Given 7/6/22 1801)   aspirin tablet 325 mg (325 mg Oral Given 7/6/22 1907)   diphenhydrAMINE (BENADRYL) tablet 25 mg (25 mg Oral Given 7/6/22 1924)       ED COURSE & MEDICAL DECISION MAKING    Pertinent Labs & Imaging studies reviewed. (See chart for details)   -  Patient seen and evaluated in the emergency department. -  Triage and nursing notes reviewed and incorporated. -  Old chart records reviewed and incorporated.   -  Differential diagnosis includes: Differential Diagnosis:    Hypoxemia/ischemic encephalopathy, hepatic encephalopathy   Seizure or postictal state   Alterations of glucose such as hypoglycemia and hyperglycemia    Alterations in perfusions such as hypotension and hypoperfusion    Alterations in electrolytes such as disturbances in sodium or calcium   Infectious processes such as sepsis from a pneumonia or urinary tract infection    Substance use or withdrawal, especially alcohol and drugs    Medication adverse event or interaction    Vitamin deficiencies such as Wernicke's encephalopathy    CNS lesion, injury, infection (CVA, subdural hematoma, meningitis, encephalitis)    Alterations in hormones such as thyroid or adrenal abnormalities    Alterations in cardiac functioning such as arrhythmia, MI or CHF    Alteration in temperature such as hyperthermia or hypothermia    Dehydration, sleep deprivation   Change in medical regimen    Alteration in lifestyle, environment, or personal relationships    30-year-old female presents with temporary aphasia concerning for TIA. Patient has history of 2 prior strokes with tPA administration last year. Patient is afebrile nontachycardic saturating well on room air. Slightly hypertensive. Patient's neuro exam is completely unremarkable at this time. NIH is 0. Likely TIA. Will order labs CT imaging and admit for further work-up. Will admit for MRI. We will also give patient aspirin.      -  Work-up included:  See above  -  ED treatment included: See above  -  Results discussed with patient. The patient is agreeable with plan of care and disposition. Is this patient to be included in the SEP-1 Core Measure due to severe sepsis or septic shock? No   Exclusion criteria - the patient is NOT to be included for SEP-1 Core Measure due to: Infection is not suspected     REASSESSMENT     ED Course as of 07/06/22 1940 Wed Jul 06, 2022   1849 CT head with no acute intracranial abnormality and some mild stenosis of V4 segments of the vertebral arteries otherwise unremarkable CTA [SC]   1849 Urine pending at this time rest of labs otherwise unremarkable. Vitals remained stable. We will plan on admission [SC]      ED Course User Index  [SC] Nathan Aguiar MD         CRITICAL CARE TIME   Total Critical Care time was 24 minutes, excluding separately reportable procedures.   There was a high probability of clinically significant/life threatening deterioration in the patient's condition which required my urgent intervention. This includes multiple reevaluations, vital sign monitoring, pulse oximetry monitoring, telemetry monitoring, clinical response to the IV medications, reviewing the nursing notes, consultation time, dictation/documentation time, and interpretation of the labwork. (This time excludes time spent performing procedures). CONSULTS:  IP CONSULT TO PHARMACY  PHARMACY TO CHANGE BASE FLUIDS  IP CONSULT TO HOSPITALIST    PROCEDURES:  Unless otherwise noted below, none     Procedures    FINAL IMPRESSION      1. TIA (transient ischemic attack)    2. Aphasia    3. Transient alteration of awareness          DISPOSITION/PLAN   DISPOSITION        PATIENT REFERREDTO:  No follow-up provider specified.     DISCHARGEMEDICATIONS:  New Prescriptions    No medications on file          (Please note that portions of this note were completed with a voice recognition program.  Efforts were made to edit the dictations but occasionally words are mis-transcribed.)    Marlise Simmonds, MD (electronically signed)  Attending Emergency Physician          Marlise Simmonds, MD  07/06/22 7192

## 2022-07-07 ENCOUNTER — APPOINTMENT (OUTPATIENT)
Dept: MRI IMAGING | Age: 77
End: 2022-07-07
Payer: MEDICARE

## 2022-07-07 VITALS
SYSTOLIC BLOOD PRESSURE: 106 MMHG | HEART RATE: 81 BPM | TEMPERATURE: 98 F | HEIGHT: 65 IN | RESPIRATION RATE: 17 BRPM | BODY MASS INDEX: 29.31 KG/M2 | WEIGHT: 175.93 LBS | DIASTOLIC BLOOD PRESSURE: 78 MMHG | OXYGEN SATURATION: 98 %

## 2022-07-07 LAB
CHOLESTEROL, TOTAL: 162 MG/DL (ref 0–199)
EKG ATRIAL RATE: 63 BPM
EKG DIAGNOSIS: NORMAL
EKG P AXIS: 17 DEGREES
EKG P-R INTERVAL: 148 MS
EKG Q-T INTERVAL: 422 MS
EKG QRS DURATION: 86 MS
EKG QTC CALCULATION (BAZETT): 431 MS
EKG R AXIS: -3 DEGREES
EKG T AXIS: -17 DEGREES
EKG VENTRICULAR RATE: 63 BPM
ESTIMATED AVERAGE GLUCOSE: 157.1 MG/DL
HBA1C MFR BLD: 7.1 %
HDLC SERPL-MCNC: 52 MG/DL (ref 40–60)
LDL CHOLESTEROL CALCULATED: 70 MG/DL
LV EF: 63 %
LVEF MODALITY: NORMAL
TRIGL SERPL-MCNC: 200 MG/DL (ref 0–150)
VLDLC SERPL CALC-MCNC: 40 MG/DL

## 2022-07-07 PROCEDURE — 2580000003 HC RX 258: Performed by: INTERNAL MEDICINE

## 2022-07-07 PROCEDURE — 97530 THERAPEUTIC ACTIVITIES: CPT

## 2022-07-07 PROCEDURE — 70551 MRI BRAIN STEM W/O DYE: CPT

## 2022-07-07 PROCEDURE — 6370000000 HC RX 637 (ALT 250 FOR IP): Performed by: INTERNAL MEDICINE

## 2022-07-07 PROCEDURE — 6360000002 HC RX W HCPCS: Performed by: INTERNAL MEDICINE

## 2022-07-07 PROCEDURE — G0378 HOSPITAL OBSERVATION PER HR: HCPCS

## 2022-07-07 PROCEDURE — 97161 PT EVAL LOW COMPLEX 20 MIN: CPT

## 2022-07-07 PROCEDURE — 93010 ELECTROCARDIOGRAM REPORT: CPT | Performed by: INTERNAL MEDICINE

## 2022-07-07 PROCEDURE — 96365 THER/PROPH/DIAG IV INF INIT: CPT

## 2022-07-07 RX ORDER — PIOGLITAZONEHYDROCHLORIDE 45 MG/1
45 TABLET ORAL DAILY
COMMUNITY
Start: 2022-06-27

## 2022-07-07 RX ORDER — POTASSIUM CHLORIDE 750 MG/1
10 TABLET, EXTENDED RELEASE ORAL EVERY OTHER DAY
COMMUNITY
Start: 2021-11-19

## 2022-07-07 RX ORDER — PHENOL 1.4 %
1 AEROSOL, SPRAY (ML) MUCOUS MEMBRANE DAILY
COMMUNITY

## 2022-07-07 RX ORDER — FUROSEMIDE 40 MG/1
40 TABLET ORAL EVERY OTHER DAY
COMMUNITY
Start: 2022-05-11

## 2022-07-07 RX ORDER — CIPROFLOXACIN 500 MG/1
500 TABLET, FILM COATED ORAL 2 TIMES DAILY
Qty: 6 TABLET | Refills: 0 | Status: SHIPPED | OUTPATIENT
Start: 2022-07-07 | End: 2022-07-10

## 2022-07-07 RX ORDER — ALENDRONATE SODIUM 70 MG/1
70 TABLET ORAL
COMMUNITY
Start: 2022-07-01

## 2022-07-07 RX ADMIN — ATENOLOL 50 MG: 50 TABLET ORAL at 08:31

## 2022-07-07 RX ADMIN — PANTOPRAZOLE SODIUM 40 MG: 40 TABLET, DELAYED RELEASE ORAL at 07:06

## 2022-07-07 RX ADMIN — LISINOPRIL 20 MG: 20 TABLET ORAL at 08:31

## 2022-07-07 RX ADMIN — APIXABAN 5 MG: 5 TABLET, FILM COATED ORAL at 08:31

## 2022-07-07 RX ADMIN — AMLODIPINE BESYLATE 5 MG: 5 TABLET ORAL at 08:31

## 2022-07-07 RX ADMIN — DULOXETINE HYDROCHLORIDE 60 MG: 60 CAPSULE, DELAYED RELEASE ORAL at 08:31

## 2022-07-07 RX ADMIN — CEFTRIAXONE 1000 MG: 1 INJECTION, POWDER, FOR SOLUTION INTRAMUSCULAR; INTRAVENOUS at 07:12

## 2022-07-07 NOTE — PROGRESS NOTES
Pt arrived from ED to room 5263. Pt ambulated well to bed from wheelchair. Pt A&O x4. NIHSS 0. Connected to bedside monitor and confirmed with [facility]. Pt still complaining of itching on left arm. Pt denies any further needs at this time.      Electronically signed by Naty Fernandes RN on 7/6/2022 at 11:42 PM

## 2022-07-07 NOTE — PROGRESS NOTES
Pharmacy Medication Reconciliation Note     List of medications patient is currently taking is complete. Source of information:   1. Conversation with patient   2. EMR    Notes regarding home medications:   1.  The following changes were made to the patient's home medication list:   Added furosemide, potassium chloride, pioglitazone, calcium, and alendronate   Made changes to Xanax (dose) and pantoprazole (frequency)   Removed dexlansoprazole      Mat Gonzalez PharmD  7/7/2022 10:45 AM

## 2022-07-07 NOTE — PROGRESS NOTES
Occupational Therapy    OT referral received and appreciated. Per PT, pt is independent in room. No concerns for ADLs or mobility. MRI negative for an acute infarct. Will sign off at this time. Please re-consult if new needs arise.      Electronically signed by KENDRA Almonte on 7/7/2022 at 11:01 AM

## 2022-07-07 NOTE — PLAN OF CARE
Problem: Discharge Planning  Goal: Discharge to home or other facility with appropriate resources  Outcome: Progressing     Problem: Safety - Adult  Goal: Free from fall injury  Outcome: Progressing   Patient assessed for fall risk; fall precautions initiated. Patient and family instructed about safety devices. Environment kept free of clutter and adequate lighting provided. Bed locked and in lowest position. Call light within reach. Will continue to monitor. Problem: ABCDS Injury Assessment  Goal: Absence of physical injury  Outcome: Progressing   Skin assessment completed every shift. Pt assessed for incontinence, appropriate barrier cream applied prn. Pt encouraged to turn/rotate every 2 hours. Assistance provided if pt unable to do so themselves.

## 2022-07-07 NOTE — ACP (ADVANCE CARE PLANNING)
Advance Care Planning     Advance Care Planning Activator (Inpatient)  Conversation Note      Date of ACP Conversation: 7/7/2022     Conversation Conducted with: Patient with Decision Making Capacity    ACP Activator: Pilo Painter RN    Health Care Decision Maker:     Current Designated Health Care Decision Maker:     Primary Decision Maker: Jil Jorgensen - [phone]    Secondary Decision Maker: Davion Valdez - [place] - [phone]    Today we documented Decision Maker(s) consistent with Legal Next of Kin hierarchy. Care Preferences    Ventilation: \"If you were in your present state of health and suddenly became very ill and were unable to breathe on your own, what would your preference be about the use of a ventilator (breathing machine) if it were available to you? \"      Would the patient desire the use of ventilator (breathing machine)?: yes    \"If your health worsens and it becomes clear that your chance of recovery is unlikely, what would your preference be about the use of a ventilator (breathing machine) if it were available to you? \"     Would the patient desire the use of ventilator (breathing machine)?: No      Resuscitation  \"CPR works best to restart the heart when there is a sudden event, like a heart attack, in someone who is otherwise healthy. Unfortunately, CPR does not typically restart the heart for people who have serious health conditions or who are very sick. \"    \"In the event your heart stopped as a result of an underlying serious health condition, would you want attempts to be made to restart your heart (answer \"yes\" for attempt to resuscitate) or would you prefer a natural death (answer \"no\" for do not attempt to resuscitate)? \" yes       [] Yes   [x] No   Educated Patient / Tess Villatoro regarding differences between Advance Directives and portable DNR orders.     Length of ACP Conversation in minutes:  5 minutes    Conversation Outcomes:  [x] ACP discussion completed  [] Existing advance directive reviewed with patient; no changes to patient's previously recorded wishes  [] New Advance Directive completed  [] Portable Do Not Rescitate prepared for Provider review and signature  [] POLST/POST/MOLST/MOST prepared for Provider review and signature      Follow-up plan:    [] Schedule follow-up conversation to continue planning  [] Referred individual to Provider for additional questions/concerns   [] Advised patient/agent/surrogate to review completed ACP document and update if needed with changes in condition, patient preferences or care setting    [x] This note routed to one or more involved healthcare providers    Electronically signed by Jg Gregg RN Case Management [phone] on 7/7/2022 at 3:36 PM

## 2022-07-07 NOTE — ED NOTES
Report called to Thierry Booker 90 on 5N. No further questions at this time.  Waiting for room to be cleaned to transport     Brittney Hill RN  07/06/22 8466

## 2022-07-07 NOTE — H&P
Temperature is 98, respiratory rate is 17, pulse 83, blood  pressure 182/85, saturating 96%. CNS:  The patient is currently alert, awake and oriented. PSYCH:  The patient is cooperative, answering questions appropriately. HEENT:  Eyes:  Pupils are reactive to light. ENT:  Extraocular muscle  movements are intact. RESPIRATORY:  Clear to auscultation. CARDIOVASCULAR:  S1, S2 are heard. No murmurs or rubs. ABDOMEN:  Nondistended. MUSCULOSKELETAL:  No acute deformities. SKIN:  Appears dry but without rashes or lesions. DIAGNOSTIC DATA:  CBC showed white count of 7.7 with a hemoglobin of  13.1. BUN 20, creatinine 0.7. Sodium 135, potassium 4.1. CT head without contrast shows no acute abnormalities. UA shows the  patient has got large amount of leukocyte esterase with 38 white cells. CONSULTATIONS:  I am requesting a consultation to Neurology. REVIEW OF PREVIOUS MEDICAL RECORDS:  Shows a 2D echo from 2019 that  showed an LV ejection fraction of 60%. ASSESSMENT:  1. Transient ischemic attack with transient aphasia/dysarthria. 2.  Type 2 diabetes. 3.  Hypertension. 4.  Paroxysmal atrial fibrillation. PLAN OF CARE:  The patient is admitted to the Internal Medicine service  to observation. Telemetry monitoring will be continued. Neurology  consult requested. Antiplatelet therapy will be continued. The patient  also has concomitant UTI for which she has been initiated on once-a-day  intravenous ceftriaxone. MRI of the brain requested. CODE STATUS:  Full. EXPECTED LENGTH OF STAY:  Less than two midnights based on the plan of  care above. DISPOSITION:  Observation telemetry. RISK:  High due to the patient's presentation with the symptoms  suggestive of a TIA.         Brianna Tian MD    D: 07/07/2022 6:08:56       T: 07/07/2022 7:56:03     [email or web address]  Job#: [id number]     Doc#: [id number]    CC:

## 2022-07-07 NOTE — CONSULTS
Neurology Consult Note  Reason for Consult: TIA    Chief complaint: trouble talking    Dr Herminio Carmona MD asked me to see Azeem Gutierrez in consultation for evaluation of TIA    History of Present Illness:  Azeem Gutierrez is a 78 y.o. female who presents with trouble talking. I obtained my information via interview w/ the patient, supplemented by chart review. The patient says that yesterday she went to the clinic at [facility] as she had some sort of insect bite on her L arm and thought she might have cellulitis. This was around [place]. Apparently she was told to come back at 1500 in order for someone to given her a steroid injection. She said she had a headache. It was not debilitating though still bothered her enough. She says she does have a hx of migraines (this headache was unlike her migraines) though has not had any issues in 2.5 years. While there she said that she was having trouble thinking of what to say, getting words out, etc.  She said at one point she did get the words \"mosquito\" out though it really didn't make much sense. She says this went on for an hour or so then resolved. BP here was 182/85. No fever. CT head was w/out any acute findings. CTA head/neck negative. NIH 0.  UA abnormal.  She feels fine today, no complaints. She denies ever experiencing anything like this in the past.  She has had 2 strokes before, at one point receiving TPA. She has been maintained on Eliquis BID which she takes daily w/out missing any doses. She does say 12 years ago she had a 5 minute episode like the one she had yesterday though was never evaluated for it. She follows w/ Cardiology at [facility]. She did just have an ablation in March. It appears a watchman was attempted at one point though unsuccessful.       Medical History:  Past Medical History:   Diagnosis Date    Arthritis     Back pain     Cancer (HCC)     breast, Left Lumpectomy and radiation      (insomnia)   Dexlansoprazole (DEXILANT) 60 MG CPDR, Take 60 mg by mouth daily. (Patient taking differently: Take 60 mg by mouth daily as needed )  DULoxetine (CYMBALTA) 60 MG extended release capsule, Take 60 mg by mouth 2 times daily     Allergies   Allergen Reactions    Lexapro [Escitalopram Oxalate] Other (See Comments)     Makes her suicidal    Wellbutrin [Bupropion] Other (See Comments)     Makes her suicidal     Family History   Problem Relation Age of Onset    Heart Disease Mother     Cancer Father      Social History     Tobacco Use   Smoking Status Former Smoker   Smokeless Tobacco Never Used   Tobacco Comment    quit 30 yrs ago     Social History     Substance and Sexual Activity   Drug Use No     Social History     Substance and Sexual Activity   Alcohol Use No     ROS  Constitutional- No weight loss or fevers  Eyes- No diplopia. No photophobia. Ears/nose/throat- No dysphagia. No Dysarthria  Cardiovascular- No palpitations. No chest pain  Respiratory- No dyspnea. No Cough  Gastrointestinal- No Abdominal pain. No Vomiting. Genitourinary- No incontinence. No urinary retention  Musculoskeletal- No myalgia. No arthralgia  Skin- No rash. No easy bruising. Psychiatric- No depression. No anxiety  Endocrine- No diabetes. No thyroid issues. Hematologic- No bleeding difficulty. No fatigue  Neurologic- No weakness. No Headache. Exam:  Blood pressure (!) 141/78, pulse 84, temperature 97.9 °F (36.6 °C), temperature source Oral, resp. rate 17, height 5' 5\" (1.651 m), weight 175 lb 14.8 oz (79.8 kg), SpO2 93 %. Constitutional    Vital signs: BP, HR, and RR reviewed   General alert, no distress. Eyes: unable to visualize the fundi  Cardiovascular: no peripheral edema. Psychiatric: cooperative with examination, no psychotic behavior noted. Neurologic  Mental status:   orientation to person, place, time.      General fund of knowledge grossly intact   Memory grossly intact   Attention intact as able to ? UTI  Atrial fibrillation. On Eliqius. HTN. DM. Hx migraines. Hx stroke. Recommendations  Continue anticoagulation/statin for stroke prophylaxis. Check HgA1c, lipid panel. BP control. She has plans to see her Cardiologist next week. Advised to discuss if an alternative to Eliquis needs to be considered.       Farrah Sevilla NP  [address] [place] [address] [address] Neurology    A copy of this note was provided for Dr Terry Peres MD

## 2022-07-07 NOTE — PROGRESS NOTES
Pt discharged, son present and transporting pt, reviewed all meds with pt and son, pt is going to be taking cipro upon discharge, educated not to take seroquel or xanax while on this med per Roldan Garcia and Dr Jocy Moore, reviewed all discharge instructions and follow up appts, both pt and son state that they understand, pt ambulated without difficulty to the car per son

## 2022-07-07 NOTE — ED NOTES
Pt cleaned of stool, placed into new brief and repositioned in bed.       Megan Hdz RN  07/06/22 4805

## 2022-07-07 NOTE — PLAN OF CARE
Problem: Discharge Planning  Goal: Discharge to home or other facility with appropriate resources  7/7/2022 1725 by Jay Qureshi RN  Outcome: Completed  7/7/2022 1451 by Jay Qureshi RN  Outcome: Progressing     Problem: Safety - Adult  Goal: Free from fall injury  7/7/2022 1725 by Jay Qureshi RN  Outcome: Completed  7/7/2022 1451 by Jay Qureshi RN  Outcome: Progressing     Problem: ABCDS Injury Assessment  Goal: Absence of physical injury  7/7/2022 1725 by Jay Qureshi RN  Outcome: Completed  7/7/2022 1451 by Jay Qureshi RN  Outcome: Progressing     Problem: Neurosensory - Adult  Goal: Achieves stable or improved neurological status  Outcome: Completed     Problem: Respiratory - Adult  Goal: Achieves optimal ventilation and oxygenation  Outcome: Completed     Problem: Cardiovascular - Adult  Goal: Maintains optimal cardiac output and hemodynamic stability  Outcome: Completed     Problem: Skin/Tissue Integrity - Adult  Goal: Skin integrity remains intact  Outcome: Completed     Problem: Musculoskeletal - Adult  Goal: Return mobility to safest level of function  Outcome: Completed     Problem: Metabolic/Fluid and Electrolytes - Adult  Goal: Electrolytes maintained within normal limits  Outcome: Completed

## 2022-07-07 NOTE — PROGRESS NOTES
Physical Therapy  Facility/Department: [facility] PROGRESSIVE CARE  Physical Therapy Initial Assessment/Discharge Summary    Name: Ming Thompson  : [id number]  MRN: [id number]  Date of Service: 2022    Discharge Recommendations:  Home independently   PT Equipment Recommendations  Equipment Needed: No   Ming Thompson scored a 24/24 on the AM-PAC short mobility form. At this time, no further PT is recommended upon discharge. Assessment   Assessment: 77 y/o female admit 2022 with TIA, Slurred Speech. CT Head negative. MRI pending. PMH as noted including CVA (2019, tPA), Breast Ca/Lumpectomy, SVT, Back Surg, Hip Fx/Surg. PTA pt living in [facility] with dtr with level entry/access; independent daily care and functional mobility. Currently, Pt sharyn oob, amb functional distances without deviations or LOB. Pt reports adequate assist/support upon return home. No further PT indicated at this time. Therapy Prognosis: Good  Decision Making: Low Complexity  History: 77 y/o female admit 2022 with TIA, Slurred Speech. CT Head negative. MRI pending. PMH as noted including CVA (2019, tPA), Breast Ca/Lumpectomy, SVT, Back Surg, Hip Fx/Surg. Exam: See above. Clinical Presentation: See above. Barriers to Learning: None. Requires PT Follow-Up: No  Activity Tolerance  Activity Tolerance: Patient tolerated treatment well  Activity Tolerance Comments: Pt sharyn oob, amb functional distances without deviations or LOB. Plan   Plan  Plan: Discharge with evaluation only  Safety Devices  Type of Devices: Call light within reach,Left in bed,Nurse notified     Restrictions  Restrictions/Precautions  Restrictions/Precautions: Up as Tolerated     Subjective   General  Chart Reviewed: Yes  Patient assessed for rehabilitation services?: Yes  Additional Pertinent Hx: 77 y/o female admit 2022 with TIA, Slurred Speech. CT Head negative. MRI pending.   PMH as noted including CVA (2019, tPA), Breast Ca/Lumpectomy, SVT, Back Surg, Hip Fx/Surg. Family / Caregiver Present: No  Referring Practitioner: Dr. Lior Sheppard: Within Functional Limits  Subjective  Subjective: Pt pleasant, agreeable to PT Eval/Rx. Social/Functional History  Social/Functional History  Lives With: Daughter (Dtr (works, teacher). )  Type of Home: Condo  Home Layout: One level  Home Access: Level entry  Bathroom Shower/Tub: Walk-in shower  Bathroom Toilet: Standard  Bathroom Accessibility: Accessible  Home Equipment: Cane,Walker, rolling  Has the patient had two or more falls in the past year or any fall with injury in the past year?: No  ADL Assistance: [facility] [facility] [address]:  (Shared with dtr.)  Ambulation Assistance: Independent (Without assist device pta.)  Transfer Assistance: Independent  Active : Yes  Occupation: Retired  Type of Occupation: Retired : worked [employer] ([address]). Vision/Hearing  Vision  Vision: Within Functional Limits  Hearing  Hearing: Within functional limits    Cognition   Orientation  Overall Orientation Status: Within Functional Limits  Cognition  Overall Cognitive Status: WFL     Objective           Gross Assessment  AROM: Within functional limits  Strength: Within functional limits                    Bed mobility  Supine to Sit: Independent  Sit to Supine: Independent  Transfers  Sit to Stand: Independent  Stand to sit: Independent  Ambulation  Surface: level tile  Device: No Device  Distance: Pt amb ~ 100' without assist device Independently. No LE buckling/giving way. No LOB noted. Gait steady, good solomon.           AM-PAC Score  AM-PAC Inpatient Mobility Raw Score : 24 (07/07/22 0844)  AM-PAC Inpatient T-Scale Score : 61.14 (07/07/22 0844)  Mobility Inpatient CMS 0-100% Score: 0 (07/07/22 0844)  Mobility Inpatient CMS G-Code Modifier : [facility] (07/07/22 4862)          Goals  Short Term Goals  Time Frame for Short term goals: No Acute Care PT Goals Identified. Patient Goals   Patient goals : Return home.        Education  Patient Education  Education Given To: Patient  Education Provided Comments: Role of PT, POC, Need to call for assist.  Education Method: Verbal  Barriers to Learning: None  Education Outcome: Verbalized understanding      Therapy Time   Individual Concurrent Group Co-treatment   Time In 0710         Time Out 0740         Minutes [address], PT

## 2022-07-07 NOTE — CARE COORDINATION
INITIAL CASE MANAGEMENT ASSESSMENT    Reviewed chart, met with patient to assess possible discharge needs. Explained Case Management role/services. Living Situation: Confirmed address, lives with daughter in a 1 level condo, 0 steps    ADLs: Independent, daughter assists with homemaking duties     DME: Wheeled walker, cane, shower chair    PT/OT Recs: Home independently     Active Services: None     Transportation: Active      Medications: Uses [facility] or [facility] in [place]. No issues obtaining/affording medications. PCP: Suzie Jackson      HD/PD: n/a    PLAN/COMMENTS: Patient will return home at discharge. Denies home needs. Son to transport home. SW/CM provided contact information for patient or family to call with any questions. SW/CM will follow and assist as needed.   Electronically signed by Carmen Hamilton RN Case Management [phone] on 7/7/2022 at 3:34 PM

## 2022-07-08 NOTE — DISCHARGE SUMMARY
HOSPITALIST  IP CONSULT TO NEUROLOGY        Patient was diagnosed with:   Transient ischemic attack    Type 2 diabetes.    Hypertension.    Paroxysmal atrial fibrillation.        Treatment while inpatient:  Pt presented to the hospital with chief complaints of one-day  history of what she described as sudden onset of inability to say words  properly and feeling of numbness over the left upper extremity. Patient was ruled out for stroke. Patient's MRI of the brain was unremarkable. patient does have a history of migraine headaches. Patient said incident could be related to TIA or migraine headaches. Patient does have a history of atrial fibrillation for which patient is on Eliquis. Patient was evaluated by neurology. Patient will be continued on Eliquis and will be discharged on statin  Patient will be treated with ciprofloxacin for urinary tract infection.     Discharge Condition:  stable      Discharged to:  Home      Activity:   as tolerated:     Follow Up: Follow-up with PCP in 1-2 weeks                     Labs:  For convenience and continuity at follow-up the following most recent labs are provided:      CBC:   Lab Results   Component Value Date/Time    WBC 7.7 07/06/2022 04:39 PM    HGB 13.1 07/06/2022 04:39 PM    HCT 38.5 07/06/2022 04:39 PM     07/06/2022 04:39 PM       RENAL:   Lab Results   Component Value Date/Time     07/06/2022 04:39 PM    K 4.1 07/06/2022 04:39 PM    CL 97 07/06/2022 04:39 PM    CO2 23 07/06/2022 04:39 PM    BUN 20 07/06/2022 04:39 PM    CREATININE 0.7 07/06/2022 04:39 PM           Discharge Medications:      Medication List      START taking these medications    ciprofloxacin 500 MG tablet  Commonly known as: Cipro  Take 1 tablet by mouth 2 times daily for 3 days        CONTINUE taking these medications    alendronate 70 MG tablet  Commonly known as: FOSAMAX     ALPRAZolam 1 MG tablet  Commonly known as: XANAX     amLODIPine 5 MG tablet  Commonly

## 2022-07-10 LAB — URINE CULTURE, ROUTINE: NORMAL

## 2023-04-12 ENCOUNTER — APPOINTMENT (OUTPATIENT)
Dept: GENERAL RADIOLOGY | Age: 78
End: 2023-04-12
Payer: MEDICARE

## 2023-04-12 ENCOUNTER — APPOINTMENT (OUTPATIENT)
Dept: CT IMAGING | Age: 78
End: 2023-04-12
Payer: MEDICARE

## 2023-04-12 ENCOUNTER — HOSPITAL ENCOUNTER (OUTPATIENT)
Age: 78
Setting detail: OBSERVATION
Discharge: HOME | End: 2023-04-13
Attending: EMERGENCY MEDICINE | Admitting: INTERNAL MEDICINE
Payer: MEDICARE

## 2023-04-12 DIAGNOSIS — R29.6 FREQUENT FALLS: ICD-10-CM

## 2023-04-12 DIAGNOSIS — R47.81 SLURRED SPEECH: Primary | ICD-10-CM

## 2023-04-12 DIAGNOSIS — R26.2 DIFFICULTY IN WALKING: ICD-10-CM

## 2023-04-12 PROBLEM — G43.809 HEADACHE, VARIANT MIGRAINE: Status: ACTIVE | Noted: 2023-04-12

## 2023-04-12 LAB
ANION GAP SERPL CALCULATED.3IONS-SCNC: 14 MMOL/L (ref 3–16)
BASOPHILS # BLD: 0 K/UL (ref 0–0.2)
BASOPHILS NFR BLD: 0.6 %
BUN SERPL-MCNC: 18 MG/DL (ref 7–20)
CALCIUM SERPL-MCNC: 9.7 MG/DL (ref 8.3–10.6)
CHLORIDE SERPL-SCNC: 101 MMOL/L (ref 99–110)
CO2 SERPL-SCNC: 25 MMOL/L (ref 21–32)
CREAT SERPL-MCNC: 0.7 MG/DL (ref 0.6–1.2)
DEPRECATED RDW RBC AUTO: 13.9 % (ref 12.4–15.4)
EOSINOPHIL # BLD: 0.3 K/UL (ref 0–0.6)
EOSINOPHIL NFR BLD: 3.8 %
GFR SERPLBLD CREATININE-BSD FMLA CKD-EPI: >60 ML/MIN/{1.73_M2}
GLUCOSE BLD-MCNC: 125 MG/DL (ref 70–99)
GLUCOSE BLD-MCNC: 129 MG/DL (ref 70–99)
GLUCOSE SERPL-MCNC: 132 MG/DL (ref 70–99)
HCT VFR BLD AUTO: 37.2 % (ref 36–48)
HGB BLD-MCNC: 12.4 G/DL (ref 12–16)
INR PPP: 0.96 (ref 0.84–1.16)
LYMPHOCYTES # BLD: 1.4 K/UL (ref 1–5.1)
LYMPHOCYTES NFR BLD: 20.6 %
MCH RBC QN AUTO: 30.9 PG (ref 26–34)
MCHC RBC AUTO-ENTMCNC: 33.4 G/DL (ref 31–36)
MCV RBC AUTO: 92.6 FL (ref 80–100)
MONOCYTES # BLD: 0.5 K/UL (ref 0–1.3)
MONOCYTES NFR BLD: 7.8 %
NEUTROPHILS # BLD: 4.5 K/UL (ref 1.7–7.7)
NEUTROPHILS NFR BLD: 67.2 %
NT-PROBNP SERPL-MCNC: 243 PG/ML (ref 0–449)
PERFORMED ON: ABNORMAL
PERFORMED ON: ABNORMAL
PLATELET # BLD AUTO: 275 K/UL (ref 135–450)
PMV BLD AUTO: 7.6 FL (ref 5–10.5)
POTASSIUM SERPL-SCNC: 4.4 MMOL/L (ref 3.5–5.1)
PROTHROMBIN TIME: 12.8 SEC (ref 11.5–14.8)
RBC # BLD AUTO: 4.02 M/UL (ref 4–5.2)
SODIUM SERPL-SCNC: 140 MMOL/L (ref 136–145)
TROPONIN T SERPL-MCNC: <0.01 NG/ML
WBC # BLD AUTO: 6.7 K/UL (ref 4–11)

## 2023-04-12 PROCEDURE — 6360000004 HC RX CONTRAST MEDICATION: Performed by: PHYSICIAN ASSISTANT

## 2023-04-12 PROCEDURE — 6370000000 HC RX 637 (ALT 250 FOR IP): Performed by: NURSE PRACTITIONER

## 2023-04-12 PROCEDURE — 80048 BASIC METABOLIC PNL TOTAL CA: CPT

## 2023-04-12 PROCEDURE — 70450 CT HEAD/BRAIN W/O DYE: CPT

## 2023-04-12 PROCEDURE — 85610 PROTHROMBIN TIME: CPT

## 2023-04-12 PROCEDURE — 99285 EMERGENCY DEPT VISIT HI MDM: CPT

## 2023-04-12 PROCEDURE — G0378 HOSPITAL OBSERVATION PER HR: HCPCS

## 2023-04-12 PROCEDURE — 6360000002 HC RX W HCPCS: Performed by: EMERGENCY MEDICINE

## 2023-04-12 PROCEDURE — 96375 TX/PRO/DX INJ NEW DRUG ADDON: CPT

## 2023-04-12 PROCEDURE — 36415 COLL VENOUS BLD VENIPUNCTURE: CPT

## 2023-04-12 PROCEDURE — 93005 ELECTROCARDIOGRAM TRACING: CPT | Performed by: EMERGENCY MEDICINE

## 2023-04-12 PROCEDURE — 96374 THER/PROPH/DIAG INJ IV PUSH: CPT

## 2023-04-12 PROCEDURE — 70498 CT ANGIOGRAPHY NECK: CPT

## 2023-04-12 PROCEDURE — 83880 ASSAY OF NATRIURETIC PEPTIDE: CPT

## 2023-04-12 PROCEDURE — 85025 COMPLETE CBC W/AUTO DIFF WBC: CPT

## 2023-04-12 PROCEDURE — 71046 X-RAY EXAM CHEST 2 VIEWS: CPT

## 2023-04-12 PROCEDURE — 84484 ASSAY OF TROPONIN QUANT: CPT

## 2023-04-12 RX ORDER — DIPHENHYDRAMINE HYDROCHLORIDE 50 MG/ML
12.5 INJECTION INTRAMUSCULAR; INTRAVENOUS EVERY 6 HOURS PRN
Status: DISCONTINUED | OUTPATIENT
Start: 2023-04-12 | End: 2023-04-13 | Stop reason: HOSPADM

## 2023-04-12 RX ORDER — DIPHENHYDRAMINE HYDROCHLORIDE 50 MG/ML
12.5 INJECTION INTRAMUSCULAR; INTRAVENOUS ONCE
Status: COMPLETED | OUTPATIENT
Start: 2023-04-12 | End: 2023-04-12

## 2023-04-12 RX ORDER — ASPIRIN 300 MG/1
300 SUPPOSITORY RECTAL DAILY
Status: DISCONTINUED | OUTPATIENT
Start: 2023-04-13 | End: 2023-04-13 | Stop reason: HOSPADM

## 2023-04-12 RX ORDER — LABETALOL HYDROCHLORIDE 5 MG/ML
10 INJECTION, SOLUTION INTRAVENOUS EVERY 10 MIN PRN
Status: DISCONTINUED | OUTPATIENT
Start: 2023-04-12 | End: 2023-04-13 | Stop reason: HOSPADM

## 2023-04-12 RX ORDER — QUETIAPINE FUMARATE 100 MG/1
100 TABLET, FILM COATED ORAL NIGHTLY PRN
Status: DISCONTINUED | OUTPATIENT
Start: 2023-04-12 | End: 2023-04-13 | Stop reason: HOSPADM

## 2023-04-12 RX ORDER — POLYETHYLENE GLYCOL 3350 17 G/17G
17 POWDER, FOR SOLUTION ORAL DAILY PRN
Status: DISCONTINUED | OUTPATIENT
Start: 2023-04-12 | End: 2023-04-13 | Stop reason: HOSPADM

## 2023-04-12 RX ORDER — ATENOLOL 50 MG/1
50 TABLET ORAL DAILY
Status: DISCONTINUED | OUTPATIENT
Start: 2023-04-13 | End: 2023-04-13 | Stop reason: HOSPADM

## 2023-04-12 RX ORDER — ASPIRIN 81 MG/1
81 TABLET ORAL DAILY
Status: DISCONTINUED | OUTPATIENT
Start: 2023-04-13 | End: 2023-04-13 | Stop reason: HOSPADM

## 2023-04-12 RX ORDER — ALPRAZOLAM 0.5 MG/1
1 TABLET ORAL 2 TIMES DAILY PRN
Status: DISCONTINUED | OUTPATIENT
Start: 2023-04-12 | End: 2023-04-13 | Stop reason: HOSPADM

## 2023-04-12 RX ORDER — AMLODIPINE BESYLATE 5 MG/1
5 TABLET ORAL DAILY
Status: DISCONTINUED | OUTPATIENT
Start: 2023-04-13 | End: 2023-04-13 | Stop reason: HOSPADM

## 2023-04-12 RX ORDER — INSULIN LISPRO 100 [IU]/ML
0-8 INJECTION, SOLUTION INTRAVENOUS; SUBCUTANEOUS
Status: DISCONTINUED | OUTPATIENT
Start: 2023-04-13 | End: 2023-04-13 | Stop reason: HOSPADM

## 2023-04-12 RX ORDER — LISINOPRIL 20 MG/1
20 TABLET ORAL DAILY
Status: DISCONTINUED | OUTPATIENT
Start: 2023-04-13 | End: 2023-04-13 | Stop reason: HOSPADM

## 2023-04-12 RX ORDER — ONDANSETRON 2 MG/ML
4 INJECTION INTRAMUSCULAR; INTRAVENOUS EVERY 6 HOURS PRN
Status: DISCONTINUED | OUTPATIENT
Start: 2023-04-12 | End: 2023-04-13 | Stop reason: HOSPADM

## 2023-04-12 RX ORDER — DULOXETIN HYDROCHLORIDE 60 MG/1
60 CAPSULE, DELAYED RELEASE ORAL 2 TIMES DAILY
Status: DISCONTINUED | OUTPATIENT
Start: 2023-04-12 | End: 2023-04-13 | Stop reason: HOSPADM

## 2023-04-12 RX ORDER — INSULIN LISPRO 100 [IU]/ML
0-4 INJECTION, SOLUTION INTRAVENOUS; SUBCUTANEOUS NIGHTLY
Status: DISCONTINUED | OUTPATIENT
Start: 2023-04-12 | End: 2023-04-13 | Stop reason: HOSPADM

## 2023-04-12 RX ORDER — CALCIUM CARBONATE 500(1250)
1 TABLET ORAL DAILY
Status: DISCONTINUED | OUTPATIENT
Start: 2023-04-13 | End: 2023-04-13 | Stop reason: HOSPADM

## 2023-04-12 RX ORDER — ONDANSETRON 4 MG/1
4 TABLET, ORALLY DISINTEGRATING ORAL EVERY 8 HOURS PRN
Status: DISCONTINUED | OUTPATIENT
Start: 2023-04-12 | End: 2023-04-13 | Stop reason: HOSPADM

## 2023-04-12 RX ORDER — DEXTROSE MONOHYDRATE 100 MG/ML
INJECTION, SOLUTION INTRAVENOUS CONTINUOUS PRN
Status: DISCONTINUED | OUTPATIENT
Start: 2023-04-12 | End: 2023-04-13 | Stop reason: HOSPADM

## 2023-04-12 RX ORDER — ATORVASTATIN CALCIUM 10 MG/1
10 TABLET, FILM COATED ORAL NIGHTLY
Status: DISCONTINUED | OUTPATIENT
Start: 2023-04-12 | End: 2023-04-13 | Stop reason: HOSPADM

## 2023-04-12 RX ORDER — FUROSEMIDE 40 MG/1
40 TABLET ORAL EVERY OTHER DAY
Status: DISCONTINUED | OUTPATIENT
Start: 2023-04-13 | End: 2023-04-13 | Stop reason: HOSPADM

## 2023-04-12 RX ORDER — KETOROLAC TROMETHAMINE 15 MG/ML
15 INJECTION, SOLUTION INTRAMUSCULAR; INTRAVENOUS EVERY 6 HOURS PRN
Status: DISCONTINUED | OUTPATIENT
Start: 2023-04-12 | End: 2023-04-13 | Stop reason: HOSPADM

## 2023-04-12 RX ORDER — METOCLOPRAMIDE HYDROCHLORIDE 5 MG/ML
10 INJECTION INTRAMUSCULAR; INTRAVENOUS EVERY 6 HOURS PRN
Status: DISCONTINUED | OUTPATIENT
Start: 2023-04-12 | End: 2023-04-13 | Stop reason: HOSPADM

## 2023-04-12 RX ORDER — METOCLOPRAMIDE HYDROCHLORIDE 5 MG/ML
5 INJECTION INTRAMUSCULAR; INTRAVENOUS ONCE
Status: COMPLETED | OUTPATIENT
Start: 2023-04-12 | End: 2023-04-12

## 2023-04-12 RX ADMIN — APIXABAN 5 MG: 5 TABLET, FILM COATED ORAL at 22:33

## 2023-04-12 RX ADMIN — IOPAMIDOL 75 ML: 755 INJECTION, SOLUTION INTRAVENOUS at 18:45

## 2023-04-12 RX ADMIN — ATORVASTATIN CALCIUM 10 MG: 10 TABLET, FILM COATED ORAL at 22:32

## 2023-04-12 RX ADMIN — METOCLOPRAMIDE 5 MG: 5 INJECTION, SOLUTION INTRAMUSCULAR; INTRAVENOUS at 20:25

## 2023-04-12 RX ADMIN — DIPHENHYDRAMINE HYDROCHLORIDE 12.5 MG: 50 INJECTION, SOLUTION INTRAMUSCULAR; INTRAVENOUS at 20:25

## 2023-04-12 RX ADMIN — QUETIAPINE FUMARATE 100 MG: 100 TABLET ORAL at 22:33

## 2023-04-12 RX ADMIN — DULOXETINE HYDROCHLORIDE 60 MG: 60 CAPSULE, DELAYED RELEASE ORAL at 22:32

## 2023-04-12 ASSESSMENT — PAIN DESCRIPTION - LOCATION: LOCATION: HEAD

## 2023-04-12 ASSESSMENT — LIFESTYLE VARIABLES
HOW MANY STANDARD DRINKS CONTAINING ALCOHOL DO YOU HAVE ON A TYPICAL DAY: PATIENT DOES NOT DRINK
HOW OFTEN DO YOU HAVE A DRINK CONTAINING ALCOHOL: NEVER

## 2023-04-12 ASSESSMENT — PAIN SCALES - GENERAL
PAINLEVEL_OUTOF10: 0
PAINLEVEL_OUTOF10: 7

## 2023-04-12 NOTE — ED NOTES
Pt c/o 7/10 migraine at this time. Pt requesting pain meds. Gabriela Delgadillo PA-C notified at this time. See MAR for orders.       Zeinab Moreno RN  04/12/23 1944

## 2023-04-13 ENCOUNTER — APPOINTMENT (OUTPATIENT)
Dept: MRI IMAGING | Age: 78
End: 2023-04-13
Payer: MEDICARE

## 2023-04-13 VITALS
RESPIRATION RATE: 24 BRPM | WEIGHT: 181.66 LBS | HEART RATE: 84 BPM | BODY MASS INDEX: 30.27 KG/M2 | TEMPERATURE: 98.5 F | HEIGHT: 65 IN | SYSTOLIC BLOOD PRESSURE: 151 MMHG | OXYGEN SATURATION: 95 % | DIASTOLIC BLOOD PRESSURE: 77 MMHG

## 2023-04-13 LAB
ANION GAP SERPL CALCULATED.3IONS-SCNC: 10 MMOL/L (ref 3–16)
BUN SERPL-MCNC: 15 MG/DL (ref 7–20)
CALCIUM SERPL-MCNC: 9.3 MG/DL (ref 8.3–10.6)
CHLORIDE SERPL-SCNC: 105 MMOL/L (ref 99–110)
CHOLEST SERPL-MCNC: 108 MG/DL (ref 0–199)
CO2 SERPL-SCNC: 26 MMOL/L (ref 21–32)
CREAT SERPL-MCNC: 0.7 MG/DL (ref 0.6–1.2)
DEPRECATED RDW RBC AUTO: 14 % (ref 12.4–15.4)
EKG ATRIAL RATE: 68 BPM
EKG DIAGNOSIS: NORMAL
EKG P AXIS: 32 DEGREES
EKG P-R INTERVAL: 144 MS
EKG Q-T INTERVAL: 464 MS
EKG QRS DURATION: 84 MS
EKG QTC CALCULATION (BAZETT): 493 MS
EKG R AXIS: -4 DEGREES
EKG T AXIS: -23 DEGREES
EKG VENTRICULAR RATE: 68 BPM
EST. AVERAGE GLUCOSE BLD GHB EST-MCNC: 157.1 MG/DL
GFR SERPLBLD CREATININE-BSD FMLA CKD-EPI: >60 ML/MIN/{1.73_M2}
GLUCOSE BLD-MCNC: 156 MG/DL (ref 70–99)
GLUCOSE BLD-MCNC: 180 MG/DL (ref 70–99)
GLUCOSE SERPL-MCNC: 125 MG/DL (ref 70–99)
HBA1C MFR BLD: 7.1 %
HCT VFR BLD AUTO: 35 % (ref 36–48)
HDLC SERPL-MCNC: 44 MG/DL (ref 40–60)
HGB BLD-MCNC: 11.6 G/DL (ref 12–16)
LDLC SERPL CALC-MCNC: 38 MG/DL
MCH RBC QN AUTO: 30.3 PG (ref 26–34)
MCHC RBC AUTO-ENTMCNC: 33.2 G/DL (ref 31–36)
MCV RBC AUTO: 91.4 FL (ref 80–100)
PERFORMED ON: ABNORMAL
PERFORMED ON: ABNORMAL
PLATELET # BLD AUTO: 251 K/UL (ref 135–450)
PMV BLD AUTO: 8 FL (ref 5–10.5)
POTASSIUM SERPL-SCNC: 3.7 MMOL/L (ref 3.5–5.1)
RBC # BLD AUTO: 3.83 M/UL (ref 4–5.2)
SODIUM SERPL-SCNC: 141 MMOL/L (ref 136–145)
TRIGL SERPL-MCNC: 130 MG/DL (ref 0–150)
VLDLC SERPL CALC-MCNC: 26 MG/DL
WBC # BLD AUTO: 6.2 K/UL (ref 4–11)

## 2023-04-13 PROCEDURE — 2500000003 HC RX 250 WO HCPCS: Performed by: NURSE PRACTITIONER

## 2023-04-13 PROCEDURE — 85027 COMPLETE CBC AUTOMATED: CPT

## 2023-04-13 PROCEDURE — 70551 MRI BRAIN STEM W/O DYE: CPT

## 2023-04-13 PROCEDURE — 36415 COLL VENOUS BLD VENIPUNCTURE: CPT

## 2023-04-13 PROCEDURE — 97530 THERAPEUTIC ACTIVITIES: CPT

## 2023-04-13 PROCEDURE — 6370000000 HC RX 637 (ALT 250 FOR IP): Performed by: NURSE PRACTITIONER

## 2023-04-13 PROCEDURE — 96365 THER/PROPH/DIAG IV INF INIT: CPT

## 2023-04-13 PROCEDURE — 83036 HEMOGLOBIN GLYCOSYLATED A1C: CPT

## 2023-04-13 PROCEDURE — 97162 PT EVAL MOD COMPLEX 30 MIN: CPT | Performed by: PHYSICAL THERAPIST

## 2023-04-13 PROCEDURE — G0378 HOSPITAL OBSERVATION PER HR: HCPCS

## 2023-04-13 PROCEDURE — 97530 THERAPEUTIC ACTIVITIES: CPT | Performed by: PHYSICAL THERAPIST

## 2023-04-13 PROCEDURE — 93010 ELECTROCARDIOGRAM REPORT: CPT | Performed by: INTERNAL MEDICINE

## 2023-04-13 PROCEDURE — 97535 SELF CARE MNGMENT TRAINING: CPT

## 2023-04-13 PROCEDURE — 97166 OT EVAL MOD COMPLEX 45 MIN: CPT

## 2023-04-13 PROCEDURE — 2580000003 HC RX 258: Performed by: NURSE PRACTITIONER

## 2023-04-13 PROCEDURE — 80061 LIPID PANEL: CPT

## 2023-04-13 PROCEDURE — 97116 GAIT TRAINING THERAPY: CPT | Performed by: PHYSICAL THERAPIST

## 2023-04-13 PROCEDURE — 80048 BASIC METABOLIC PNL TOTAL CA: CPT

## 2023-04-13 RX ORDER — ACETAMINOPHEN 325 MG/1
650 TABLET ORAL EVERY 6 HOURS PRN
Status: DISCONTINUED | OUTPATIENT
Start: 2023-04-13 | End: 2023-04-13 | Stop reason: HOSPADM

## 2023-04-13 RX ADMIN — ASPIRIN 81 MG: 81 TABLET, COATED ORAL at 08:44

## 2023-04-13 RX ADMIN — APIXABAN 5 MG: 5 TABLET, FILM COATED ORAL at 08:44

## 2023-04-13 RX ADMIN — AMLODIPINE BESYLATE 5 MG: 5 TABLET ORAL at 08:44

## 2023-04-13 RX ADMIN — ATENOLOL 50 MG: 50 TABLET ORAL at 08:44

## 2023-04-13 RX ADMIN — DULOXETINE HYDROCHLORIDE 60 MG: 60 CAPSULE, DELAYED RELEASE ORAL at 08:44

## 2023-04-13 RX ADMIN — FUROSEMIDE 40 MG: 40 TABLET ORAL at 10:23

## 2023-04-13 RX ADMIN — LISINOPRIL 20 MG: 20 TABLET ORAL at 08:44

## 2023-04-13 RX ADMIN — VALPROATE SODIUM 500 MG: 100 INJECTION, SOLUTION INTRAVENOUS at 13:04

## 2023-04-13 RX ADMIN — CALCIUM 500 MG: 500 TABLET ORAL at 08:45

## 2023-04-13 NOTE — CARE COORDINATION
Patient discharged before being assessed by Case Management. Per chart review, patient discharged home with family. No dc needs per RN.     Electronically signed by Tracy Chu RN Case Management on 4/13/2023 at 3:24 PM

## 2023-04-13 NOTE — DISCHARGE SUMMARY
Discharge Summary    Name:  Pallavi Desai /Age/Sex: 89  (78 y.o. female)   MRN & CSN:  [id number] & [id number] Admission Date/Time: 2023  5:04 PM   Attending:  Amina eGronimo MD Discharging Physician: Amina Geronimo MD     Hospital Course:   Pallavi Desai is a 78 y.o.  female  who presents with Headache, variant migraine    78 y.o. female with PMHx of CVA, DM, HTN and HLD presented to [facility] ED with 1 week hx of migraine headache wax/wane in severity. Seen by PCP and given Toradol with some improvement earlier this week. Headache worsened again yesterday and she now reports dysarthria with slurring of words. She also reports difficulty ambulating last night which resulted in several falls (landing on buttocks) without injury. No unilateral ext weakness    Migraine with slurred speech and ataxia  Resolved   hx CVA residual symptoms   - head CT neg for acute pathology  - CTA head/neck: no flow limiting stenosis or large vessel occlusion detected within the head or neck  - MRI brain non acute   PT saw the patient and recommend outpatient therapy   Patient use walker at home   Was started on aspirin and statin  Passed swallow eval  Neuro consulted   Resume on home medication at discharge   Patient headache is much better , no focal neurological deficit     The patient expressed appropriate understanding of and agreement with the discharge recommendations, medications, and plan.      Consults this admission:  IP CONSULT TO PHARMACY  PHARMACY TO CHANGE BASE FLUIDS  IP CONSULT TO NEUROLOGY    Discharge Instruction:   Follow up appointments:   Primary care physician:  within 2 weeks    Diet:  regular diet , diabetic   Activity: activity as tolerated  Disposition: Discharged to:   []Home, [x]C, []SNF, []Acute Rehab, []Hospice   Condition on discharge: Stable    Discharge Medications:        Medication List        CONTINUE taking these medications      alendronate 70 MG

## 2023-04-13 NOTE — H&P
Hospital Medicine History & Physical      PCP: Jesus Garcia MD    Date of Admission: 4/12/2023    Date of Service: Pt seen/examined on 4/12/2023 and Placed in Observation. Chief Complaint:  headache      History Of Present Illness:      78 y.o. female with PMHx of CVA, DM, HTN and HLD presented to [facility] ED with 1 week hx of migraine headache wax/wane in severity. Seen by PCP and given Toradol with some improvement earlier this week. Headache worsened again yesterday and she now reports dysarthria with slurring of words. She also reports difficulty ambulating last night which resulted in several falls (landing on buttocks) without injury. No unilateral ext weakness    Pt was treated for pneumonia by PCP with last day of abx today. She still has some shortness of breath.     Past Medical History:          Diagnosis Date    Arthritis     Back pain     Cancer ([address]     breast, Left Lumpectomy and radiation      Cerebral artery occlusion with cerebral infarction ([address]     Diabetes     High blood pressure     Hyperlipidemia     Seizure ([address]     10 yrs ago     SVT (supraventricular tachycardia) ([address]     Type II or unspecified type diabetes mellitus without mention of complication, not stated as uncontrolled        Past Surgical History:          Procedure Laterality Date    BACK SURGERY      BREAST LUMPECTOMY Left     CARPAL TUNNEL RELEASE      left    COLONOSCOPY      HIP FRACTURE SURGERY Right 3/18/2020    RIGHT HIP GAMMA NAIL performed by Tao Mendez MD at [place] [address] ([address] [place] [address])      INTRACAPSULAR CATARACT EXTRACTION Right 3/11/2019    PHACOEMULSIFICATION WITH INTRAOCULAR LENS IMPLANT  performed by Hans Huffman MD at [address] Left 3/25/2019    PHACOEMULSIFICATION WITH INTRAOCULAR LENS IMPLANT  performed by Hans Huffman MD at [facility] [address] Left     TONSILLECTOMY

## 2023-04-13 NOTE — ED NOTES
Report called to LYNNE Elaine at this time\. All questions answered.       Onel Alas RN  04/12/23 2110

## 2023-04-13 NOTE — CONSULTS
Neurology Consult Note  Reason for Consult: history CVA with migraine 2 days, slurred speech, ataxia    Chief complaint: headache, off balance    Dr Jacky Otoole MD asked me to see Gray Blake in consultation for evaluation of history CVA with migraine 2 days, slurred speech, ataxia    History of Present Illness:  Gray Blake is a 78 y.o. female who presents with headache, feeling off balance. I obtained my information via interview w/ the patient, supplemented by chart review. The patient says that last Thursday she bent over to pick something up and fell down onto her knees w/out LOC. She did not strike her head. She started feeling off balance and developed a severe headache. She says it feel sort and infrequently has shock like pains. She does have light and sound sensitivity. She saw her PCP it appears on the 7th and she says her BP was in the 200s/100s. She says it was thought she may have pneumonia and was started on augmentin. She was also given a prescription for toradol for her HA. She was taking this daily and the medication was helping though she continued to have persistent headache. She denies any other focal neurologic symptoms during this time. She does reports a bit of speech trouble from her previous stroke but this is not new. She decided to come to the ED yesterday in order to be evaluated. Initial BP was 166/86. CT head and CTA head/neck negative. Benadryl and reglan in the ED was helpful to some extent. Currently she feels better. Her headache is now on the top of her head and 3/10. She wants to go home. She does have a hx of headaches/migraines though has not had any trouble recently. She was seen by neurology in 2019 for a small thalamic infarct. Also seen in 2022 for probably a complex migraine versus TIA. She is anticoagulated for PAF.       Medical History:  Past Medical History:   Diagnosis Date    Arthritis     Back pain

## 2023-04-13 NOTE — PROGRESS NOTES
4 Eyes Skin Assessment     NAME:  Evangelist Vuong  YOB: 1945  MEDICAL RECORD NUMBER:  [id number]    The patient is being assessed for  Admission    I agree that One RN has performed a thorough Head to Toe Skin Assessment on the patient. ALL assessment sites listed below have been assessed. Areas assessed by both nurses:    Head, Face, Ears, Shoulders, Back, Chest, Arms, Elbows, Hands, Sacrum. Buttock, Coccyx, Ischium, and Legs. Feet and Heels        Does the Patient have a Wound?  No noted wound(s)       Александр Prevention initiated by RN: NA   Wound Care Orders initiated by RN: NA    Pressure Injury (Stage 3,4, Unstageable, DTI, NWPT, and Complex wounds) if present, place referral order by RN under : NA    New and Established Ostomies, if present place, referral order under : NA      Nurse 1 eSignature: Electronically signed by Paula Damon RN on 4/13/23 at 12:15 AM EDT    **SHARE this note so that the co-signing nurse can place an eSignature**    Nurse 2 eSignature: Electronically signed by Vargas Art RN on 4/13/23 at 12:31 AM EDT
Admitted to 5106 from ED for stroke workup due to weakness, migraines, and slurred speech. See assessment. Oriented to room, unit routine, safety protocol, plan of care, and stroke education started. Denies pain at this time. Swallow screen negative and snack provided at this time. Slade Warren, NP up to assess at this time. Roxy Lizarraga RN
Discharge instructions reviewed with pt/family, discussed medications, VU, denies any further questions or anxiety related to discharge. Educational handouts in regards to new medications, given via [employer], side effects discussed, VU. IV/tele discontinued. Pt discharged to home with son. Taken from room via wheelchair by son, personal belongings taken with. Follow up appointment made with PCP for patient. New medications supplied through outpatient pharmacy.       .Electronically signed by Evette Simon RN on 4/13/2023 at 3:16 PM
NAME:  Ana Babb  YOB: 1945  MEDICAL RECORD NUMBER:  [id number]  TODAYS DATE:  4/13/2023    Discussed personal risk factors for Stroke/TIA with patient/family, and ways to reduce the risk for a recurrent stroke. Patient's personal risk factors which were identified are:     [] Alcohol Abuse: check with your physician before any alcohol consumption. [] Atrial fibrillation: may cause blood clots. [] Drug Abuse: Seek help, talk with your doctor  [] Clotting Disorder  [] Diabetes  [] Family history of stroke or heart disease  [x] High Blood Pressure/Hypertension: work with your physician. [x] High cholesterol: monitor cholesterol levels with your physician.   [] Overweight/Obesity: work with your physician for your ideal body weight. [x] Physical Inactivity: get regular exercise as directed by your physician. [x] Personal history of previous TIA or stroke  [x] Poor Diet; decrease salt (sodium) in your diet, follow diet directed by physician. [] Smoking: Cigarette/Cigar: stop smoking. Advised pt. that you can reduce your risk for stroke/TIA by modifying/controlling the risk factors that you have. Pt.advised to take the medications as prescribed, which will be detailed in the discharge instructions, and to not stop taking them without consulting their physician. In addition, pt. advised to maintain a healthy diet, exercise regularly and to not smoke. [facility]'s Stroke treatment and prevention, Managing your recovery  notebook  provided and/or reviewed  with patient/family. The notebook includes, but not limited to, sections addressing warning signs & symptoms of a stroke, which are: sudden numbness or weakness especially on one side of the body, sudden confusion, difficulty speaking or understanding, sudden changes in vision, sudden dizziness or loss of balance/ coordination, sudden severe headache, syncope and seizure.   The need to call EMS (911) immediately if signs &
Occupational Therapy  Facility/Department: [facility] 5W PROGRESSIVE CARE  Occupational Therapy Initial Assessment  Should patient be discharged prior to another treatment session, this note shall serve as the discharge summary. Name: Phi Avery  : [id number]  MRN: [id number]  Date of Service: 2023    Discharge Recommendations:  Continue to assess pending progress, 3-5 sessions per week, Patient would benefit from continued therapy after discharge  OT Equipment Recommendations  Other: TBD at next level of care       Patient Diagnosis(es): The primary encounter diagnosis was Slurred speech. Diagnoses of Difficulty in walking and Frequent falls were also pertinent to this visit. Past Medical History:  has a past medical history of Arthritis, Back pain, Cancer ([address] Cerebral artery occlusion with cerebral infarction ([address] Diabetes, High blood pressure, Hyperlipidemia, Seizure ([address] SVT (supraventricular tachycardia) ([address] and Type II or unspecified type diabetes mellitus without mention of complication, not stated as uncontrolled. Past Surgical History:  has a past surgical history that includes back surgery; Carpal tunnel release; Hysterectomy; Tonsillectomy; lymph node biopsy (Left); Colonoscopy; Intracapsular cataract extraction (Right, 3/11/2019); Breast lumpectomy (Left); Intracapsular cataract extraction (Left, 3/25/2019); and Hip fracture surgery (Right, 3/18/2020). Assessment   Performance deficits / Impairments: Decreased functional mobility ; Decreased balance;Decreased ADL status; Decreased endurance;Decreased high-level IADLs  Assessment: Pt presents with migraine and ataxia and slurred speech, medical work up ongoing. Pt lives with dtr who works full time. Pt was independent with functional mobility and self care prior to admission. Today she required min A for LB dressing and CGA for functional mobility, she had one significant LOB with min A to recover.   Recommend cont OT to
SLP NOTE    8:30 AM  SLP eval/tx order received per stroke r/o protocol. Patient met at bedside. SLP role/evaluation objectives discussed with patient; patient denies motor speech, receptive/expressive/cognitive language, and/or swallowing changes at this time. Patient politely requests to hold SLP eval at this time with plan to pursue pending medical work-up this date. ST to re-attempt as schedule permits pending medical work-up unless otherwise notified. 10:26 AM  Evaluation re-attempted. Patient reports medical work-up progressing and denies need for evaluation at this time. Thank you. Angelita Laura, #[id number]  Speech-Language Pathologist  Portable phone: [phone]
tachycardia) [address] [address] and Type II or unspecified type diabetes mellitus without mention of complication, not stated as uncontrolled. Past Surgical History:  has a past surgical history that includes back surgery; Carpal tunnel release; Hysterectomy; Tonsillectomy; lymph node biopsy (Left); Colonoscopy; Intracapsular cataract extraction (Right, 3/11/2019); Breast lumpectomy (Left); Intracapsular cataract extraction (Left, 3/25/2019); and Hip fracture surgery (Right, 3/18/2020). Assessment   Body Structures, Functions, Activity Limitations Requiring Skilled Therapeutic Intervention: Decreased functional mobility ; Decreased balance  Assessment: Pt is a 77 yo female who was adm to [facility] with headache; difficulty walking with a recent fall; pt at baseline is Ind with functional mobility tasks; pt currently needing CGA/min A for mobility tasks and is home alone during the day; pt will benefit from continued therapy 5-7x/wk to address deficits to decrease pt's fall risk  Therapy Prognosis: Good  Decision Making: Medium Complexity  Requires PT Follow-Up: Yes  Activity Tolerance  Activity Tolerance: Patient tolerated treatment well     Plan   Physcial Therapy Plan  General Plan: 3-5 times per week  Current Treatment Recommendations: Functional mobility training, Transfer training, Balance training, Gait training, Strengthening  Safety Devices  Type of Devices: All fall risk precautions in place, Call light within reach, Chair alarm in place, Left in chair     Restrictions  Restrictions/Precautions  Restrictions/Precautions: Fall Risk     Subjective   General  Chart Reviewed: Yes  Patient assessed for rehabilitation services?: Yes  Additional Pertinent Hx: per H&P note:\"77 y.o. female with PMHx of CVA, DM, HTN and HLD presented to [facility] ED with 1 week hx of migraine headache wax/wane in severity. Seen by PCP and given Toradol with some improvement earlier this week.   Headache worsened again yesterday and she now

## 2023-04-13 NOTE — ED PROVIDER NOTES
[address]        Pt Name: Dennis John  MRN: [id number]  [place] [birth year]  Date of evaluation: 4/12/2023  Provider: Kin Carbajal PA-C  PCP: Luis Sharma MD  Note Started: 7:06 PM EDT 4/12/23       I have seen and evaluated this patient with my supervising physician Brian Bailey MD.      [address] [place] [address]       Chief Complaint   Patient presents with    Shortness of Breath     Pt to ED with c/o sob    Migraine    Fatigue     Pt sent to ED by her PCP d/t increased sob and fatigue. Pt states she was dx with pneumonia on Friday. States since then her sob and fatigue have became worse. Pt states she has also been getting intermittent migraines since then and thinks \"she may have one coming on\". HISTORY OF PRESENT ILLNESS: 1 or more Elements             Dennis John is a 78 y.o. female who presents to the emergency department with complaint of a migraine that started yesterday. Says the last time she had a migraine that she had a TIA around Christmas. Says that she was given a Toradol injection by her doctor earlier in the week after she is diagnosed with pneumonia complaining of right hip pain. Yesterday the migraine worsened again when she woke up. She said she associated difficulty getting her speech out. She felt like her mind was going faster than her words to come out  As the day went on her symptoms worsen. Around 9 PM she did have difficulty ambulating. She called her doctor wanted her to come to the emergency department for further evaluation. She did not come in. When she woke up this morning she says that she slid out of bed to the ground. She slowly was able to push herself up. She was her self to walk up and down her condo using help until her symptoms improved. She says that the ambulation is somewhat better here, but not back to baseline.   She says that her speech is a little bit different from her
pg/mL   Troponin   Result Value Ref Range    Troponin <0.01 <0.01 ng/mL   Protime-INR   Result Value Ref Range    Protime 12.8 11.5 - 14.8 sec    INR 0.96 0.84 - 1.16   POCT Glucose   Result Value Ref Range    POC Glucose 129 (H) 70 - 99 mg/dl    Performed on ACCU-CHEK    POCT Glucose   Result Value Ref Range    POC Glucose 125 (H) 70 - 99 mg/dl    Performed on ACCU-CHEK    XR CHEST (2 VW)    Result Date: 4/12/2023  EXAMINATION: TWO XRAY VIEWS OF THE CHEST 4/12/2023 5:40 pm COMPARISON: 08/07/2020 HISTORY: ORDERING SYSTEM PROVIDED HISTORY: Shortness of breath TECHNOLOGIST PROVIDED HISTORY: Reason for exam:->Shortness of breath Reason for Exam: SOB FINDINGS: Loop recorder left chest.  Normal heart size and pulmonary vasculature. No focal consolidations, pleural effusions, or pneumothorax. No evidence of acute process. CT HEAD WO CONTRAST    Result Date: 4/12/2023  EXAMINATION: CTA OF THE HEAD AND NECK WITH CONTRAST; CT OF THE HEAD WITHOUT CONTRAST 4/12/2023 6:40 pm: TECHNIQUE: CTA of the head and neck was performed with the administration of intravenous contrast. Multiplanar reformatted images are provided for review. MIP images are provided for review. Stenosis of the internal carotid arteries measured using NASCET criteria. Automated exposure control, iterative reconstruction, and/or weight based adjustment of the mA/kV was utilized to reduce the radiation dose to as low as reasonably achievable.; CT of the head was performed without the administration of intravenous contrast. Automated exposure control, iterative reconstruction, and/or weight based adjustment of the mA/kV was utilized to reduce the radiation dose to as low as reasonably achievable. COMPARISON: 07/06/2022 HISTORY: ORDERING SYSTEM PROVIDED HISTORY: change in speech yesterday, change in gait TECHNOLOGIST PROVIDED HISTORY: Reason for exam:->change in speech yesterday, change in gait Has a \"code stroke\" or \"stroke alert\" been called? ->No

## 2023-10-04 ENCOUNTER — HOSPITAL ENCOUNTER (INPATIENT)
Age: 78
LOS: 4 days | Discharge: HOME | DRG: 896 | End: 2023-10-10
Attending: EMERGENCY MEDICINE | Admitting: INTERNAL MEDICINE
Payer: MEDICARE

## 2023-10-04 ENCOUNTER — APPOINTMENT (OUTPATIENT)
Dept: CT IMAGING | Age: 78
DRG: 896 | End: 2023-10-04
Payer: MEDICARE

## 2023-10-04 ENCOUNTER — APPOINTMENT (OUTPATIENT)
Dept: GENERAL RADIOLOGY | Age: 78
DRG: 896 | End: 2023-10-04
Payer: MEDICARE

## 2023-10-04 DIAGNOSIS — R41.82 ALTERED MENTAL STATUS, UNSPECIFIED ALTERED MENTAL STATUS TYPE: Primary | ICD-10-CM

## 2023-10-04 DIAGNOSIS — R44.3 HALLUCINATIONS: ICD-10-CM

## 2023-10-04 DIAGNOSIS — Z86.73 H/O: CVA (CEREBROVASCULAR ACCIDENT): ICD-10-CM

## 2023-10-04 DIAGNOSIS — J32.3 SPHENOID SINUSITIS, UNSPECIFIED CHRONICITY: ICD-10-CM

## 2023-10-04 DIAGNOSIS — G89.4 CHRONIC PAIN SYNDROME: ICD-10-CM

## 2023-10-04 LAB
ALBUMIN SERPL-MCNC: 4.2 G/DL (ref 3.4–5)
ALBUMIN/GLOB SERPL: 1.5 {RATIO} (ref 1.1–2.2)
ALP SERPL-CCNC: 54 U/L (ref 40–129)
ALT SERPL-CCNC: 45 U/L (ref 10–40)
ANION GAP SERPL CALCULATED.3IONS-SCNC: 11 MMOL/L (ref 3–16)
AST SERPL-CCNC: 37 U/L (ref 15–37)
BACTERIA URNS QL MICRO: NORMAL /HPF
BASOPHILS # BLD: 0.1 K/UL (ref 0–0.2)
BASOPHILS NFR BLD: 1.3 %
BILIRUB SERPL-MCNC: 0.3 MG/DL (ref 0–1)
BILIRUB UR QL STRIP.AUTO: NEGATIVE
BUN SERPL-MCNC: 10 MG/DL (ref 7–20)
CALCIUM SERPL-MCNC: 9.8 MG/DL (ref 8.3–10.6)
CHLORIDE SERPL-SCNC: 95 MMOL/L (ref 99–110)
CLARITY UR: CLEAR
CO2 SERPL-SCNC: 24 MMOL/L (ref 21–32)
COLOR UR: YELLOW
CREAT SERPL-MCNC: 0.6 MG/DL (ref 0.6–1.2)
DEPRECATED RDW RBC AUTO: 13.1 % (ref 12.4–15.4)
EOSINOPHIL # BLD: 0.2 K/UL (ref 0–0.6)
EOSINOPHIL NFR BLD: 2.3 %
EPI CELLS #/AREA URNS AUTO: 0 /HPF (ref 0–5)
GFR SERPLBLD CREATININE-BSD FMLA CKD-EPI: >60 ML/MIN/{1.73_M2}
GLUCOSE SERPL-MCNC: 194 MG/DL (ref 70–99)
GLUCOSE UR STRIP.AUTO-MCNC: NEGATIVE MG/DL
HCT VFR BLD AUTO: 40 % (ref 36–48)
HGB BLD-MCNC: 13.9 G/DL (ref 12–16)
HGB UR QL STRIP.AUTO: NEGATIVE
HYALINE CASTS #/AREA URNS AUTO: 0 /LPF (ref 0–8)
KETONES UR STRIP.AUTO-MCNC: NEGATIVE MG/DL
LEUKOCYTE ESTERASE UR QL STRIP.AUTO: ABNORMAL
LYMPHOCYTES # BLD: 2 K/UL (ref 1–5.1)
LYMPHOCYTES NFR BLD: 27.9 %
MCH RBC QN AUTO: 31.7 PG (ref 26–34)
MCHC RBC AUTO-ENTMCNC: 34.8 G/DL (ref 31–36)
MCV RBC AUTO: 91.3 FL (ref 80–100)
MONOCYTES # BLD: 0.6 K/UL (ref 0–1.3)
MONOCYTES NFR BLD: 8.3 %
NEUTROPHILS # BLD: 4.3 K/UL (ref 1.7–7.7)
NEUTROPHILS NFR BLD: 60.2 %
NITRITE UR QL STRIP.AUTO: NEGATIVE
PH UR STRIP.AUTO: 6 [PH] (ref 5–8)
PLATELET # BLD AUTO: 229 K/UL (ref 135–450)
PMV BLD AUTO: 8 FL (ref 5–10.5)
POTASSIUM SERPL-SCNC: 3.6 MMOL/L (ref 3.5–5.1)
PROT SERPL-MCNC: 7 G/DL (ref 6.4–8.2)
PROT UR STRIP.AUTO-MCNC: NEGATIVE MG/DL
RBC # BLD AUTO: 4.39 M/UL (ref 4–5.2)
RBC CLUMPS #/AREA URNS AUTO: 0 /HPF (ref 0–4)
SODIUM SERPL-SCNC: 130 MMOL/L (ref 136–145)
SP GR UR STRIP.AUTO: 1 (ref 1–1.03)
TROPONIN, HIGH SENSITIVITY: 9 NG/L (ref 0–14)
UA COMPLETE W REFLEX CULTURE PNL UR: ABNORMAL
UA DIPSTICK W REFLEX MICRO PNL UR: YES
URN SPEC COLLECT METH UR: ABNORMAL
UROBILINOGEN UR STRIP-ACNC: 0.2 E.U./DL
WBC # BLD AUTO: 7.2 K/UL (ref 4–11)
WBC #/AREA URNS AUTO: 2 /HPF (ref 0–5)

## 2023-10-04 PROCEDURE — 70450 CT HEAD/BRAIN W/O DYE: CPT

## 2023-10-04 PROCEDURE — 93005 ELECTROCARDIOGRAM TRACING: CPT | Performed by: EMERGENCY MEDICINE

## 2023-10-04 PROCEDURE — 80307 DRUG TEST PRSMV CHEM ANLYZR: CPT

## 2023-10-04 PROCEDURE — 80053 COMPREHEN METABOLIC PANEL: CPT

## 2023-10-04 PROCEDURE — 99285 EMERGENCY DEPT VISIT HI MDM: CPT

## 2023-10-04 PROCEDURE — 84484 ASSAY OF TROPONIN QUANT: CPT

## 2023-10-04 PROCEDURE — 81001 URINALYSIS AUTO W/SCOPE: CPT

## 2023-10-04 PROCEDURE — 85025 COMPLETE CBC W/AUTO DIFF WBC: CPT

## 2023-10-04 PROCEDURE — 71045 X-RAY EXAM CHEST 1 VIEW: CPT

## 2023-10-04 ASSESSMENT — PAIN - FUNCTIONAL ASSESSMENT: PAIN_FUNCTIONAL_ASSESSMENT: NONE - DENIES PAIN

## 2023-10-05 ENCOUNTER — APPOINTMENT (OUTPATIENT)
Dept: CT IMAGING | Age: 78
DRG: 896 | End: 2023-10-05
Payer: MEDICARE

## 2023-10-05 ENCOUNTER — APPOINTMENT (OUTPATIENT)
Dept: MRI IMAGING | Age: 78
DRG: 896 | End: 2023-10-05
Payer: MEDICARE

## 2023-10-05 PROBLEM — R44.3 HALLUCINATIONS: Status: ACTIVE | Noted: 2023-10-05

## 2023-10-05 PROBLEM — E11.9 DIABETES MELLITUS (HCC): Status: ACTIVE | Noted: 2023-10-05

## 2023-10-05 PROBLEM — J32.3 SPHENOID SINUSITIS: Status: ACTIVE | Noted: 2023-10-05

## 2023-10-05 PROBLEM — R41.82 ALTERED MENTAL STATUS: Status: ACTIVE | Noted: 2023-10-05

## 2023-10-05 PROBLEM — Z86.73 H/O: CVA (CEREBROVASCULAR ACCIDENT): Status: ACTIVE | Noted: 2023-10-05

## 2023-10-05 PROBLEM — Z79.01 CHRONIC ANTICOAGULATION: Status: ACTIVE | Noted: 2023-10-05

## 2023-10-05 PROBLEM — J32.3 SINUSITIS CHRONIC, SPHENOIDAL: Status: ACTIVE | Noted: 2023-10-05

## 2023-10-05 PROBLEM — E87.20 LACTIC ACID ACIDOSIS: Status: ACTIVE | Noted: 2023-10-05

## 2023-10-05 LAB
AMPHETAMINES UR QL SCN>1000 NG/ML: ABNORMAL
BARBITURATES UR QL SCN>200 NG/ML: ABNORMAL
BENZODIAZ UR QL SCN>200 NG/ML: POSITIVE
CANNABINOIDS UR QL SCN>50 NG/ML: ABNORMAL
COCAINE UR QL SCN: ABNORMAL
DRUG SCREEN COMMENT UR-IMP: ABNORMAL
EKG ATRIAL RATE: 78 BPM
EKG DIAGNOSIS: NORMAL
EKG P AXIS: 41 DEGREES
EKG P-R INTERVAL: 178 MS
EKG Q-T INTERVAL: 436 MS
EKG QRS DURATION: 88 MS
EKG QTC CALCULATION (BAZETT): 497 MS
EKG R AXIS: -4 DEGREES
EKG T AXIS: -5 DEGREES
EKG VENTRICULAR RATE: 78 BPM
FENTANYL SCREEN, URINE: ABNORMAL
GLUCOSE BLD-MCNC: 129 MG/DL (ref 70–99)
GLUCOSE BLD-MCNC: 150 MG/DL (ref 70–99)
GLUCOSE BLD-MCNC: 185 MG/DL (ref 70–99)
GLUCOSE BLD-MCNC: 190 MG/DL (ref 70–99)
LACTATE BLDV-SCNC: 2 MMOL/L (ref 0.4–2)
LACTATE BLDV-SCNC: 2.5 MMOL/L (ref 0.4–1.9)
LACTATE BLDV-SCNC: 2.5 MMOL/L (ref 0.4–2)
LACTATE BLDV-SCNC: 2.9 MMOL/L (ref 0.4–2)
LACTATE BLDV-SCNC: 3.5 MMOL/L (ref 0.4–2)
METHADONE UR QL SCN>300 NG/ML: ABNORMAL
OPIATES UR QL SCN>300 NG/ML: ABNORMAL
OXYCODONE UR QL SCN: ABNORMAL
PCP UR QL SCN>25 NG/ML: ABNORMAL
PERFORMED ON: ABNORMAL
PH UR STRIP: 6 [PH]

## 2023-10-05 PROCEDURE — 2580000003 HC RX 258: Performed by: STUDENT IN AN ORGANIZED HEALTH CARE EDUCATION/TRAINING PROGRAM

## 2023-10-05 PROCEDURE — 96361 HYDRATE IV INFUSION ADD-ON: CPT

## 2023-10-05 PROCEDURE — 6360000002 HC RX W HCPCS: Performed by: STUDENT IN AN ORGANIZED HEALTH CARE EDUCATION/TRAINING PROGRAM

## 2023-10-05 PROCEDURE — 70486 CT MAXILLOFACIAL W/O DYE: CPT

## 2023-10-05 PROCEDURE — 99222 1ST HOSP IP/OBS MODERATE 55: CPT | Performed by: STUDENT IN AN ORGANIZED HEALTH CARE EDUCATION/TRAINING PROGRAM

## 2023-10-05 PROCEDURE — 94760 N-INVAS EAR/PLS OXIMETRY 1: CPT

## 2023-10-05 PROCEDURE — 87040 BLOOD CULTURE FOR BACTERIA: CPT

## 2023-10-05 PROCEDURE — 96366 THER/PROPH/DIAG IV INF ADDON: CPT

## 2023-10-05 PROCEDURE — 99222 1ST HOSP IP/OBS MODERATE 55: CPT | Performed by: INTERNAL MEDICINE

## 2023-10-05 PROCEDURE — G0378 HOSPITAL OBSERVATION PER HR: HCPCS

## 2023-10-05 PROCEDURE — A9577 INJ MULTIHANCE: HCPCS | Performed by: STUDENT IN AN ORGANIZED HEALTH CARE EDUCATION/TRAINING PROGRAM

## 2023-10-05 PROCEDURE — 36415 COLL VENOUS BLD VENIPUNCTURE: CPT

## 2023-10-05 PROCEDURE — 6370000000 HC RX 637 (ALT 250 FOR IP): Performed by: STUDENT IN AN ORGANIZED HEALTH CARE EDUCATION/TRAINING PROGRAM

## 2023-10-05 PROCEDURE — 96365 THER/PROPH/DIAG IV INF INIT: CPT

## 2023-10-05 PROCEDURE — 6360000004 HC RX CONTRAST MEDICATION: Performed by: STUDENT IN AN ORGANIZED HEALTH CARE EDUCATION/TRAINING PROGRAM

## 2023-10-05 PROCEDURE — 83605 ASSAY OF LACTIC ACID: CPT

## 2023-10-05 PROCEDURE — 99222 1ST HOSP IP/OBS MODERATE 55: CPT | Performed by: REGISTERED NURSE

## 2023-10-05 PROCEDURE — 70553 MRI BRAIN STEM W/O & W/DYE: CPT

## 2023-10-05 PROCEDURE — 93010 ELECTROCARDIOGRAM REPORT: CPT | Performed by: INTERNAL MEDICINE

## 2023-10-05 RX ORDER — ALPRAZOLAM 0.5 MG/1
1 TABLET ORAL 2 TIMES DAILY PRN
Status: DISCONTINUED | OUTPATIENT
Start: 2023-10-05 | End: 2023-10-05

## 2023-10-05 RX ORDER — ACETAMINOPHEN 650 MG/1
650 SUPPOSITORY RECTAL EVERY 6 HOURS PRN
Status: DISCONTINUED | OUTPATIENT
Start: 2023-10-05 | End: 2023-10-10 | Stop reason: HOSPADM

## 2023-10-05 RX ORDER — AMLODIPINE BESYLATE 5 MG/1
5 TABLET ORAL DAILY
Status: DISCONTINUED | OUTPATIENT
Start: 2023-10-05 | End: 2023-10-06

## 2023-10-05 RX ORDER — POLYETHYLENE GLYCOL 3350 17 G/17G
17 POWDER, FOR SOLUTION ORAL DAILY PRN
Status: DISCONTINUED | OUTPATIENT
Start: 2023-10-05 | End: 2023-10-10 | Stop reason: HOSPADM

## 2023-10-05 RX ORDER — LISINOPRIL 10 MG/1
20 TABLET ORAL DAILY
Status: DISCONTINUED | OUTPATIENT
Start: 2023-10-05 | End: 2023-10-10 | Stop reason: HOSPADM

## 2023-10-05 RX ORDER — SODIUM CHLORIDE 0.9 % (FLUSH) 0.9 %
5-40 SYRINGE (ML) INJECTION EVERY 12 HOURS SCHEDULED
Status: DISCONTINUED | OUTPATIENT
Start: 2023-10-05 | End: 2023-10-10 | Stop reason: HOSPADM

## 2023-10-05 RX ORDER — PANTOPRAZOLE SODIUM 40 MG/1
40 TABLET, DELAYED RELEASE ORAL
Status: DISCONTINUED | OUTPATIENT
Start: 2023-10-05 | End: 2023-10-10 | Stop reason: HOSPADM

## 2023-10-05 RX ORDER — HYDROCODONE BITARTRATE AND ACETAMINOPHEN 7.5; 325 MG/1; MG/1
1 TABLET ORAL EVERY 8 HOURS PRN
Status: ON HOLD | COMMUNITY
Start: 2023-09-05 | End: 2023-10-08 | Stop reason: SDUPTHER

## 2023-10-05 RX ORDER — SEMAGLUTIDE 1.34 MG/ML
1 INJECTION, SOLUTION SUBCUTANEOUS WEEKLY
Status: ON HOLD | COMMUNITY
Start: 2023-09-05 | End: 2023-10-06 | Stop reason: HOSPADM

## 2023-10-05 RX ORDER — QUETIAPINE FUMARATE 25 MG/1
100 TABLET, FILM COATED ORAL NIGHTLY
Status: DISCONTINUED | OUTPATIENT
Start: 2023-10-05 | End: 2023-10-10 | Stop reason: HOSPADM

## 2023-10-05 RX ORDER — ONDANSETRON 4 MG/1
4 TABLET, ORALLY DISINTEGRATING ORAL EVERY 8 HOURS PRN
Status: DISCONTINUED | OUTPATIENT
Start: 2023-10-05 | End: 2023-10-10 | Stop reason: HOSPADM

## 2023-10-05 RX ORDER — SODIUM CHLORIDE, SODIUM LACTATE, POTASSIUM CHLORIDE, AND CALCIUM CHLORIDE .6; .31; .03; .02 G/100ML; G/100ML; G/100ML; G/100ML
1000 INJECTION, SOLUTION INTRAVENOUS ONCE
Status: COMPLETED | OUTPATIENT
Start: 2023-10-05 | End: 2023-10-05

## 2023-10-05 RX ORDER — AMOXICILLIN AND CLAVULANATE POTASSIUM 875; 125 MG/1; MG/1
1 TABLET, FILM COATED ORAL EVERY 12 HOURS SCHEDULED
Status: DISCONTINUED | OUTPATIENT
Start: 2023-10-05 | End: 2023-10-05

## 2023-10-05 RX ORDER — ACETAMINOPHEN 325 MG/1
650 TABLET ORAL EVERY 6 HOURS PRN
Status: DISCONTINUED | OUTPATIENT
Start: 2023-10-05 | End: 2023-10-10 | Stop reason: HOSPADM

## 2023-10-05 RX ORDER — SODIUM CHLORIDE 9 MG/ML
INJECTION, SOLUTION INTRAVENOUS PRN
Status: DISCONTINUED | OUTPATIENT
Start: 2023-10-05 | End: 2023-10-10 | Stop reason: HOSPADM

## 2023-10-05 RX ORDER — INSULIN LISPRO 100 [IU]/ML
0-4 INJECTION, SOLUTION INTRAVENOUS; SUBCUTANEOUS NIGHTLY
Status: DISCONTINUED | OUTPATIENT
Start: 2023-10-05 | End: 2023-10-10 | Stop reason: HOSPADM

## 2023-10-05 RX ORDER — LANOLIN ALCOHOL/MO/W.PET/CERES
9 CREAM (GRAM) TOPICAL EVERY EVENING
Status: DISCONTINUED | OUTPATIENT
Start: 2023-10-05 | End: 2023-10-10 | Stop reason: HOSPADM

## 2023-10-05 RX ORDER — ATORVASTATIN CALCIUM 10 MG/1
10 TABLET, FILM COATED ORAL NIGHTLY
Status: DISCONTINUED | OUTPATIENT
Start: 2023-10-05 | End: 2023-10-06

## 2023-10-05 RX ORDER — ATENOLOL 50 MG/1
50 TABLET ORAL DAILY
Status: DISCONTINUED | OUTPATIENT
Start: 2023-10-05 | End: 2023-10-10 | Stop reason: HOSPADM

## 2023-10-05 RX ORDER — FUROSEMIDE 40 MG/1
40 TABLET ORAL EVERY OTHER DAY
Status: DISCONTINUED | OUTPATIENT
Start: 2023-10-05 | End: 2023-10-07

## 2023-10-05 RX ORDER — ARIPIPRAZOLE 2 MG/1
2 TABLET ORAL DAILY
Status: ON HOLD | COMMUNITY
Start: 2023-09-05 | End: 2023-10-06 | Stop reason: HOSPADM

## 2023-10-05 RX ORDER — DEXTROSE MONOHYDRATE 100 MG/ML
INJECTION, SOLUTION INTRAVENOUS CONTINUOUS PRN
Status: DISCONTINUED | OUTPATIENT
Start: 2023-10-05 | End: 2023-10-10 | Stop reason: HOSPADM

## 2023-10-05 RX ORDER — ONDANSETRON 2 MG/ML
4 INJECTION INTRAMUSCULAR; INTRAVENOUS EVERY 6 HOURS PRN
Status: DISCONTINUED | OUTPATIENT
Start: 2023-10-05 | End: 2023-10-10 | Stop reason: HOSPADM

## 2023-10-05 RX ORDER — SODIUM CHLORIDE 0.9 % (FLUSH) 0.9 %
5-40 SYRINGE (ML) INJECTION PRN
Status: DISCONTINUED | OUTPATIENT
Start: 2023-10-05 | End: 2023-10-10 | Stop reason: HOSPADM

## 2023-10-05 RX ORDER — SODIUM CHLORIDE 9 MG/ML
INJECTION, SOLUTION INTRAVENOUS CONTINUOUS
Status: ACTIVE | OUTPATIENT
Start: 2023-10-05 | End: 2023-10-06

## 2023-10-05 RX ORDER — INSULIN LISPRO 100 [IU]/ML
0-4 INJECTION, SOLUTION INTRAVENOUS; SUBCUTANEOUS
Status: DISCONTINUED | OUTPATIENT
Start: 2023-10-05 | End: 2023-10-10 | Stop reason: HOSPADM

## 2023-10-05 RX ORDER — ALPRAZOLAM 0.5 MG/1
0.5 TABLET ORAL 2 TIMES DAILY PRN
Status: DISCONTINUED | OUTPATIENT
Start: 2023-10-05 | End: 2023-10-10 | Stop reason: HOSPADM

## 2023-10-05 RX ORDER — DULOXETIN HYDROCHLORIDE 60 MG/1
60 CAPSULE, DELAYED RELEASE ORAL 2 TIMES DAILY
Status: DISCONTINUED | OUTPATIENT
Start: 2023-10-05 | End: 2023-10-10 | Stop reason: HOSPADM

## 2023-10-05 RX ORDER — TRAZODONE HYDROCHLORIDE 50 MG/1
50 TABLET ORAL NIGHTLY
Status: DISCONTINUED | OUTPATIENT
Start: 2023-10-05 | End: 2023-10-05

## 2023-10-05 RX ADMIN — QUETIAPINE FUMARATE 100 MG: 25 TABLET ORAL at 21:43

## 2023-10-05 RX ADMIN — SODIUM CHLORIDE, POTASSIUM CHLORIDE, SODIUM LACTATE AND CALCIUM CHLORIDE 1000 ML: 600; 310; 30; 20 INJECTION, SOLUTION INTRAVENOUS at 02:48

## 2023-10-05 RX ADMIN — SODIUM CHLORIDE: 9 INJECTION, SOLUTION INTRAVENOUS at 07:55

## 2023-10-05 RX ADMIN — AMPHOTERICIN B 424 MG: 50 INJECTION, POWDER, LYOPHILIZED, FOR SOLUTION INTRAVENOUS at 05:43

## 2023-10-05 RX ADMIN — DULOXETINE HYDROCHLORIDE 60 MG: 60 CAPSULE, DELAYED RELEASE ORAL at 09:04

## 2023-10-05 RX ADMIN — Medication 10 ML: at 09:04

## 2023-10-05 RX ADMIN — GADOBENATE DIMEGLUMINE 17 ML: 529 INJECTION, SOLUTION INTRAVENOUS at 10:10

## 2023-10-05 RX ADMIN — ATENOLOL 50 MG: 50 TABLET ORAL at 09:03

## 2023-10-05 RX ADMIN — DULOXETINE HYDROCHLORIDE 60 MG: 60 CAPSULE, DELAYED RELEASE ORAL at 21:43

## 2023-10-05 RX ADMIN — PANTOPRAZOLE SODIUM 40 MG: 40 TABLET, DELAYED RELEASE ORAL at 09:03

## 2023-10-05 RX ADMIN — AMOXICILLIN AND CLAVULANATE POTASSIUM 1 TABLET: 875; 125 TABLET, FILM COATED ORAL at 09:03

## 2023-10-05 RX ADMIN — LISINOPRIL 20 MG: 10 TABLET ORAL at 09:03

## 2023-10-05 ASSESSMENT — PAIN DESCRIPTION - LOCATION: LOCATION: BACK

## 2023-10-05 ASSESSMENT — PAIN DESCRIPTION - DESCRIPTORS: DESCRIPTORS: ACHING

## 2023-10-05 ASSESSMENT — PAIN SCALES - GENERAL: PAINLEVEL_OUTOF10: 3

## 2023-10-05 ASSESSMENT — PAIN DESCRIPTION - FREQUENCY: FREQUENCY: INTERMITTENT

## 2023-10-05 ASSESSMENT — PAIN DESCRIPTION - ONSET: ONSET: ON-GOING

## 2023-10-05 ASSESSMENT — PAIN DESCRIPTION - ORIENTATION: ORIENTATION: LOWER;MID

## 2023-10-05 ASSESSMENT — PAIN DESCRIPTION - PAIN TYPE: TYPE: CHRONIC PAIN

## 2023-10-05 ASSESSMENT — PAIN - FUNCTIONAL ASSESSMENT: PAIN_FUNCTIONAL_ASSESSMENT: ACTIVITIES ARE NOT PREVENTED

## 2023-10-05 NOTE — PROGRESS NOTES
Report from [facility] in ED. Patient is oriented to self and situation. Pt  was unable to answer admission questions, pt states her daughter will visit later today.

## 2023-10-05 NOTE — ED PROVIDER NOTES
[address] [facility]    Pt Name: Grant Reina   MRN: [id number]   [address] [facility] [birth year][facility][birth year]   Date of evaluation: 10/4/2023   Provider: Jc Ware MD   PCP: Carlos Maddox   Note Started: 11:21 PM EDT 10/4/23       CHIEF COMPLAINT  Hallucinations (Pt came in by squad after her daughter called for pt having visual hallucinations that started 1 hour ago), Aphasia, and Altered Mental Status       HISTORY OF PRESENT ILLNESS  Grant Reina is a 70 y.o. female who  has a past medical history of Arthritis, Back pain, Cancer ([address]), Cerebral artery occlusion with cerebral infarction ([address]), Diabetes, High blood pressure, Hyperlipidemia, Seizure ([address]), SVT (supraventricular tachycardia) ([address]), and Type II or unspecified type diabetes mellitus without mention of complication, not stated as uncontrolled. who presents to the ED with alteration in mental status. Patient does have a history of stroke and ever since then she has had some difficulty finding her words but for last 3 days the patient just would not get out of bed. She has suddenly started having hallucinations and saying things that just are not cohesive. Daughter describes multiple episodes of visual hallucinations. No recent changes in her medications.     I have reviewed the following from the nursing documentation:        HISTORY :      Past Medical History:   Diagnosis Date    Arthritis     Back pain     Cancer ([address])     breast, Left Lumpectomy and radiation      Cerebral artery occlusion with cerebral infarction ([address])     Diabetes     High blood pressure     Hyperlipidemia     Seizure ([address])     10 yrs ago     SVT (supraventricular tachycardia) (HCC)     Type II or unspecified type diabetes mellitus without mention of complication, not stated as uncontrolled      Past Surgical History:   Procedure Laterality Date    BACK SURGERY      BREAST LUMPECTOMY Left     CARPAL TUNNEL RELEASE      left    COLONOSCOPY CARE  N/A    CLINICAL IMPRESSION  1. Altered mental status, unspecified altered mental status type    2. Hallucinations    3. Sphenoid sinusitis, unspecified chronicity        DISPOSITION  Decision To Admit 10/05/2023 12:18:36 AM     Baljeet Lee MD    Note: This chart was created using voice recognition dictation software. Efforts were made by me to ensure accuracy, however some errors may be present due to limitations of this technology and occasionally words are not transcribed correctly.        Baljeet Lee MD  10/05/23 Davonte Benson

## 2023-10-05 NOTE — CONSULTS
Infectious Diseases Inpatient Consult Note      Reason for Consult:  Change in mentation, Sinusitis Lactic acidosis     Requesting Physician:  Ramakrishna Del Rio     Primary Care Physician:  Kerri Segal    History Obtained From:  Epic , family     CHIEF COMPLAINT:     Chief Complaint   Patient presents with    Hallucinations     Pt came in by squad after her daughter called for pt having visual hallucinations that started 1 hour ago    Aphasia    Altered Mental Status         HISTORY OF PRESENT ILLNESS:  70 y.o. woman with a history of diabetes, atrial fibrillation, history of CVA, anticoagulation, admitted hospital secondary to change in mental status mainly hallucinations. Patient is on Seroquel Xanax and Duloxetine, per family was concerned about pharmacy is not clear if pt is taking her meds correctly. She has no fever no chills no respiratory symptoms, no headache no ear discharge no sinus symptoms Labs indicate creatinine 0.6 lactic acid 3.5 glucose 194, WBC 7.2 hemoglobin 13.9, blood culture in process. MRI of the brain no acute injury abnormality chronic lacunar infarct. There is mucosal thickening of the left sphenoid sinus with complete opacification.   Given the concern for sinusitis we are consulted for recommendations    Past Medical History:    Past Medical History:   Diagnosis Date    Arthritis     Back pain     Cancer ([address])     breast, Left Lumpectomy and radiation      Cerebral artery occlusion with cerebral infarction ([address])     Diabetes     High blood pressure     Hyperlipidemia     Seizure ([address])     10 yrs ago     SVT (supraventricular tachycardia) (HCC)     Type II or unspecified type diabetes mellitus without mention of complication, not stated as uncontrolled        Past Surgical History:    Past Surgical History:   Procedure Laterality Date    BACK SURGERY      BREAST LUMPECTOMY Left     CARPAL TUNNEL RELEASE      left    COLONOSCOPY      HIP FRACTURE SURGERY Right 3/18/2020

## 2023-10-05 NOTE — PLAN OF CARE
Problem: Discharge Planning  Goal: Discharge to home or other facility with appropriate resources  Outcome: Progressing  Flowsheets  Taken 10/5/2023 1341 by Clementina Jiménez RN  Discharge to home or other facility with appropriate resources:   Identify barriers to discharge with patient and caregiver   Identify discharge learning needs (meds, wound care, etc)   Refer to discharge planning if patient needs post-hospital services based on physician order or complex needs related to functional status, cognitive ability or social support system   Arrange for needed discharge resources and transportation as appropriate  Taken 10/5/2023 0318 by Zev Jorgensen RN  Discharge to home or other facility with appropriate resources: Identify discharge learning needs (meds, wound care, etc)     Problem: Confusion  Goal: Confusion, delirium, dementia, or psychosis is improved or at baseline  Description: INTERVENTIONS:  1. Assess for possible contributors to thought disturbance, including medications, impaired vision or hearing, underlying metabolic abnormalities, dehydration, psychiatric diagnoses, and notify attending LIP  2. [place] high risk fall precautions, as indicated  3. Provide frequent short contacts to provide reality reorientation, refocusing and direction  4. Decrease environmental stimuli, including noise as appropriate  5. Monitor and intervene to maintain adequate nutrition, hydration, elimination, sleep and activity  6. If unable to ensure safety without constant attention obtain sitter and review sitter guidelines with assigned personnel  7.  Initiate Psychosocial CNS and Spiritual Care consult, as indicated  Outcome: Progressing  Flowsheets (Taken 10/5/2023 1341)  Effect of thought disturbance (confusion, delirium, dementia, or psychosis) are managed with adequate functional status:   Assess for contributors to thought disturbance, including medications, impaired vision or hearing, underlying metabolic abnormalities, dehydration, psychiatric diagnoses, notify [address] high risk fall precautions, as indicated   Provide frequent short contacts to provide reality reorientation, refocusing and direction   Monitor and intervene to maintain adequate nutrition, hydration, elimination, sleep and activity     Problem: Skin/Tissue Integrity  Goal: Absence of new skin breakdown  Description: 1. Monitor for areas of redness and/or skin breakdown  2. Assess vascular access sites hourly  3. Every 4-6 hours minimum:  Change oxygen saturation probe site  4. Every 4-6 hours:  If on nasal continuous positive airway pressure, respiratory therapy assess nares and determine need for appliance change or resting period. Outcome: Progressing  Note: Patient skin condition and mucus membrane integrity remain unchanged during this shift. Skin breakdown prevention interventions are in place. Will continue to monitor and assess.        Problem: Safety - Adult  Goal: Free from fall injury  Outcome: Progressing  Flowsheets (Taken 10/5/2023 1341)  Free From Fall Injury: Instruct family/caregiver on patient safety     Problem: Chronic Conditions and Co-morbidities  Goal: Patient's chronic conditions and co-morbidity symptoms are monitored and maintained or improved  Outcome: Progressing  Flowsheets (Taken 10/5/2023 1341)  Care Plan - Patient's Chronic Conditions and Co-Morbidity Symptoms are Monitored and Maintained or Improved:   Monitor and assess patient's chronic conditions and comorbid symptoms for stability, deterioration, or improvement   Collaborate with multidisciplinary team to address chronic and comorbid conditions and prevent exacerbation or deterioration   Update acute care plan with appropriate goals if chronic or comorbid symptoms are exacerbated and prevent overall improvement and discharge     Problem: Pain  Goal: Verbalizes/displays adequate comfort level or baseline comfort level  Outcome: Progressing  Flowsheets

## 2023-10-05 NOTE — CONSULTS
PSYCHIATRY CONSULT, INITIAL EVALUATION      ReReferring Provider:  Peewee Farris MD    CC/Reason for Consult: hallucination     HPI:   context: This is a 70 y.o female with PMHx diabetes, arthritis, HTN, SVT, CVA with residual speech difficulties, breast cancer, anxiety, depression presented with AMS with waxing and waning over three days. Patient has been on Xanax, Seroquel, Abilify, Cymbalta. Reports patient recently started started on Abilify 2 mg/daily by PCP 2023. Patient was found to have chronic sinusitis, sphenoidal. Patient having visual  hallucination for about week. Denies any previous of such episodes. . She reports her PCP started on new diabetes medications ( Ozempic). She also started on Abilify recently. Reports has been taking Xanax for about 10 yrs for anxiety,. Reports she had fallen many times ( ~ 10 ) times in the past. Psychiatry was consulted for auditory hallucination     associated symptoms:visual hallucinations- seeing monster, and different  creatures. She sees people with funny body  parts ( long nose, very big ear). They walk on walls and ceilings. Disrupted sleep. Denies hearing voices. Reports recently her best friend  and feeling depressed. Feels anxious about her general health and daily life. Worries about financial issues ( cost to cover medical bills). Her Eliqus outpocket about $ 400.00 monthly.   modifying factors:  new medications? ( Abilify and Ozempic )   Timing: acute   duration: one week   severity: moderate     ROS: Negative for HA, blurry vision, CP, SOB, dizziness, Syncope, tremors, abd pain, or abnormal movements.      Past Psychiatric History:       Hosp: denies        Diagnoses: depression, anxiety        Med trials: Trazodone, Abilify, Seroquel, Xanax, Wellbutrin, Celexa        Outpt: denies       Suicide Attempts: denies     Substance Use History:     Nicotine: denies      Alcohol: social drinker      Illicits: denies     Past Medical History:   Past

## 2023-10-05 NOTE — H&P
Hospital Medicine History & Physical      Date of Admission: 10/4/2023    Date of Service:  Pt seen/examined on 10/05/23       []Admitted to Inpatient with expected LOS greater than two midnights due to medical therapy. [x]Placed in Observation status. Chief Admission Complaint: Hallucinations    Presenting Admission History:      70 y.o. female with history of DM2, A-fib, history of CVA with residual speech difficulties, depression, anxiety, history of suicidal ideations on Seroquel, Xanax, duloxetine who presented from home due to new hallucinations. Daughter at bedside reports that patient has had waxing and waning mentation in the past, but tonight is the first time that she has had hallucinations. Prior to that, she had being in bed for most of the day for the last 3 days and had not slept well. Patient endorses seeing people holding and dog and threatening her earlier. Then reported hearing loud rap music in the ED was disturbing to her. She is now back to her baseline mental status and is very aware of her hallucinations. However, daughter reports that earlier tonight while she was having visual hallucinations, she did not appear to understand that was not real and she was combative at the time. Aside from her hallucinations, patient only reported mild sinus drainage as of today. Patient has not had any fever, chills, change  facial pain, vision changes, change in smell. Work up showed mild hyponatremia to 130. CT head shows sphenoid sinusitis with hyperdense opacification that could not rule out fungal etiology. No evidence of bony erosion.     Assessment/Plan:      Hallucinations, paranoid, both visual and auditory, self-limited  In the setting of history of depression, anxiety, suicidal ideations currently on Seroquel Xanax and duloxetine  In the setting of likely vascular dementia  Highly favor neuropsychiatric etiology with behavioral disturbances and psychotic features  However,

## 2023-10-05 NOTE — CARE COORDINATION
Recd call from [facility] and then [facility] reporting she is active with PT and OT from [facility].  Will need orders to resume at [facility]  Electronically signed by SATURNINO Espinoza on 10/5/2023 at 2:52 PM

## 2023-10-05 NOTE — CARE COORDINATION
Case Management Assessment  Initial Evaluation    Date/Time of Evaluation: 10/5/2023 12:10 PM  Assessment Completed by: SATURNINO Garcia    If patient is discharged prior to next notation, then this note serves as note for discharge by case management. Patient Name: Wes Baker                   YOB: 1945  Diagnosis: Hallucinations [R44.3]  Sinusitis chronic, sphenoidal [J32.3]  Altered mental status, unspecified altered mental status type [R41.82]  Sphenoid sinusitis, unspecified chronicity [J32.3]                   Date / Time: 10/4/2023  9:16 PM    Patient Admission Status: Observation   Readmission Risk (Low < 19, Mod (19-27), High > 27): No data recorded  Current PCP: Ector Dakins  PCP verified by CM? (P) Yes    Chart Reviewed: Yes      History Provided by: Patient, Other (see comment) Yoshi Fallon)  Patient Orientation: Person, Place, Other (see comment) (unsure of grandchildrens names)    Patient Cognition: Other (see comment) (altererd)    Hospitalization in the last 30 days (Readmission):  No    If yes, Readmission Assessment in CM Navigator will be completed. Advance Directives:      Code Status: Full Code   Patient's Primary Decision Maker is: (P) Legal Next of Kin    Primary Decision Maker: Elizabeth Li - [phone]    Secondary Decision Maker:  Delilah Weeks [facility] [phone]    Discharge Planning:    Patient lives with: (P) Children Type of Home: (P) Other (Comment) (condo)  Primary Care Giver: Self  Patient Support Systems include: (P) Children, Family Members   Current Financial resources: (P) Medicare  Current community resources: (P) ECF/Home Care  Current services prior to admission: (P) [facility] (unsure what [facility])            Current DME:              Type of Home Care services:  (P) None    ADLS  Prior functional level: (P) Assistance with the following:, Cooking, Housework  Current functional level: (P) Assistance with the following:, were provided with a choice of provider and agrees with the discharge plan. Freedom of choice list with basic dialogue that supports the patient's individualized plan of care/goals and shares the quality data associated with the providers was provided to: (P) Patient   Patient Representative Name:       The Patient and/or Patient Representative Agree with the Discharge Plan? (P) Yes    SATURNINO Sheldon  Case Management Department  Ph: [phone] Fax: [phone]  Electronically signed by SATURNINO Sheldon on 10/5/2023 at 12:10 PM       10/05/23 1150   Service Assessment   Patient Orientation Person;Place; Other (see comment)  (unsure of grandchildrens names)   Cognition Other (see comment)  (altererd)   History Provided By Patient; Other (see comment)  Emilyblanquita Rausch)   Primary Caregiver Self   Accompanied By/Relationship grand children   Support Systems Children;Family Members   Patient's Healthcare Decision Maker is: Legal Next of [address] [place] [address]   PCP Verified by CM Yes   Last Visit to PCP Within last 3 months   Prior Functional Level Assistance with the following:;Cooking;Housework   Current Functional Level Assistance with the following:;Housework;Cooking   Can patient return to prior living arrangement No   Ability to make needs known: Good   Family able to assist with home care needs: Other (comment)  (needs rehab)   Would you like for me to discuss the discharge plan with any other family members/significant others, and if so, who? Yes  (dtr Chitra Simon)   Financial Resources [facility] Resources ECF/Home Care   CM/SW Referral Other (see comment)  (dc plans)   Discharge Planning   Type of Residence Other (Comment)  (condo)   Living Arrangements Children   Current Services Prior To Admission Home Care  (unsure what [address])   Potential Assistance Needed Other (Comment)  (ARU)   DME Ordered?  No   Potential Assistance Purchasing Medications No   Type of Home Care Services None   Patient expects to be discharged

## 2023-10-05 NOTE — ED NOTES
Report called to Amy Cortes RN to assume care. All questions and concerns answered.       Letty Rosado RN  10/05/23 3571

## 2023-10-05 NOTE — PROGRESS NOTES
Patient in bed, A&O X4 with intermittent confusion. Denies hallucinations at this time. VSS. Left PIV  flushed, dressing CDI, infusing at this time. Patient reports pain of a 3/10 I her lower back. Denies a need for pharmaceutical intervention at this time. Repositioned and supported with pillows for comfort. AM medications taken whole without complaint with small sips of water (see eMAR). Patient remains NPO. No other needs verbalized at this time. Standard safety precautions in place and call light within reach.  Electronically signed by Stephanie Liao RN on 10/5/2023 at 0835 AM

## 2023-10-05 NOTE — ED NOTES
Report received from Jeanine Taylor, ED RN to hand-off care. All questions and concerns answered.       Fatmata Roque, RN  10/04/23 0222

## 2023-10-06 PROBLEM — G93.40 ACUTE ENCEPHALOPATHY: Status: ACTIVE | Noted: 2023-10-06

## 2023-10-06 LAB
ANION GAP SERPL CALCULATED.3IONS-SCNC: 17 MMOL/L (ref 3–16)
BASOPHILS # BLD: 0 K/UL (ref 0–0.2)
BASOPHILS NFR BLD: 0.7 %
BUN SERPL-MCNC: 13 MG/DL (ref 7–20)
CALCIUM SERPL-MCNC: 9.2 MG/DL (ref 8.3–10.6)
CHLORIDE SERPL-SCNC: 101 MMOL/L (ref 99–110)
CO2 SERPL-SCNC: 21 MMOL/L (ref 21–32)
CREAT SERPL-MCNC: 1.6 MG/DL (ref 0.6–1.2)
DEPRECATED RDW RBC AUTO: 13.1 % (ref 12.4–15.4)
EOSINOPHIL # BLD: 0.3 K/UL (ref 0–0.6)
EOSINOPHIL NFR BLD: 4.3 %
FOLATE SERPL-MCNC: 9.7 NG/ML (ref 4.78–24.2)
GFR SERPLBLD CREATININE-BSD FMLA CKD-EPI: 33 ML/MIN/{1.73_M2}
GLUCOSE BLD-MCNC: 139 MG/DL (ref 70–99)
GLUCOSE BLD-MCNC: 171 MG/DL (ref 70–99)
GLUCOSE BLD-MCNC: 173 MG/DL (ref 70–99)
GLUCOSE BLD-MCNC: 205 MG/DL (ref 70–99)
GLUCOSE SERPL-MCNC: 143 MG/DL (ref 70–99)
HCT VFR BLD AUTO: 38 % (ref 36–48)
HGB BLD-MCNC: 13.1 G/DL (ref 12–16)
LYMPHOCYTES # BLD: 2 K/UL (ref 1–5.1)
LYMPHOCYTES NFR BLD: 30.4 %
MCH RBC QN AUTO: 31.5 PG (ref 26–34)
MCHC RBC AUTO-ENTMCNC: 34.6 G/DL (ref 31–36)
MCV RBC AUTO: 91 FL (ref 80–100)
MONOCYTES # BLD: 0.8 K/UL (ref 0–1.3)
MONOCYTES NFR BLD: 12.2 %
NEUTROPHILS # BLD: 3.5 K/UL (ref 1.7–7.7)
NEUTROPHILS NFR BLD: 52.4 %
PERFORMED ON: ABNORMAL
PLATELET # BLD AUTO: 242 K/UL (ref 135–450)
PMV BLD AUTO: 8.1 FL (ref 5–10.5)
POTASSIUM SERPL-SCNC: 4.1 MMOL/L (ref 3.5–5.1)
PROCALCITONIN SERPL IA-MCNC: 0.08 NG/ML (ref 0–0.15)
RBC # BLD AUTO: 4.18 M/UL (ref 4–5.2)
SODIUM SERPL-SCNC: 139 MMOL/L (ref 136–145)
TSH SERPL DL<=0.005 MIU/L-ACNC: 2.62 UIU/ML (ref 0.27–4.2)
VIT B12 SERPL-MCNC: 226 PG/ML (ref 211–911)
WBC # BLD AUTO: 6.7 K/UL (ref 4–11)

## 2023-10-06 PROCEDURE — 1200000000 HC SEMI PRIVATE

## 2023-10-06 PROCEDURE — 97530 THERAPEUTIC ACTIVITIES: CPT

## 2023-10-06 PROCEDURE — 97116 GAIT TRAINING THERAPY: CPT

## 2023-10-06 PROCEDURE — 82746 ASSAY OF FOLIC ACID SERUM: CPT

## 2023-10-06 PROCEDURE — 97535 SELF CARE MNGMENT TRAINING: CPT

## 2023-10-06 PROCEDURE — 82607 VITAMIN B-12: CPT

## 2023-10-06 PROCEDURE — 6370000000 HC RX 637 (ALT 250 FOR IP): Performed by: STUDENT IN AN ORGANIZED HEALTH CARE EDUCATION/TRAINING PROGRAM

## 2023-10-06 PROCEDURE — 97166 OT EVAL MOD COMPLEX 45 MIN: CPT

## 2023-10-06 PROCEDURE — 2580000003 HC RX 258: Performed by: STUDENT IN AN ORGANIZED HEALTH CARE EDUCATION/TRAINING PROGRAM

## 2023-10-06 PROCEDURE — G0378 HOSPITAL OBSERVATION PER HR: HCPCS

## 2023-10-06 PROCEDURE — 36415 COLL VENOUS BLD VENIPUNCTURE: CPT

## 2023-10-06 PROCEDURE — 84443 ASSAY THYROID STIM HORMONE: CPT

## 2023-10-06 PROCEDURE — 85025 COMPLETE CBC W/AUTO DIFF WBC: CPT

## 2023-10-06 PROCEDURE — 94760 N-INVAS EAR/PLS OXIMETRY 1: CPT

## 2023-10-06 PROCEDURE — 2580000003 HC RX 258: Performed by: NURSE PRACTITIONER

## 2023-10-06 PROCEDURE — 97161 PT EVAL LOW COMPLEX 20 MIN: CPT

## 2023-10-06 PROCEDURE — 80048 BASIC METABOLIC PNL TOTAL CA: CPT

## 2023-10-06 PROCEDURE — 84145 PROCALCITONIN (PCT): CPT

## 2023-10-06 RX ORDER — LANOLIN ALCOHOL/MO/W.PET/CERES
9 CREAM (GRAM) TOPICAL EVERY EVENING
Qty: 30 TABLET | Refills: 3 | Status: SHIPPED | OUTPATIENT
Start: 2023-10-06 | End: 2023-10-10 | Stop reason: SDUPTHER

## 2023-10-06 RX ORDER — 0.9 % SODIUM CHLORIDE 0.9 %
500 INTRAVENOUS SOLUTION INTRAVENOUS ONCE
Status: COMPLETED | OUTPATIENT
Start: 2023-10-06 | End: 2023-10-06

## 2023-10-06 RX ORDER — PIOGLITAZONEHYDROCHLORIDE 45 MG/1
45 TABLET ORAL DAILY
Qty: 30 TABLET | Refills: 3 | Status: SHIPPED | OUTPATIENT
Start: 2023-10-06 | End: 2023-10-08 | Stop reason: HOSPADM

## 2023-10-06 RX ORDER — ALPRAZOLAM 0.5 MG/1
0.5 TABLET ORAL 2 TIMES DAILY PRN
Qty: 20 TABLET | Refills: 0 | Status: SHIPPED | OUTPATIENT
Start: 2023-10-06 | End: 2023-10-08 | Stop reason: SDUPTHER

## 2023-10-06 RX ADMIN — PANTOPRAZOLE SODIUM 40 MG: 40 TABLET, DELAYED RELEASE ORAL at 06:19

## 2023-10-06 RX ADMIN — ATENOLOL 50 MG: 50 TABLET ORAL at 09:28

## 2023-10-06 RX ADMIN — ACETAMINOPHEN 650 MG: 325 TABLET ORAL at 13:11

## 2023-10-06 RX ADMIN — DULOXETINE HYDROCHLORIDE 60 MG: 60 CAPSULE, DELAYED RELEASE ORAL at 20:44

## 2023-10-06 RX ADMIN — QUETIAPINE FUMARATE 100 MG: 25 TABLET ORAL at 20:43

## 2023-10-06 RX ADMIN — INSULIN LISPRO 1 UNITS: 100 INJECTION, SOLUTION INTRAVENOUS; SUBCUTANEOUS at 16:49

## 2023-10-06 RX ADMIN — Medication 10 ML: at 09:29

## 2023-10-06 RX ADMIN — LISINOPRIL 20 MG: 10 TABLET ORAL at 09:29

## 2023-10-06 RX ADMIN — Medication 10 ML: at 20:35

## 2023-10-06 RX ADMIN — DULOXETINE HYDROCHLORIDE 60 MG: 60 CAPSULE, DELAYED RELEASE ORAL at 09:29

## 2023-10-06 RX ADMIN — SODIUM CHLORIDE 500 ML: 9 INJECTION, SOLUTION INTRAVENOUS at 18:21

## 2023-10-06 RX ADMIN — Medication 9 MG: at 20:44

## 2023-10-06 ASSESSMENT — PAIN SCALES - GENERAL: PAINLEVEL_OUTOF10: 2

## 2023-10-06 ASSESSMENT — PAIN DESCRIPTION - DESCRIPTORS: DESCRIPTORS: ACHING

## 2023-10-06 ASSESSMENT — PAIN DESCRIPTION - LOCATION: LOCATION: HEAD

## 2023-10-06 ASSESSMENT — PAIN - FUNCTIONAL ASSESSMENT: PAIN_FUNCTIONAL_ASSESSMENT: PREVENTS OR INTERFERES SOME ACTIVE ACTIVITIES AND ADLS

## 2023-10-06 NOTE — PROGRESS NOTES
Patient alert and oriented x4. Patient hallucinating with visual and auditory hallucinations. No complaints of pain at this time. Irritated by IV. This RN educated on why the IV is necessary and patient vocalized understanding. Call light within reach. Able to make needs known.      Electronically signed by Kristie Stubbs RN on 10/6/2023 at 1:03 AM

## 2023-10-06 NOTE — PLAN OF CARE
Problem: Confusion  Goal: Confusion, delirium, dementia, or psychosis is improved or at baseline  10/6/2023 1447 by Newton Thurston RN  Outcome: Progressing  Flowsheets (Taken 10/6/2023 3711)  Effect of thought disturbance (confusion, delirium, dementia, or psychosis) are managed with adequate functional status: Assess for contributors to thought disturbance, including medications, impaired vision or hearing, underlying metabolic abnormalities, dehydration, psychiatric diagnoses, notify LIP  10/6/2023 0054 by Heber Otero RN  Outcome: Progressing  Flowsheets (Taken 10/5/2023 2134)  Effect of thought disturbance (confusion, delirium, dementia, or psychosis) are managed with adequate functional status:   Assess for contributors to thought disturbance, including medications, impaired vision or hearing, underlying metabolic abnormalities, dehydration, psychiatric diagnoses, notify [address] high risk fall precautions, as indicated   Provide frequent short contacts to provide reality reorientation, refocusing and direction   Monitor and intervene to maintain adequate nutrition, hydration, elimination, sleep and activity     Problem: Skin/Tissue Integrity  Goal: Absence of new skin breakdown  10/6/2023 1447 by Newton Thurston RN  Outcome: Progressing  10/6/2023 0054 by Heber Otero RN  Outcome: Progressing     Problem: Safety - Adult  Goal: Free from fall injury  10/6/2023 1447 by Newton Thurston RN  Outcome: Progressing  10/6/2023 0054 by Heber Otero RN  Outcome: Progressing  Flowsheets (Taken 10/6/2023 0054)  Free From Fall Injury: Instruct family/caregiver on patient safety

## 2023-10-06 NOTE — PROGRESS NOTES
Occupational Therapy  Facility/Department: [address] ORTHOPEDICS  Occupational Therapy Initial Assessment    Name: Adriana Alvarez  :   MRN: [id number]  Date of Service: 10/6/2023    Discharge Recommendations:  5-7 sessions per week  OT Equipment Recommendations  Other: defer to next level of care, appears to have needed bathroom DME already and owns a 4WW for community mobility. Adriana Alvarez scored a 19/24 [facility] the [facility] form. Based on the patient's their current ADL deficits, it is recommended that the patient have 5-7 sessions per week of Occupational Therapy at d/c to increase the patient's independence. At this time, this patient demonstrates complex nursing, medical, and rehabilitative needs, and would benefit from intensive rehabilitation services upon discharge from the Inpatient setting. This patient demonstrates the ability to participate in and benefit from an intensive therapy program with a coordinated interdisciplinary team approach to foster frequent, structured, and documented communication among disciplines, who will work together to establish, prioritize, and achieve treatment goals. Please see assessment section for further patient specific details. If patient discharges prior to next session this note will serve as a discharge summary. Please see below for the latest assessment towards goals. Patient Diagnosis(es): The primary encounter diagnosis was Altered mental status, unspecified altered mental status type. Diagnoses of Hallucinations, Sphenoid sinusitis, unspecified chronicity, and H/O: CVA (cerebrovascular accident) were also pertinent to this visit.   Past Medical History:  has a past medical history of Arthritis, Back pain, Cancer ([address]), Cerebral artery occlusion with cerebral infarction ([address]), Diabetes, High blood pressure, Hyperlipidemia, Seizure ([address]), SVT (supraventricular tachycardia) ([address]), and Type II or unspecified type diabetes mellitus without mention of complication, not stated as uncontrolled. Past Surgical History:  has a past surgical history that includes back surgery; Carpal tunnel release; Hysterectomy; Tonsillectomy; lymph node biopsy (Left); Colonoscopy; Intracapsular cataract extraction (Right, 3/11/2019); Breast lumpectomy (Left); Intracapsular cataract extraction (Left, 3/25/2019); and Hip fracture surgery (Right, 3/18/2020). Treatment Diagnosis: Impaired: ADL/IADL function, functional mobility/transfers, balance, activity tolerance, question safety awareness/ higher level cognition      Assessment   Performance deficits / Impairments: Decreased functional mobility ; Decreased cognition;Decreased high-level IADLs;Decreased ADL status; Decreased endurance;Decreased balance;Decreased safe awareness  Assessment: Pt is a 70 y.o. admitted with sinusitis chronic, sphenoidal, confusion, hallucinations, and impaired mobility. Pt lives in [facility] w/ dtr (who works full time) prior to admit, she was functioning independent for all ADL, simple IADL, and mobility. Pt's dtr assists w/ more difficult IADL tasks. Pt used 4WW for community mobility but no device in her home. Today, pt amb w/ no AD w/ CGA w R side sway in room and hallway. Pt was able to transfer w/o device >< toilet w/ left rail and bed CGA/SBA. Pt was able to cross LE to don and doff her socks w/ supervision. Anticipate pt may require up to CGA/ min A for full ADL tasks. Pt reports she is variable in her function. Pt is functioning below her baseline and will benefit from cont'd OT tx 5-7 times per week. Feel that pt is currently a significant fall risk d/t R side sway for mobility and she reports she feels off. She has a hx of multiple falls, including recently. Plan to follow pt while on acute care.   Treatment Diagnosis: Impaired: ADL/IADL function, functional mobility/transfers, balance, activity tolerance, question safety awareness/ higher level cognition  Prognosis:

## 2023-10-06 NOTE — PROGRESS NOTES
decreased R LE stance stability and consistent loss of balance to the R. Patient would benefit from and tolerate high intensity therapy to address deficits in high level balance and functional stability. Will progress to tolerance and monitor  Treatment Diagnosis: Impaired functional mobility  Therapy Prognosis: Good  Decision Making: Low Complexity  History: as noted  Clinical Presentation: Stable  Requires PT Follow-Up: Yes  Activity Tolerance  Activity Tolerance: Patient tolerated evaluation without incident     Plan   Physcial Therapy Plan  General Plan: 3-5 times per week  Current Treatment Recommendations: Functional mobility training, Strengthening, Transfer training, Gait training, Safety education & training, Patient/Caregiver education & training, Equipment evaluation, education, & procurement  Safety Devices  Type of Devices:  (ot present in room as this writer exits.)     Restrictions  Restrictions/Precautions  Restrictions/Precautions: Fall Risk  Position Activity Restriction  Other position/activity restrictions: Orthostatics 10-6-23: supine: 152/78, 97% HR 66;  seated: 114/73 98% HR 93;  Standin/63 94% HR76 RN informed. Subjective   General  Chart Reviewed: Yes  Patient assessed for rehabilitation services?: Yes  Additional Pertinent Hx: Per H and P:  \"74 y.o. female with history of DM2, A-fib, history of CVA with residual speech difficulties, depression, anxiety, history of suicidal ideations on Seroquel, Xanax, duloxetine who presented from home due to new hallucinations. Daughter at bedside reports that patient has had waxing and waning mentation in the past, but tonight is the first time that she has had hallucinations. Prior to that, she had being in bed for most of the day for the last 3 days. [employer] Work up showed mild hyponatremia to 130. CT head shows sphenoid sinusitis with hyperdense opacification that could not rule out fungal etiology.   No evidence of bony erosion\"  Other PMHx: Fall, with R femur fracture with gamma nailing March 2018, Back sx x 2. RFA back January 2023. Response To Previous Treatment: Not applicable  Family / Caregiver Present: Yes (son)  Referring Practitioner: Zenobia Hernandez MD  Referral Date : 10/06/23  Subjective  Subjective: Patient in [facility] chair. Agreeable to therapy. Denies pain, although with frequent back pain in context of lowback sx x 2. Patient reports that ~ 2-3 x/week she slides herself off edge of bed to floor and crawls to the bathroom due to high lowback pain. Social/Functional History  Social/Functional History  Lives With: Daughter (dtr works full time, teacher)  Type of Home: Condo  Home Layout: One level  Home Access: Stairs to enter without rails  Entrance Stairs - Number of Steps: 1  Bathroom Shower/Tub: Walk-in shower, Doors  Bathroom Toilet: Standard (with riser. Pt has wall w/ partition next to toilet for support.)  Bathroom Equipment: Grab bars in shower, Built-in shower seat  Home Equipment: Walker, rolling, Walker, 4 wheeled, [place], quad, Alert Button  Has the patient had two or more falls in the past year or any fall with injury in the past year?: Yes (Pt reports that she has had 3-4 falls in the past year. Pt's son reports it has been much more than that.)  ADL Assistance: Independent  Homemaking Assistance: Independent (dtr for heavy cleaning.)  Ambulation Assistance: Independent (Slides to floor from EOB and crawls to bathroom, usually in AM, 2-3 x per week. This is due to bad back.)  Transfer Assistance: Independent  Active : Yes  Additional Comments: sleeps in head-adjustable bed.     Vision/Hearing  Vision  Vision: Impaired  Vision Exceptions: Wears glasses for reading  Hearing  Hearing: Exceptions to [employer]  Hearing Exceptions: Bilateral hearing aid (doesn't always wear.)      Cognition   Orientation  Overall Orientation Status: Within Normal Limits  Orientation Level: Oriented X4  Cognition  Cognition Comment: Care  Education Provided Comments: consideration of obtaining Life alert system; consideration of patient engagement in regular socialization.   Education Method: Demonstration;Verbal  Education Outcome: Verbalized understanding      Therapy Time   Individual Concurrent Group Co-treatment   Time In 1600         Time Out 1700         Minutes 61            Ev 15; TA 27 Gt [address] Mauricia Burger, PT  Electronically signed by Susanna Stephen, [address] [place] [address] [phone])  on 10/6/2023 at 5:42 PM

## 2023-10-06 NOTE — PLAN OF CARE
Problem: Discharge Planning  Goal: Discharge to home or other facility with appropriate resources  10/6/2023 0054 by Petra Monaco RN  Outcome: Progressing  Flowsheets (Taken 10/5/2023 2134)  Discharge to home or other facility with appropriate resources:   Identify barriers to discharge with patient and caregiver   Arrange for needed discharge resources and transportation as appropriate   Identify discharge learning needs (meds, wound care, etc)  10/5/2023 1341 by Luis Hutchinson RN  Outcome: Progressing  Flowsheets  Taken 10/5/2023 1341 by Luis Hutchinson RN  Discharge to home or other facility with appropriate resources:   Identify barriers to discharge with patient and caregiver   Identify discharge learning needs (meds, wound care, etc)   Refer to discharge planning if patient needs post-hospital services based on physician order or complex needs related to functional status, cognitive ability or social support system   Arrange for needed discharge resources and transportation as appropriate  Taken 10/5/2023 0318 by Lisa Man RN  Discharge to home or other facility with appropriate resources: Identify discharge learning needs (meds, wound care, etc)     Problem: Confusion  Goal: Confusion, delirium, dementia, or psychosis is improved or at baseline  Description: INTERVENTIONS:  1. Assess for possible contributors to thought disturbance, including medications, impaired vision or hearing, underlying metabolic abnormalities, dehydration, psychiatric diagnoses, and notify attending LIP  2. [place] high risk fall precautions, as indicated  3. Provide frequent short contacts to provide reality reorientation, refocusing and direction  4. Decrease environmental stimuli, including noise as appropriate  5. Monitor and intervene to maintain adequate nutrition, hydration, elimination, sleep and activity  6.  If unable to ensure safety without constant attention obtain sitter and review sitter guidelines with assigned personnel  7. Initiate Psychosocial CNS and Spiritual Care consult, as indicated  10/6/2023 0054 by Laurence Cr RN  Outcome: Progressing  Flowsheets (Taken 10/5/2023 2134)  Effect of thought disturbance (confusion, delirium, dementia, or psychosis) are managed with adequate functional status:   Assess for contributors to thought disturbance, including medications, impaired vision or hearing, underlying metabolic abnormalities, dehydration, psychiatric diagnoses, notify [facility] high risk fall precautions, as indicated   Provide frequent short contacts to provide reality reorientation, refocusing and direction   Monitor and intervene to maintain adequate nutrition, hydration, elimination, sleep and activity  10/5/2023 1341 by Klaus Dejesus RN  Outcome: Progressing  Flowsheets (Taken 10/5/2023 1341)  Effect of thought disturbance (confusion, delirium, dementia, or psychosis) are managed with adequate functional status:   Assess for contributors to thought disturbance, including medications, impaired vision or hearing, underlying metabolic abnormalities, dehydration, psychiatric diagnoses, notify [facility] high risk fall precautions, as indicated   Provide frequent short contacts to provide reality reorientation, refocusing and direction   Monitor and intervene to maintain adequate nutrition, hydration, elimination, sleep and activity     Problem: Skin/Tissue Integrity  Goal: Absence of new skin breakdown  Description: 1. Monitor for areas of redness and/or skin breakdown  2. Assess vascular access sites hourly  3. Every 4-6 hours minimum:  Change oxygen saturation probe site  4. Every 4-6 hours:  If on nasal continuous positive airway pressure, respiratory therapy assess nares and determine need for appliance change or resting period.   10/6/2023 0054 by Laurence Cr RN  Outcome: Progressing  10/5/2023 1341 by Klaus Dejesus RN  Outcome: Progressing  Note: Patient skin condition

## 2023-10-06 NOTE — PROGRESS NOTES
Discharge is held sec to weakness; PTOT consult pending; might need placement to rehab per evaluation and recommendations    EDDA Mclean CNP  10/6/2023w  3:52 PM

## 2023-10-06 NOTE — DISCHARGE SUMMARY
V2.0  Discharge Summary    Name:  Delmy Botello /Age/Sex:  (89 y.o. female)   Admit Date: 10/4/2023  Discharge Date: 10/6/23    MRN & CSN:  [id number] & [id number] Encounter Date and Time 10/6/23 2:58 PM EDT    Attending:  Sparkle Mullen MD Discharging Provider: EDDA Bello - [facility]       Hospital Course:     Brief HPI: Delmy Botello is a 70 y.o. female who presented with pmh  of DM2, A-fib, history of CVA with residual speech difficulties, depression, anxiety, history of suicidal ideations on Seroquel, Xanax, duloxetine who presented from home due to new hallucinations and was found to have Sinusitis chronic, sphenoidal.  Patient daughter at bedside report waxing and waning patient over 3 days prior, patient endorses visual and auditory hallucinations. Concern for infectious vs neuropsych etiology. Work-up in the ED showed mild hyponatremia, head CT showed sphenoid sinusitis with hyperdense opacification that could not rule out fungal etiology. No evidence of bony erosion. ID and ENT were consulted. Started on empiric Augmentin and amphotericin pending ID recommendations. CT sinus and MRI brain ordered by ENT. bMRI showed mucosal thickening of the left sphenoid sinus with near complete opacification with central T2 hypointensity. Findings may be related to   inspissated secretions versus allergic fungal sinusitis. UDS was positive for benzodiazepines which patient is prescribed. UA was negative for infection. Patient on multiple psychiatric medications, most recently placed on Abilify prior to hallucinations. Psych was consulted for recommendations. Brief Problem Based Course:   #Visual and auditory hallucinations  #Anxiety, depression  -?infectious etiology vs vascular dementia vs polypharmacy  -bMRI as below, UA negative  -UDS positive for bezos (prescribed) patient was also seen by ID and ENT and medications including antibiotics were discontinued.   -Psych consulted, was performed without and with the administration of intravenous contrast. COMPARISON: 04/13/2023. HISTORY: ORDERING SYSTEM PROVIDED HISTORY: r/o invasive fungal sinusitis TECHNOLOGIST PROVIDED HISTORY: Reason for exam:->r/o invasive fungal sinusitis Reason for Exam: r/o invasive fungal sinusitis Initial evaluation. FINDINGS: INTRACRANIAL STRUCTURES/VENTRICLES:  There is no acute infarct. Chronic lacunar infarct in the left thalamus. No mass effect or midline shift. No evidence of an acute intracranial hemorrhage. Areas of T2 FLAIR hyperintensity are seen in the periventricular and subcortical white matter, which are nonspecific, but may represent chronic microvascular ischemic change. There is mild global parenchymal volume loss. Otherwise, the ventricles and sulci are normal in size and configuration. The sellar/suprasellar regions appear unremarkable. The normal signal voids within the major intracranial vessels appear maintained. No abnormal focus of enhancement is seen within the brain. ORBITS: The visualized portion of the orbits demonstrate no acute abnormality. SINUSES: Mucosal thickening of the left sphenoid sinus with central decreased T2 signal.  There is a trace left mastoid effusion. BONES/SOFT TISSUES: The bone marrow signal intensity appears normal. The soft tissues demonstrate no acute abnormality. 1. No acute intracranial abnormality. No acute infarct. 2. Mild global parenchymal volume loss with chronic microvascular ischemic changes. 3. Chronic lacunar infarct within the left thalamus. 4. There is mucosal thickening of the left sphenoid sinus with near complete opacification with central T2 hypointensity. Findings may be related to inspissated secretions versus allergic fungal sinusitis.   This is slightly increased from the prior exam. 5. Trace left mastoid effusion     CT SINUS FOR IMAGE GUIDANCE    Result Date: 10/5/2023  EXAMINATION: CT OF THE SINUS WITHOUT CONTRAST 10/5/2023 9:38

## 2023-10-06 NOTE — PROGRESS NOTES
[employer] message sent to Urmila Reyna NP to notify of patients positive orthostatic B/Ps during therapy evaluation. Orthostatic VS documented in progress note and new order entered for Fluid bolus.

## 2023-10-06 NOTE — PROGRESS NOTES
XYZE message sent to Dr. Teresita Greenwood to request orders for PT and OT as family is considering a SNF at discharge.  Orders entered

## 2023-10-06 NOTE — CARE COORDINATION
Noted dc order, met with pt and son at bedside. They are confused on the dc order and they thought we were working to get her to [facility] ARU. We are working on this and [facility] is following waiting on therapy evals which was just ordered. Therapy will see pt today. Pt and family very much want pt to go to ARU at dc and they have been working on getting her there from home but then pt had to come to hospital.  Spoke with NP and she will come see pt and son.   Electronically signed by SATURNINO Farrell on 10/6/2023 at 3:38 PM

## 2023-10-07 LAB
ALBUMIN SERPL-MCNC: 4.1 G/DL (ref 3.4–5)
ALP SERPL-CCNC: 56 U/L (ref 40–129)
ALT SERPL-CCNC: 40 U/L (ref 10–40)
ANION GAP SERPL CALCULATED.3IONS-SCNC: 11 MMOL/L (ref 3–16)
AST SERPL-CCNC: 31 U/L (ref 15–37)
BILIRUB DIRECT SERPL-MCNC: <0.2 MG/DL (ref 0–0.3)
BILIRUB INDIRECT SERPL-MCNC: NORMAL MG/DL (ref 0–1)
BILIRUB SERPL-MCNC: 0.3 MG/DL (ref 0–1)
BUN SERPL-MCNC: 15 MG/DL (ref 7–20)
CALCIUM SERPL-MCNC: 9.5 MG/DL (ref 8.3–10.6)
CHLORIDE SERPL-SCNC: 98 MMOL/L (ref 99–110)
CO2 SERPL-SCNC: 25 MMOL/L (ref 21–32)
CREAT SERPL-MCNC: 0.9 MG/DL (ref 0.6–1.2)
GFR SERPLBLD CREATININE-BSD FMLA CKD-EPI: >60 ML/MIN/{1.73_M2}
GLUCOSE BLD-MCNC: 144 MG/DL (ref 70–99)
GLUCOSE BLD-MCNC: 145 MG/DL (ref 70–99)
GLUCOSE BLD-MCNC: 167 MG/DL (ref 70–99)
GLUCOSE BLD-MCNC: 173 MG/DL (ref 70–99)
GLUCOSE SERPL-MCNC: 143 MG/DL (ref 70–99)
PERFORMED ON: ABNORMAL
POTASSIUM SERPL-SCNC: 4 MMOL/L (ref 3.5–5.1)
PROT SERPL-MCNC: 6.5 G/DL (ref 6.4–8.2)
SODIUM SERPL-SCNC: 134 MMOL/L (ref 136–145)

## 2023-10-07 PROCEDURE — 80076 HEPATIC FUNCTION PANEL: CPT

## 2023-10-07 PROCEDURE — 6370000000 HC RX 637 (ALT 250 FOR IP): Performed by: REGISTERED NURSE

## 2023-10-07 PROCEDURE — 2580000003 HC RX 258: Performed by: STUDENT IN AN ORGANIZED HEALTH CARE EDUCATION/TRAINING PROGRAM

## 2023-10-07 PROCEDURE — 94760 N-INVAS EAR/PLS OXIMETRY 1: CPT

## 2023-10-07 PROCEDURE — 80048 BASIC METABOLIC PNL TOTAL CA: CPT

## 2023-10-07 PROCEDURE — 36415 COLL VENOUS BLD VENIPUNCTURE: CPT

## 2023-10-07 PROCEDURE — 1200000000 HC SEMI PRIVATE

## 2023-10-07 PROCEDURE — 6370000000 HC RX 637 (ALT 250 FOR IP): Performed by: STUDENT IN AN ORGANIZED HEALTH CARE EDUCATION/TRAINING PROGRAM

## 2023-10-07 RX ADMIN — PANTOPRAZOLE SODIUM 40 MG: 40 TABLET, DELAYED RELEASE ORAL at 06:08

## 2023-10-07 RX ADMIN — Medication 10 ML: at 08:26

## 2023-10-07 RX ADMIN — Medication 9 MG: at 21:19

## 2023-10-07 RX ADMIN — DULOXETINE HYDROCHLORIDE 60 MG: 60 CAPSULE, DELAYED RELEASE ORAL at 21:19

## 2023-10-07 RX ADMIN — APIXABAN 5 MG: 5 TABLET, FILM COATED ORAL at 21:19

## 2023-10-07 RX ADMIN — LISINOPRIL 20 MG: 10 TABLET ORAL at 08:25

## 2023-10-07 RX ADMIN — DULOXETINE HYDROCHLORIDE 60 MG: 60 CAPSULE, DELAYED RELEASE ORAL at 08:25

## 2023-10-07 RX ADMIN — ATENOLOL 50 MG: 50 TABLET ORAL at 08:25

## 2023-10-07 RX ADMIN — QUETIAPINE FUMARATE 100 MG: 25 TABLET ORAL at 21:19

## 2023-10-07 RX ADMIN — ALPRAZOLAM 0.5 MG: 0.5 TABLET ORAL at 15:13

## 2023-10-07 NOTE — PROGRESS NOTES
Patient up in chair and AAOX4. Patient denies any pain at the moment. Head to toe assessment complete. Morning medications administered with no complications. Patient denies any hallucinations at the moment. Patient not showing any signs of confusion. Fall precautions in place.   Electronically signed by Дмитрий Phelps RN on 10/7/2023 at 11:06 AM

## 2023-10-07 NOTE — PLAN OF CARE
Problem: Discharge Planning  Goal: Discharge to home or other facility with appropriate resources  Outcome: Progressing  Flowsheets (Taken 10/6/2023 2230 by Ansley Lima RN)  Discharge to home or other facility with appropriate resources: Identify barriers to discharge with patient and caregiver     Problem: Confusion  Goal: Confusion, delirium, dementia, or psychosis is improved or at baseline  Description: INTERVENTIONS:  1. Assess for possible contributors to thought disturbance, including medications, impaired vision or hearing, underlying metabolic abnormalities, dehydration, psychiatric diagnoses, and notify attending LIP  2. [place] high risk fall precautions, as indicated  3. Provide frequent short contacts to provide reality reorientation, refocusing and direction  4. Decrease environmental stimuli, including noise as appropriate  5. Monitor and intervene to maintain adequate nutrition, hydration, elimination, sleep and activity  6. If unable to ensure safety without constant attention obtain sitter and review sitter guidelines with assigned personnel  7. Initiate Psychosocial CNS and Spiritual Care consult, as indicated  Outcome: Progressing  Flowsheets (Taken 10/6/2023 0905 by Isaiah Bernardo RN)  Effect of thought disturbance (confusion, delirium, dementia, or psychosis) are managed with adequate functional status: Assess for contributors to thought disturbance, including medications, impaired vision or hearing, underlying metabolic abnormalities, dehydration, psychiatric diagnoses, notify LIP     Problem: Skin/Tissue Integrity  Goal: Absence of new skin breakdown  Description: 1. Monitor for areas of redness and/or skin breakdown  2. Assess vascular access sites hourly  3. Every 4-6 hours minimum:  Change oxygen saturation probe site  4.   Every 4-6 hours:  If on nasal continuous positive airway pressure, respiratory therapy assess nares and determine need for appliance change or resting period.   Outcome: Progressing     Problem: Safety - Adult  Goal: Free from fall injury  Outcome: Progressing  Flowsheets (Taken 10/6/2023 0905 by Isaiah Bernardo, RN)  Free From Fall Injury: Instruct family/caregiver on patient safety     Problem: Chronic Conditions and Co-morbidities  Goal: Patient's chronic conditions and co-morbidity symptoms are monitored and maintained or improved  Outcome: Progressing  Flowsheets (Taken 10/6/2023 2230 by Ansley Lima RN)  Care Plan - Patient's Chronic Conditions and Co-Morbidity Symptoms are Monitored and Maintained or Improved: Monitor and assess patient's chronic conditions and comorbid symptoms for stability, deterioration, or improvement     Problem: Pain  Goal: Verbalizes/displays adequate comfort level or baseline comfort level  Outcome: Progressing  Flowsheets (Taken 10/6/2023 0054 by Delfina Bolanos RN)  Verbalizes/displays adequate comfort level or baseline comfort level:   Encourage patient to monitor pain and request assistance   Assess pain using appropriate pain scale   Administer analgesics based on type and severity of pain and evaluate response   Implement no.jerzy  Electronically signed by Sola Fitzpatrick RN on 10/7/2023 at 8:59 AM

## 2023-10-07 NOTE — PROGRESS NOTES
Hospitalist Progress Note      PCP: Bryant Mcintyre    Date of Admission: 10/4/2023    Chief Complaint: Hallucinations    Hospital Course: 72-year-old female with past medical history of CVA, A-fib, type 2 diabetes, depression, anxiety, suicidal ideations presented to hospital with new hallucinations. She had recently been started on Ozempic and Abilify. Psychiatry was consulted and both were discontinued. Symptoms of hallucinations continue to significantly improve once these medications were stopped. There is also concern for sinusitis and ENT was consulted. Did not recommend any antibiotics, follow-up outpatient for possible biopsy of tissue. Eliquis was being held as there was thought that ENT may do procedure while in hospital, however this will not be done therefore Eliquis could be resumed. X-rays disease was consulted, who recommended to stop antibiotics as well. Patient was ready for discharge 10/6/2023, however was very weak and needs [facility] go [facility]. Therefore discharge was held. Subjective: Patient sitting up in chair, states she feels much better. No further hallucinations. Alert and oriented to person place and time events. Daughter at bedside. Reviewed plan of care with patient and the daughter. Reviewed specifically psychiatry's notes. Also discussed diabetes management. Patient has been on metformin outpatient and it caused diarrhea. But has not tried metformin ER, she is agreeable to try this at discharge. She believes her A1c was controlled well with the metformin. Denies chest pain shortness of breath palpitation abdominal pain headache lightheadedness or dizziness. Reviewed plan of care, Micronase questions. Assessment/Plan:    Hallucinations  -Visual and auditory  -Resolved now  -Most likely secondary to Abilify and Ozempic, medications were discontinued and patient significantly improved  -Psychiatry consulted, discontinue the Abilify and Ozempic. * 139   K 3.6 4.1   CL 95* 101   CO2 24 21   BUN 10 13   CREATININE 0.6 1.6*   CALCIUM 9.8 9.2     Recent Labs     10/04/23  2154   AST 37   ALT 45*   BILITOT 0.3   ALKPHOS 54     No results for input(s): \"INR\" in the last 72 hours. No results for input(s): \"CKTOTAL\", \"TROPONINI\" in the last 72 hours. Urinalysis:      Lab Results   Component Value Date/Time    NITRU Negative 10/04/2023 09:54 PM    WBCUA 2 10/04/2023 09:54 PM    BACTERIA None Seen 10/04/2023 09:54 PM    RBCUA 0 10/04/2023 09:54 PM    BLOODU Negative 10/04/2023 09:54 PM    SPECGRAV 1.003 10/04/2023 09:54 PM    GLUCOSEU Negative 10/04/2023 09:54 PM       Radiology:  MRI BRAIN W WO CONTRAST   Final Result   1. No acute intracranial abnormality. No acute infarct. 2. Mild global parenchymal volume loss with chronic microvascular ischemic   changes. 3. Chronic lacunar infarct within the left thalamus. 4. There is mucosal thickening of the left sphenoid sinus with near complete   opacification with central T2 hypointensity. Findings may be related to   inspissated secretions versus allergic fungal sinusitis. This is slightly   increased from the prior exam.   5. Trace left mastoid effusion         CT SINUS FOR IMAGE GUIDANCE   Final Result   There is no significant change in the opacification of the sphenoid sinus. Polypoid mucosal thickening seen within the maxillary sinuses bilaterally. CT Head W/O Contrast   Final Result   Mucosal thickening and hyperdense opacification of the sphenoid sinus,   consistent with sinusitis to include a fungal etiology. No evidence of bony   changes to the sphenoid sinus wall. XR CHEST PORTABLE   Final Result   No acute airspace disease identified. DVT Prophylaxis: Eliquis  Diet: ADULT DIET;  Regular  Code Status: Full Code    PT/OT Eval Status: Recommend ARU and [facility]    Dispo -discharge to [facility], likely will be medically stable tomorrow    Donnie Marie

## 2023-10-08 LAB
ANION GAP SERPL CALCULATED.3IONS-SCNC: 10 MMOL/L (ref 3–16)
BASOPHILS # BLD: 0 K/UL (ref 0–0.2)
BASOPHILS NFR BLD: 0.6 %
BUN SERPL-MCNC: 15 MG/DL (ref 7–20)
CALCIUM SERPL-MCNC: 9.5 MG/DL (ref 8.3–10.6)
CHLORIDE SERPL-SCNC: 100 MMOL/L (ref 99–110)
CO2 SERPL-SCNC: 28 MMOL/L (ref 21–32)
CREAT SERPL-MCNC: 0.9 MG/DL (ref 0.6–1.2)
DEPRECATED RDW RBC AUTO: 12.9 % (ref 12.4–15.4)
EOSINOPHIL # BLD: 0.2 K/UL (ref 0–0.6)
EOSINOPHIL NFR BLD: 4.3 %
GFR SERPLBLD CREATININE-BSD FMLA CKD-EPI: >60 ML/MIN/{1.73_M2}
GLUCOSE BLD-MCNC: 156 MG/DL (ref 70–99)
GLUCOSE BLD-MCNC: 160 MG/DL (ref 70–99)
GLUCOSE BLD-MCNC: 174 MG/DL (ref 70–99)
GLUCOSE BLD-MCNC: 223 MG/DL (ref 70–99)
GLUCOSE SERPL-MCNC: 171 MG/DL (ref 70–99)
HCT VFR BLD AUTO: 37.3 % (ref 36–48)
HGB BLD-MCNC: 13 G/DL (ref 12–16)
LYMPHOCYTES # BLD: 1.5 K/UL (ref 1–5.1)
LYMPHOCYTES NFR BLD: 26.5 %
MCH RBC QN AUTO: 31.2 PG (ref 26–34)
MCHC RBC AUTO-ENTMCNC: 34.7 G/DL (ref 31–36)
MCV RBC AUTO: 90.1 FL (ref 80–100)
MONOCYTES # BLD: 0.6 K/UL (ref 0–1.3)
MONOCYTES NFR BLD: 10.8 %
NEUTROPHILS # BLD: 3.3 K/UL (ref 1.7–7.7)
NEUTROPHILS NFR BLD: 57.8 %
PERFORMED ON: ABNORMAL
PLATELET # BLD AUTO: 236 K/UL (ref 135–450)
PMV BLD AUTO: 8.2 FL (ref 5–10.5)
POTASSIUM SERPL-SCNC: 4.2 MMOL/L (ref 3.5–5.1)
RBC # BLD AUTO: 4.15 M/UL (ref 4–5.2)
SODIUM SERPL-SCNC: 138 MMOL/L (ref 136–145)
WBC # BLD AUTO: 5.7 K/UL (ref 4–11)

## 2023-10-08 PROCEDURE — 36415 COLL VENOUS BLD VENIPUNCTURE: CPT

## 2023-10-08 PROCEDURE — 80048 BASIC METABOLIC PNL TOTAL CA: CPT

## 2023-10-08 PROCEDURE — 6370000000 HC RX 637 (ALT 250 FOR IP): Performed by: REGISTERED NURSE

## 2023-10-08 PROCEDURE — 1200000000 HC SEMI PRIVATE

## 2023-10-08 PROCEDURE — 6370000000 HC RX 637 (ALT 250 FOR IP): Performed by: STUDENT IN AN ORGANIZED HEALTH CARE EDUCATION/TRAINING PROGRAM

## 2023-10-08 PROCEDURE — 2580000003 HC RX 258: Performed by: STUDENT IN AN ORGANIZED HEALTH CARE EDUCATION/TRAINING PROGRAM

## 2023-10-08 PROCEDURE — 94760 N-INVAS EAR/PLS OXIMETRY 1: CPT

## 2023-10-08 PROCEDURE — 85025 COMPLETE CBC W/AUTO DIFF WBC: CPT

## 2023-10-08 RX ORDER — ALPRAZOLAM 0.5 MG/1
0.5 TABLET ORAL 2 TIMES DAILY PRN
Qty: 20 TABLET | Refills: 0 | Status: SHIPPED | OUTPATIENT
Start: 2023-10-08 | End: 2023-10-18

## 2023-10-08 RX ORDER — HYDROCODONE BITARTRATE AND ACETAMINOPHEN 7.5; 325 MG/1; MG/1
1 TABLET ORAL EVERY 8 HOURS PRN
Qty: 9 TABLET | Refills: 0 | Status: SHIPPED | OUTPATIENT
Start: 2023-10-08 | End: 2023-10-11

## 2023-10-08 RX ORDER — FUROSEMIDE 40 MG/1
20 TABLET ORAL DAILY PRN
Qty: 30 TABLET | Refills: 3
Start: 2023-10-08 | End: 2023-10-10 | Stop reason: SDUPTHER

## 2023-10-08 RX ORDER — METFORMIN HYDROCHLORIDE 500 MG/1
1000 TABLET, EXTENDED RELEASE ORAL
Qty: 60 TABLET | Refills: 5
Start: 2023-10-08 | End: 2023-10-10 | Stop reason: SDUPTHER

## 2023-10-08 RX ADMIN — APIXABAN 5 MG: 5 TABLET, FILM COATED ORAL at 20:25

## 2023-10-08 RX ADMIN — LISINOPRIL 20 MG: 10 TABLET ORAL at 08:41

## 2023-10-08 RX ADMIN — DULOXETINE HYDROCHLORIDE 60 MG: 60 CAPSULE, DELAYED RELEASE ORAL at 08:41

## 2023-10-08 RX ADMIN — DULOXETINE HYDROCHLORIDE 60 MG: 60 CAPSULE, DELAYED RELEASE ORAL at 20:25

## 2023-10-08 RX ADMIN — ATENOLOL 50 MG: 50 TABLET ORAL at 08:41

## 2023-10-08 RX ADMIN — Medication 10 ML: at 20:26

## 2023-10-08 RX ADMIN — PANTOPRAZOLE SODIUM 40 MG: 40 TABLET, DELAYED RELEASE ORAL at 05:48

## 2023-10-08 RX ADMIN — Medication 10 ML: at 08:41

## 2023-10-08 RX ADMIN — ALPRAZOLAM 0.5 MG: 0.5 TABLET ORAL at 14:20

## 2023-10-08 RX ADMIN — Medication 9 MG: at 18:40

## 2023-10-08 RX ADMIN — QUETIAPINE FUMARATE 100 MG: 25 TABLET ORAL at 18:39

## 2023-10-08 RX ADMIN — APIXABAN 5 MG: 5 TABLET, FILM COATED ORAL at 08:41

## 2023-10-08 ASSESSMENT — PAIN SCALES - GENERAL
PAINLEVEL_OUTOF10: 0
PAINLEVEL_OUTOF10: 0

## 2023-10-08 NOTE — CARE COORDINATION
1038:  Spoke with Gary. Precert is still pending. Electronically signed by LYNNE Taylor on 10/8/2023 at 10:38 AM            1029:  Discharge order noted. Chart reviewed. Plan is to discharge to [facility] ARU. Call to Umm[facility] [phone]. Left VM. Call back pending.     Electronically signed by Suzette Aquino RN MSN on 10/8/2023 at 10:29 AM

## 2023-10-08 NOTE — DISCHARGE INSTR - COC
Continuity of Care Form    Patient Name: Иван Gold   :  [birth year]  MRN:  [id number]    Admit date:  10/4/2023  Discharge date:  10/10/23    Code Status Order: Full Code   Advance Directives:     Admitting Physician:  Carlene Schwartz MD  PCP: Froylan Rolle    Discharging Nurse: [facility] Unit/Room#: I6Q-7055/3518-94  Discharging Unit Phone Number: [phone]    Emergency Contact:   Extended Emergency Contact Information  Primary Emergency Contact: Antonio Guerra  Address: [place] [address] [address] [place] [address] of [address] [address] [place] Phone: [phone]  Relation: Child  Secondary Emergency Contact: Latashazhao Juan, [address] [facility] of [address] [address] Phone: [phone]  Mobile Phone: [phone]  Relation: Child    Past Surgical History:  Past Surgical History:   Procedure Laterality Date    BACK SURGERY      BREAST LUMPECTOMY Left     CARPAL TUNNEL RELEASE      left    COLONOSCOPY      HIP FRACTURE SURGERY Right 3/18/2020    RIGHT HIP GAMMA NAIL performed by Gigi Dong MD at [address] ([facility])      INTRACAPSULAR CATARACT EXTRACTION Right 3/11/2019    PHACOEMULSIFICATION WITH INTRAOCULAR LENS IMPLANT  performed by Abeba Baires MD at [address] [place] [address] Left 3/25/2019    PHACOEMULSIFICATION WITH INTRAOCULAR LENS IMPLANT  performed by Abeba Baires MD at [facility] Left     TONSILLECTOMY         Immunization History:   Immunization History   Administered Date(s) Administered    COVID-19, PFIZER Bivalent, DO NOT Dilute, (age 12y+), IM, 30 mcg/0.3 mL 2022    COVID-19, PFIZER PURPLE top, DILUTE for use, (age 15 y+), 30mcg/0.3mL 2021, 2021, 10/13/2021       Active Problems:  Patient Active Problem List   Diagnosis Code    Difficulty with family Z63.9    Other and unspecified hyperlipidemia E78.5    Convulsions ([address]) R56.9    Pain in joint,

## 2023-10-08 NOTE — PLAN OF CARE
Problem: Discharge Planning  Goal: Discharge to home or other facility with appropriate resources  Outcome: Progressing  Flowsheets (Taken 10/8/2023 0051)  Discharge to home or other facility with appropriate resources:   Identify barriers to discharge with patient and caregiver   Arrange for needed discharge resources and transportation as appropriate     Problem: Confusion  Goal: Confusion, delirium, dementia, or psychosis is improved or at baseline  Description: INTERVENTIONS:  1. Assess for possible contributors to thought disturbance, including medications, impaired vision or hearing, underlying metabolic abnormalities, dehydration, psychiatric diagnoses, and notify attending LIP  2. [place] high risk fall precautions, as indicated  3. Provide frequent short contacts to provide reality reorientation, refocusing and direction  4. Decrease environmental stimuli, including noise as appropriate  5. Monitor and intervene to maintain adequate nutrition, hydration, elimination, sleep and activity  6. If unable to ensure safety without constant attention obtain sitter and review sitter guidelines with assigned personnel  7.  Initiate Psychosocial CNS and Spiritual Care consult, as indicated  Outcome: Completed  Flowsheets (Taken 10/6/2023 0905 by Esther Nguyen RN)  Effect of thought disturbance (confusion, delirium, dementia, or psychosis) are managed with adequate functional status: Assess for contributors to thought disturbance, including medications, impaired vision or hearing, underlying metabolic abnormalities, dehydration, psychiatric diagnoses, notify LIP     Problem: Safety - Adult  Goal: Free from fall injury  Outcome: Progressing  Flowsheets  Taken 10/8/2023 0051  Free From Fall Injury:   Instruct family/caregiver on patient safety   Based on caregiver fall risk screen, instruct family/caregiver to ask for assistance with transferring infant if caregiver noted to have fall risk factors  Taken 10/8/2023 0044  Free From Fall Injury:   Instruct family/caregiver on patient safety   Based on caregiver fall risk screen, instruct family/caregiver to ask for assistance with transferring infant if caregiver noted to have fall risk factors     Problem: Chronic Conditions and Co-morbidities  Goal: Patient's chronic conditions and co-morbidity symptoms are monitored and maintained or improved  Outcome: Progressing  Flowsheets (Taken 10/8/2023 0051)  Care Plan - Patient's Chronic Conditions and Co-Morbidity Symptoms are Monitored and Maintained or Improved:   Monitor and assess patient's chronic conditions and comorbid symptoms for stability, deterioration, or improvement   Collaborate with multidisciplinary team to address chronic and comorbid conditions and prevent exacerbation or deterioration   Update acute care plan with appropriate goals if chronic or comorbid symptoms are exacerbated and prevent overall improvement and discharge     Problem: Pain  Goal: Verbalizes/displays adequate comfort level or baseline comfort level  Outcome: Progressing  Flowsheets (Taken 10/8/2023 0051)  Verbalizes/displays adequate comfort level or baseline comfort level:   Administer analgesics based on type and severity of pain and evaluate response   Assess pain using appropriate pain scale   Encourage patient to monitor pain and request assistance

## 2023-10-08 NOTE — PLAN OF CARE
Problem: Discharge Planning  Goal: Discharge to home or other facility with appropriate resources  10/8/2023 0707 by Lora Farooq RN  Outcome: Progressing  Flowsheets (Taken 10/8/2023 0051 by Rex rBaxton RN)  Discharge to home or other facility with appropriate resources:   Identify barriers to discharge with patient and caregiver   Arrange for needed discharge resources and transportation as appropriate  10/8/2023 0051 by Rex Braxton RN  Outcome: Progressing  Flowsheets  Taken 10/8/2023 0051  Discharge to home or other facility with appropriate resources:   Identify barriers to discharge with patient and caregiver   Arrange for needed discharge resources and transportation as appropriate  Taken 10/7/2023 2015  Discharge to home or other facility with appropriate resources:   Identify barriers to discharge with patient and caregiver   Arrange for needed discharge resources and transportation as appropriate   Identify discharge learning needs (meds, wound care, etc)     Problem: Skin/Tissue Integrity  Goal: Absence of new skin breakdown  Description: 1. Monitor for areas of redness and/or skin breakdown  2. Assess vascular access sites hourly  3. Every 4-6 hours minimum:  Change oxygen saturation probe site  4. Every 4-6 hours:  If on nasal continuous positive airway pressure, respiratory therapy assess nares and determine need for appliance change or resting period. 10/8/2023 0707 by Lora Farooq RN  Outcome: Progressing  10/8/2023 0051 by Rex Braxton RN  Outcome: Progressing  Note: Александр score assessed. Patient able to ambulate and turn self. Repositioned patient Q2H and assessed skin. Educated patient on importance of repositioning to prevent skin issues.        Problem: Safety - Adult  Goal: Free from fall injury  10/8/2023 0707 by Lora Farooq RN  Outcome: Progressing  Flowsheets (Taken 10/8/2023 0051 by Rex Brxaton, RN)  Free From Fall Injury:   Instruct family/caregiver on patient

## 2023-10-08 NOTE — PROGRESS NOTES
PT AAO x4. Pt ambulating. Pt josi pain. Pt resting very well. Shift uneventful. No further needs voiced at this time. .Fall precautions in place. Bed alarm on. Call light within reach. Will continue care.  Electronically signed by Codie Hernandez RN on 10/8/2023 at 8:12 AM

## 2023-10-08 NOTE — PROGRESS NOTES
Physician Progress Note      Pankaj Guerrero  CSN #:                  [id number]  :                       1945  ADMIT DATE:       10/4/2023 9:16 PM  [facility] DATE:  RESPONDING  PROVIDER #:        RAFFI GAYTAN        QUERY TEXT:    Type of Encephalopathy: Please provide further specificity, if known. Clinical indicators include: metabolic acidosis, acute, encephalopathy,   altered mental status, acidosis  Options provided:  -- Anoxic/hypoxic encephalopathy  -- Metabolic encephalopathy  -- Toxic encephalopathy  -- Hepatic encephalopathy  -- Hypertensive encephalopathy  -- Other - I will add my own diagnosis  -- Disagree - Not applicable / Not valid  -- Disagree - Clinically Unable to determine / Unknown        PROVIDER RESPONSE TEXT:    The patient has toxic encephalopathy.       Electronically signed by:  Chencho Allen 10/8/2023 2:53 PM

## 2023-10-08 NOTE — DISCHARGE SUMMARY
Hospital Medicine Discharge Summary    Patient ID: Irma Gracia      Patient's PCP: Karyn Ovalles Date: 10/4/2023     Discharge Date:   Medically stable for discharge 10/8/2023, discharge order placed. Awaiting review with [facility]    Admitting Physician: Jeffrey Myers MD     Discharge Physician: EDDA Hahn - CNP     Discharge Diagnoses  Hallucinations  Toxic encephalopathy  Metabolic acidosis  Chronic sinusitis  Generalized weakness  NIDDM  Past medical history A-fib, hypertension      Hospital Course: 68-year-old female with past medical history of CVA, A-fib, type 2 diabetes, depression, anxiety, suicidal ideations presented to hospital with new hallucinations. She had recently been started on Ozempic and Abilify. Psychiatry was consulted and both were discontinued. Symptoms of hallucinations continue to significantly improve once these medications were stopped. There is also concern for sinusitis and ENT was consulted. Did not recommend any antibiotics, follow-up outpatient for possible biopsy of tissue. Eliquis was being held as there was thought that ENT may do procedure while in hospital, however this will not be done therefore Eliquis could be resumed. X-rays disease was consulted, who recommended to stop antibiotics as well. Patient was ready for discharge 10/6/2023, however was very weak and needs to go to [facility]. Therefore discharge was held. PT OT evaluated and patient does qualify for rehab. Pending [facility]. Hallucinations  Toxic encephalopathy  -Visual and auditory  -Resolved now  -Most likely secondary to Abilify and Ozempic, medications were discontinued and patient significantly improved  -Psychiatry consulted, discontinue the Abilify and Ozempic. Discontinue trazodone. Continue Seroquel 100 mg at bedtime and Cymbalta 60 mg twice daily. Adjusted Xanax dose to 0.5 mg twice daily.   Signed off.  -MRI brain nonacute, mucosal thickening of the left sphenoid sinus with near complete   opacification with central T2 hypointensity. Findings may be related to   inspissated secretions versus allergic fungal sinusitis. This is slightly   increased from the prior exam.   5. Trace left mastoid effusion         CT SINUS FOR IMAGE GUIDANCE   Final Result   There is no significant change in the opacification of the sphenoid sinus. Polypoid mucosal thickening seen within the maxillary sinuses bilaterally. CT Head W/O Contrast   Final Result   Mucosal thickening and hyperdense opacification of the sphenoid sinus,   consistent with sinusitis to include a fungal etiology. No evidence of bony   changes to the sphenoid sinus wall. XR CHEST PORTABLE   Final Result   No acute airspace disease identified. Consults:     IP CONSULT TO INFECTIOUS DISEASES  IP CONSULT TO OTOLARYNGOLOGY  IP CONSULT TO PSYCHIATRY    Disposition: [facility]    Condition at Discharge: Stable    Discharge Instructions/Follow-up:    Follow up with ENT in 2-4 weeks  Follow up with pcp in 1 week post discharge from [facility]     Code Status:  Full Code     Activity: activity as tolerated    Diet: cardiac diet      Discharge Medications:     Current Discharge Medication List             Details   metFORMIN (GLUCOPHAGE-XR) 500 MG extended release tablet Take 2 tablets by mouth daily (with breakfast)  Qty: 60 tablet, Refills: 5      melatonin 3 MG TABS tablet Take 3 tablets by mouth every evening  Qty: 30 tablet, Refills: 3                Details   HYDROcodone-acetaminophen (NORCO) 7.5-325 MG per tablet Take 1 tablet by mouth every 8 hours as needed for Pain for up to 3 days.  Max Daily Amount: 3 tablets  Qty: 9 tablet, Refills: 0    Comments: Reduce doses taken as pain becomes manageable  Associated Diagnoses: Chronic pain syndrome      furosemide (LASIX) 40 MG tablet Take 0.5 tablets by mouth daily as needed (for leg swellinig)  Qty: 30 tablet,

## 2023-10-08 NOTE — PROGRESS NOTES
Patient up in the bathroom sitting at sink. Patient remains AAOX4 and denies any hallucinations. Patient tolerating ambulation well as a contact guard. Morning medications administered. Head to toe assessment complete with changes documented. Patient denies any pain at this time. Fall precautions in place.   Electronically signed by Best Martinez RN on 10/8/2023 at 8:51 AM

## 2023-10-09 LAB
BACTERIA BLD CULT: NORMAL
GLUCOSE BLD-MCNC: 146 MG/DL (ref 70–99)
GLUCOSE BLD-MCNC: 148 MG/DL (ref 70–99)
GLUCOSE BLD-MCNC: 198 MG/DL (ref 70–99)
GLUCOSE BLD-MCNC: 202 MG/DL (ref 70–99)
PERFORMED ON: ABNORMAL

## 2023-10-09 PROCEDURE — 6370000000 HC RX 637 (ALT 250 FOR IP): Performed by: STUDENT IN AN ORGANIZED HEALTH CARE EDUCATION/TRAINING PROGRAM

## 2023-10-09 PROCEDURE — 6370000000 HC RX 637 (ALT 250 FOR IP): Performed by: REGISTERED NURSE

## 2023-10-09 PROCEDURE — 97535 SELF CARE MNGMENT TRAINING: CPT

## 2023-10-09 PROCEDURE — 1200000000 HC SEMI PRIVATE

## 2023-10-09 PROCEDURE — 2580000003 HC RX 258: Performed by: STUDENT IN AN ORGANIZED HEALTH CARE EDUCATION/TRAINING PROGRAM

## 2023-10-09 PROCEDURE — 94760 N-INVAS EAR/PLS OXIMETRY 1: CPT

## 2023-10-09 PROCEDURE — 97530 THERAPEUTIC ACTIVITIES: CPT

## 2023-10-09 RX ADMIN — DULOXETINE HYDROCHLORIDE 60 MG: 60 CAPSULE, DELAYED RELEASE ORAL at 20:15

## 2023-10-09 RX ADMIN — INSULIN LISPRO 1 UNITS: 100 INJECTION, SOLUTION INTRAVENOUS; SUBCUTANEOUS at 13:24

## 2023-10-09 RX ADMIN — ATENOLOL 50 MG: 50 TABLET ORAL at 08:23

## 2023-10-09 RX ADMIN — APIXABAN 5 MG: 5 TABLET, FILM COATED ORAL at 08:23

## 2023-10-09 RX ADMIN — Medication 10 ML: at 08:24

## 2023-10-09 RX ADMIN — APIXABAN 5 MG: 5 TABLET, FILM COATED ORAL at 20:15

## 2023-10-09 RX ADMIN — DULOXETINE HYDROCHLORIDE 60 MG: 60 CAPSULE, DELAYED RELEASE ORAL at 08:23

## 2023-10-09 RX ADMIN — PANTOPRAZOLE SODIUM 40 MG: 40 TABLET, DELAYED RELEASE ORAL at 06:37

## 2023-10-09 RX ADMIN — Medication 9 MG: at 19:03

## 2023-10-09 RX ADMIN — LISINOPRIL 20 MG: 10 TABLET ORAL at 08:23

## 2023-10-09 RX ADMIN — Medication 10 ML: at 20:17

## 2023-10-09 RX ADMIN — QUETIAPINE FUMARATE 100 MG: 25 TABLET ORAL at 19:03

## 2023-10-09 RX ADMIN — ALPRAZOLAM 0.5 MG: 0.5 TABLET ORAL at 13:30

## 2023-10-09 ASSESSMENT — PAIN SCALES - GENERAL: PAINLEVEL_OUTOF10: 0

## 2023-10-09 NOTE — PLAN OF CARE
Problem: Discharge Planning  Goal: Discharge to home or other facility with appropriate resources  Outcome: Progressing  Flowsheets (Taken 10/8/2023 2259)  Discharge to home or other facility with appropriate resources:   Identify barriers to discharge with patient and caregiver   Identify discharge learning needs (meds, wound care, etc)     Problem: Safety - Adult  Goal: Free from fall injury  Outcome: Progressing  Flowsheets (Taken 10/8/2023 2259)  Free From Fall Injury:   Instruct family/caregiver on patient safety   Based on caregiver fall risk screen, instruct family/caregiver to ask for assistance with transferring infant if caregiver noted to have fall risk factors     Problem: Chronic Conditions and Co-morbidities  Goal: Patient's chronic conditions and co-morbidity symptoms are monitored and maintained or improved  Outcome: Progressing  Flowsheets (Taken 10/8/2023 2259)  Care Plan - Patient's Chronic Conditions and Co-Morbidity Symptoms are Monitored and Maintained or Improved:   Monitor and assess patient's chronic conditions and comorbid symptoms for stability, deterioration, or improvement   Collaborate with multidisciplinary team to address chronic and comorbid conditions and prevent exacerbation or deterioration     Problem: Pain  Goal: Verbalizes/displays adequate comfort level or baseline comfort level  Outcome: Progressing  Flowsheets (Taken 10/8/2023 2259)  Verbalizes/displays adequate comfort level or baseline comfort level:   Encourage patient to monitor pain and request assistance   Administer analgesics based on type and severity of pain and evaluate response     Problem: ABCDS Injury Assessment  Goal: Absence of physical injury  Outcome: Progressing  Flowsheets (Taken 10/8/2023 2259)  Absence of Physical Injury: Implement safety measures based on patient assessment

## 2023-10-09 NOTE — CARE COORDINATION
Left message for Jayleen Porras at [facility] to check on status of precert. Electronically signed by SATURNINO Joel LISW, Case Management on 10/9/2023 at 11:21 AM  [facility] [id number]    85:97 AM  Precert still pending per Jayleen Porras.     Electronically signed by SATURNINO Joel LISW, Case Management on 10/9/2023 at 11:41 AM  [facility] [id number]

## 2023-10-09 NOTE — PROGRESS NOTES
Patient currently in chair, awake, a&ox4. Patient took medications whole, no issues. Patient IV flushed and patent. Patient voiding well, no complications. Patient denies pain at this time. Patient tolerating PO intake, and denies n/v. Patient able to make needs known. No needs mentioned at this time. Call light and bedside table within reach. Will continue to monitor and reassess.

## 2023-10-09 NOTE — PROGRESS NOTES
Occupational Therapy  Facility/Department: [address] ORTHOPEDICS  Occupational Therapy Daily Treatment Note    Name: Bony Byrne  : 2057  MRN: [id number]  Date of Service: 10/9/2023    Discharge Recommendations:  5-7 sessions per week, Patient would benefit from continued therapy after discharge, Continue to assess pending progress  OT Equipment Recommendations  Other: defer to next level of care, appears to have needed bathroom DME already and owns a 4WW for community mobility. Bony Byrne scored a 19/24 on the AM-PAC ADL Inpatient form. Current research shows that an AM-PAC score of 17 or less is typically not associated with a discharge to the patient's home setting. Based on the patient's AM-PAC score and their current ADL deficits, it is recommended that the patient have 5-7 sessions per week of Occupational Therapy at d/c to increase the patient's independence. At this time, this patient demonstrates complex nursing, medical, and rehabilitative needs, and would benefit from intensive rehabilitation services upon discharge from the Inpatient setting. This patient demonstrates the ability to participate in and benefit from an intensive therapy program with a coordinated interdisciplinary team approach to foster frequent, structured, and documented communication among disciplines, who will work together to establish, prioritize, and achieve treatment goals. Please see assessment section for further patient specific details. If patient discharges prior to next session this note will serve as a discharge summary. Please see below for the latest assessment towards goals. Patient Diagnosis(es): The primary encounter diagnosis was Altered mental status, unspecified altered mental status type. Diagnoses of Hallucinations, Sphenoid sinusitis, unspecified chronicity, H/O: CVA (cerebrovascular accident), and Chronic pain syndrome were also pertinent to this visit.   Past Medical History:  has a education & training, Equipment evaluation, education, & procurement, Positioning     Restrictions  Restrictions/Precautions  Restrictions/Precautions: Fall Risk  Position Activity Restriction  Other position/activity restrictions: Orth[employer]atics 10-6-23: supine: 152/78, 97% HR 66;  seated: 114/73 98% HR 93;  Standin/63 94% HR76. RN informed. Subjective   General  Chart Reviewed: Yes, Orders, Progress Notes, History and Physical  Patient assessed for rehabilitation services?: Yes  Additional Pertinent Hx: \"74 y.o. female with history of DM2, A-fib, history of CVA with residual speech difficulties, depression, anxiety, history of suicidal ideations on Seroquel, Xanax, duloxetine who presented from home due to new hallucinations. \" Copied per Nga Perez MD H & P note on 10/4/23. Pt admitted w/ Sinusitis chronic, sphenoidal.  Response to previous treatment: Patient with no complaints from previous session  Family / Caregiver Present: No  Referring Practitioner: Mars Lema MD  Diagnosis: Sinusitis chronic, sphenoidal  Subjective  Subjective: Pt met bedside upon arrival. pleasant and agreeable to OT tx session. Denies any pain. Social/Functional History  Social/Functional History  Lives With: Daughter (dtr works full time, teacher)  Type of Home: Condo  Home Layout: One level  Home Access: Stairs to enter without rails  Entrance Stairs - Number of Steps: 1  Bathroom Shower/Tub: Walk-in shower, Doors  Bathroom Toilet: Standard (with riser. Pt has wall w/ partition next to toilet for support.)  Bathroom Equipment: Grab bars in shower, Built-in shower seat  Home Equipment: Walker, rolling, Walker, 4 wheeled, [place], [place], quad, Alert Button  Has the patient had two or more falls in the past year or any fall with injury in the past year?: Yes (Pt reports that she has had 3-4 falls in the past year.  Pt's son reports it has been much more than that.)  ADL Assistance: Independent  Homemaking Assistance: Strategies; Equipment  Education Provided Comments: use of RW for support during ambulation  Education Method: Demonstration;Verbal  Barriers to Learning: None  Education Outcome: Verbalized understanding;Continued education needed;Demonstrated understanding                      AM-PAC Score        AM-PAC Inpatient Daily Activity Raw Score: 19 (10/09/23 0922)  AM-PAC Inpatient ADL T-Scale Score : 40.22 (10/09/23 1182)  ADL Inpatient CMS 0-100% Score: 42.8 (10/09/23 0922)  ADL Inpatient CMS G-Code Modifier : CK (10/09/23 7355)    Goals  Short Term Goals  Time Frame for Short Term Goals: Until D/C: all goals ongoing 10/9  Short Term Goal 1: Pt will be mod I for toileting w/ use of grab bars. Short Term Goal 2: Pt will be mod I for LB bathing and dressing. Short Term Goal 3: Pt will be mod I for transfers and ADL mobility. Short Term Goal 4: Pt will participate in BUE ther ex/ HEP in order to increase activity tolerance/balance to perform IADL mobility tasks carrying object(s) w/ supervision. Long Term Goals  Time Frame for Long Term Goals : same as STGs  Patient Goals   Patient goals : Pt reports that she wants to be able to walk in [facility] w/o AD to carry her potluck dinner.        Therapy Time   Individual Concurrent Group Co-treatment   Time In 0830         Time Out 0910         Minutes 40         Timed Code Treatment Minutes: 40 Minutes       Electronically signed by Anice Schirmer, OTR/L on 10/9/2023 at 11:21 AM

## 2023-10-10 VITALS
OXYGEN SATURATION: 95 % | HEIGHT: 66 IN | RESPIRATION RATE: 16 BRPM | DIASTOLIC BLOOD PRESSURE: 81 MMHG | TEMPERATURE: 98.2 F | HEART RATE: 80 BPM | WEIGHT: 178.79 LBS | BODY MASS INDEX: 28.73 KG/M2 | SYSTOLIC BLOOD PRESSURE: 119 MMHG

## 2023-10-10 LAB
GLUCOSE BLD-MCNC: 157 MG/DL (ref 70–99)
GLUCOSE BLD-MCNC: 180 MG/DL (ref 70–99)
PERFORMED ON: ABNORMAL
PERFORMED ON: ABNORMAL

## 2023-10-10 PROCEDURE — 97110 THERAPEUTIC EXERCISES: CPT

## 2023-10-10 PROCEDURE — 6370000000 HC RX 637 (ALT 250 FOR IP): Performed by: REGISTERED NURSE

## 2023-10-10 PROCEDURE — 97116 GAIT TRAINING THERAPY: CPT

## 2023-10-10 PROCEDURE — 94760 N-INVAS EAR/PLS OXIMETRY 1: CPT

## 2023-10-10 PROCEDURE — 6370000000 HC RX 637 (ALT 250 FOR IP): Performed by: STUDENT IN AN ORGANIZED HEALTH CARE EDUCATION/TRAINING PROGRAM

## 2023-10-10 RX ORDER — FUROSEMIDE 40 MG/1
20 TABLET ORAL DAILY PRN
Qty: 30 TABLET | Refills: 3 | Status: SHIPPED | OUTPATIENT
Start: 2023-10-10

## 2023-10-10 RX ORDER — LANOLIN ALCOHOL/MO/W.PET/CERES
9 CREAM (GRAM) TOPICAL EVERY EVENING
Qty: 30 TABLET | Refills: 3 | Status: SHIPPED | OUTPATIENT
Start: 2023-10-10

## 2023-10-10 RX ORDER — METFORMIN HYDROCHLORIDE 500 MG/1
1000 TABLET, EXTENDED RELEASE ORAL
Qty: 60 TABLET | Refills: 5 | Status: SHIPPED | OUTPATIENT
Start: 2023-10-10

## 2023-10-10 RX ADMIN — ATENOLOL 50 MG: 50 TABLET ORAL at 07:59

## 2023-10-10 RX ADMIN — LISINOPRIL 20 MG: 10 TABLET ORAL at 07:59

## 2023-10-10 RX ADMIN — PANTOPRAZOLE SODIUM 40 MG: 40 TABLET, DELAYED RELEASE ORAL at 05:58

## 2023-10-10 RX ADMIN — ALPRAZOLAM 0.5 MG: 0.5 TABLET ORAL at 09:26

## 2023-10-10 RX ADMIN — DULOXETINE HYDROCHLORIDE 60 MG: 60 CAPSULE, DELAYED RELEASE ORAL at 07:59

## 2023-10-10 RX ADMIN — APIXABAN 5 MG: 5 TABLET, FILM COATED ORAL at 07:59

## 2023-10-10 NOTE — CARE COORDINATION
10/10/23 1417   IMM Letter   IMM Letter given to Patient/Family/Significant other/Guardian/POA/by: second IMM letter explained to pt and copy provided per CARIDAD Alves RN   IMM Letter date given: 10/10/23   IMM Letter time given: 3656     Education provided to patient. Patient reported no questions and verbalized understanding. Patient made aware that he/she has 4 hours prior to discharging from hospital to decide if he/she wishes to pursue the Medicare appeal process.       Electronically signed by Lora Baum on 10/10/2023 at 2:18 PM  #[phone]

## 2023-10-10 NOTE — PROGRESS NOTES
Occupational Therapy    OT to pt's room to see for tx session this date. Pt in bed resting upon arrival and reporting she just got back from walking the hallway with RN Abby Oleary. Pt reporting being very depressed waiting to hear if [facility] will accept or not and just sitting in this hospital room all day. Agreeable to therapy coming back later this date. Will attempt back as therapy schedule allows.      Electronically signed by KENDRA Jose on 10/10/2023 at 1:34 PM

## 2023-10-10 NOTE — PROGRESS NOTES
Patient alert and oriented x4, discharged to home  with documented belongings. Transported out of hospital via wheelchair by transport staff. Reviewed discharge, follow up, and medication instructions with patient and patient verbalized understanding. Prescriptions signed and handed to patient.

## 2023-10-10 NOTE — PLAN OF CARE
Problem: Discharge Planning  Goal: Discharge to home or other facility with appropriate resources  Outcome: Progressing  Flowsheets (Taken 10/9/2023 2113 by Davis Hernandez, RN)  Discharge to home or other facility with appropriate resources:   Identify barriers to discharge with patient and caregiver   Identify discharge learning needs (meds, wound care, etc)     Problem: Skin/Tissue Integrity  Goal: Absence of new skin breakdown  Description: 1. Monitor for areas of redness and/or skin breakdown  2. Assess vascular access sites hourly  3. Every 4-6 hours minimum:  Change oxygen saturation probe site  4. Every 4-6 hours:  If on nasal continuous positive airway pressure, respiratory therapy assess nares and determine need for appliance change or resting period.   Outcome: Progressing  Note: Patient will remain free from new skin breakdown during inpatient stay      Problem: Safety - Adult  Goal: Free from fall injury  Outcome: Progressing  Flowsheets (Taken 10/9/2023 2113 by Davis Hernandez, RN)  Free From Fall Injury:   Instruct family/caregiver on patient safety   Based on caregiver fall risk screen, instruct family/caregiver to ask for assistance with transferring infant if caregiver noted to have fall risk factors     Problem: Chronic Conditions and Co-morbidities  Goal: Patient's chronic conditions and co-morbidity symptoms are monitored and maintained or improved  Outcome: Progressing  Flowsheets (Taken 10/9/2023 2113 by Davis Hernandez, RN)  Care Plan - Patient's Chronic Conditions and Co-Morbidity Symptoms are Monitored and Maintained or Improved:   Monitor and assess patient's chronic conditions and comorbid symptoms for stability, deterioration, or improvement   Collaborate with multidisciplinary team to address chronic and comorbid conditions and prevent exacerbation or deterioration     Problem: Pain  Goal: Verbalizes/displays adequate comfort level or baseline comfort level  Outcome:

## 2023-10-10 NOTE — PLAN OF CARE
Problem: Discharge Planning  Goal: Discharge to home or other facility with appropriate resources  Outcome: Progressing  Flowsheets  Taken 10/9/2023 2113 by Mary Luna RN  Discharge to home or other facility with appropriate resources:   Identify barriers to discharge with patient and caregiver   Identify discharge learning needs (meds, wound care, etc)  Taken 10/9/2023 0827 by Rubin Tadeo RN  Discharge to home or other facility with appropriate resources: Identify barriers to discharge with patient and caregiver     Problem: Safety - Adult  Goal: Free from fall injury  Outcome: Progressing  Flowsheets (Taken 10/9/2023 2113)  Free From Fall Injury:   Instruct family/caregiver on patient safety   Based on caregiver fall risk screen, instruct family/caregiver to ask for assistance with transferring infant if caregiver noted to have fall risk factors     Problem: Chronic Conditions and Co-morbidities  Goal: Patient's chronic conditions and co-morbidity symptoms are monitored and maintained or improved  Outcome: Progressing  Flowsheets  Taken 10/9/2023 2113 by Mary Luna [facility] - Patient's Chronic Conditions and Co-Morbidity Symptoms are Monitored and Maintained or Improved:   Monitor and assess patient's chronic conditions and comorbid symptoms for stability, deterioration, or improvement   Collaborate with multidisciplinary team to address chronic and comorbid conditions and prevent exacerbation or deterioration  Taken 10/9/2023 0827 by Rubin Tadeo RN  Care Plan - Patient's Chronic Conditions and Co-Morbidity Symptoms are Monitored and Maintained or Improved: Monitor and assess patient's chronic conditions and comorbid symptoms for stability, deterioration, or improvement     Problem: Pain  Goal: Verbalizes/displays adequate comfort level or baseline comfort level  Outcome: Progressing  Flowsheets (Taken 10/9/2023 2113)  Verbalizes/displays adequate comfort level or baseline

## 2023-10-10 NOTE — CARE COORDINATION
Call placed to [facility] w/ [facility] #[phone]  Precert remains pending   Electronically signed by Lanny Fontenot on 10/10/2023 at 11:50 AM  #[phone]

## 2023-10-10 NOTE — CARE COORDINATION
Received call from Laura w/ [facility]- rehab was denied .   Informed patient at bedside- she verbalized understanding   She would like to resume her home care services w/ [facility]  Called and spoke w/ Devaughn Most with [facility] # [phone]--HERIBERTO will pull orders from [facility] [place]: 10/10/2023    DISCHARGE DESTINATION: home w/ daughter    HOME CARE: Yes    Agency Name: [facility]  Discharging to Facility/ Agency   Name: [facility]  Address:  [address] [place] [address] [address] [place] [address], [address] [place] [address]   Phone:  [phone]  Fax:  [phone]    Notified: RN, Family, and Facility/Agency    TRANSPORTATION: Private Car    NEW DME ORDERED: N/A    Electronically signed by Janith Kawasaki on 10/10/2023 at 2:23 PM  #[phone]

## 2023-10-10 NOTE — PROGRESS NOTES
Physical Therapy  Facility/Department: [address] ORTHOPEDICS  Physical Therapy treatment session    Name: Jaida Snow  : [birth year]  MRN: [id number]  Date of Service: 10/10/2023    Discharge Recommendations:  Therapy recommended at discharge, Patient able to tolerate 3hrs of therapy a day   PT Equipment Recommendations  Equipment Needed: No  Other: Has 2 wh walker and rollator. Jaida Snow scored a 19/24 on the AM-PAC short mobility form. Current research shows that an AM-PAC score of 17 or less is typically not associated with a discharge to the patient's home setting. Based on the patient's AM-PAC score and their current functional mobility deficits, it is recommended that the patient have 5-7 sessions per week of Physical Therapy at d/c to increase the patient's independence. At this time, this patient demonstrates complex nursing, medical, and rehabilitative needs, and would benefit from intensive rehabilitation services upon discharge from the Inpatient setting. This patient demonstrates the ability to participate in and benefit from an intensive therapy program with a coordinated interdisciplinary team approach to foster frequent, structured, and documented communication among disciplines, who will work together to establish, prioritize, and achieve treatment goals. Please see assessment section for further patient specific details. If patient discharges prior to next session this note will serve as a discharge summary. Please see below for the latest assessment towards goals. Patient Diagnosis(es): The primary encounter diagnosis was Altered mental status, unspecified altered mental status type. Diagnoses of Hallucinations, Sphenoid sinusitis, unspecified chronicity, H/O: CVA (cerebrovascular accident), and Chronic pain syndrome were also pertinent to this visit.   Past Medical History:  has a past medical history of Arthritis, Back pain, Cancer ([address]), Cerebral artery occlusion with cerebral infarction ([address]), Diabetes, High blood pressure, Hyperlipidemia, Seizure ([address]), SVT (supraventricular tachycardia) ([address]), and Type II or unspecified type diabetes mellitus without mention of complication, not stated as uncontrolled. Past Surgical History:  has a past surgical history that includes back surgery; Carpal tunnel release; Hysterectomy; Tonsillectomy; lymph node biopsy (Left); Colonoscopy; Intracapsular cataract extraction (Right, 3/11/2019); Breast lumpectomy (Left); Intracapsular cataract extraction (Left, 3/25/2019); and Hip fracture surgery (Right, 3/18/2020). Assessment   Body Structures, Functions, Activity Limitations Requiring Skilled Therapeutic Intervention: Decreased functional mobility ; Decreased strength;Decreased body mechanics  Assessment: Per H and P:  \"74 y.o. female with history of DM2, A-fib, history of CVA with residual speech difficulties, depression, anxiety, history of suicidal ideations on Seroquel, Xanax, duloxetine who presented from home due to new hallucinations. Daughter at bedside reports that patient has had waxing and waning mentation in the past, but tonight is the first time that she has had hallucinations. Prior to that, she had being in bed for most of the day for the last 3 days. [place] Work up showed mild hyponatremia to 130. CT head shows sphenoid sinusitis with hyperdense opacification that could not rule out fungal etiology. No evidence of bony erosion\"  Other PMHx:  Fall, with R femur fracture with gamma nailing March 2018, Back sx x 2. RFA back January 2023. Prior status:  [facility]. Dtr works full time. Patient / family reports patient strength/functional tolerances have been waxing/waning since multiple medical events in recent years (hip fracture, 2 CVAs). She typically ambulates without device, drives, and independently performs ADLs and light IADLs.   Today, pt required Brian to ambulate without an AD back January 2023. Response To Previous Treatment: Patient with no complaints from previous session. Family / Caregiver Present: No  Referring Practitioner: Kimberlyn Gutierrez MD  Referral Date : 10/06/23  Follows Commands: Within Functional Limits  Subjective  Subjective: Pt in chair upon arrival.  Agreeable to PT treatment. Hopeful for d/c to [facility] but awaiting insurance precert. Reports chronic back pain and RLE pain from sciatica. Social/Functional History  Social/Functional History  Lives With: Daughter (dtr works full time, teacher)  Type of Home: Condo  Home Layout: One level  Home Access: Stairs to enter without rails  Entrance Stairs - Number of Steps: 1  Bathroom Shower/Tub: Walk-in shower, Doors  Bathroom Toilet: Standard (with riser. Pt has wall w/ partition next to toilet for support.)  Bathroom Equipment: Grab bars in shower, Built-in shower seat  Home Equipment: Walker, rolling, Walker, 4 wheeled, [place], quad, Alert Button  Has the patient had two or more falls in the past year or any fall with injury in the past year?: Yes (Pt reports that she has had 3-4 falls in the past year. Pt's son reports it has been much more than that.)  ADL Assistance: Independent  Homemaking Assistance: Independent (dtr for heavy cleaning.)  Ambulation Assistance: Independent (Slides to floor from EOB and crawls to bathroom, usually in AM, 2-3 x per week. This is due to bad back.)  Transfer Assistance: Independent  Active : Yes  Additional Comments: sleeps in head-adjustable bed.              Objective      Bed mobility  Bed Mobility Comments: not tested- up in chair at beginning and end of session    Transfers  Sit to Stand: Stand by assistance  Stand to Sit: Stand by assistance    Ambulation  Surface: Level tile  Device: No Device  Assistance: Minimal assistance  Quality of Gait: R lean intermittently, decreased trunk control, high fall risk without an AD  Gait Deviations: Increased

## 2024-01-26 ENCOUNTER — APPOINTMENT (OUTPATIENT)
Dept: GENERAL RADIOLOGY | Age: 79
End: 2024-01-26
Payer: MEDICARE

## 2024-01-26 ENCOUNTER — APPOINTMENT (OUTPATIENT)
Dept: CT IMAGING | Age: 79
End: 2024-01-26
Payer: MEDICARE

## 2024-01-26 ENCOUNTER — HOSPITAL ENCOUNTER (EMERGENCY)
Age: 79
Discharge: HOME | End: 2024-01-26
Attending: EMERGENCY MEDICINE
Payer: MEDICARE

## 2024-01-26 VITALS
BODY MASS INDEX: 29.69 KG/M2 | HEART RATE: 77 BPM | WEIGHT: 184.75 LBS | RESPIRATION RATE: 16 BRPM | HEIGHT: 66 IN | OXYGEN SATURATION: 95 % | TEMPERATURE: 98.2 F | SYSTOLIC BLOOD PRESSURE: 182 MMHG | DIASTOLIC BLOOD PRESSURE: 102 MMHG

## 2024-01-26 DIAGNOSIS — S42.295A OTHER CLOSED NONDISPLACED FRACTURE OF PROXIMAL END OF LEFT HUMERUS, INITIAL ENCOUNTER: Primary | ICD-10-CM

## 2024-01-26 PROCEDURE — 90471 IMMUNIZATION ADMIN: CPT | Performed by: EMERGENCY MEDICINE

## 2024-01-26 PROCEDURE — 73060 X-RAY EXAM OF HUMERUS: CPT

## 2024-01-26 PROCEDURE — 90715 TDAP VACCINE 7 YRS/> IM: CPT | Performed by: EMERGENCY MEDICINE

## 2024-01-26 PROCEDURE — 6370000000 HC RX 637 (ALT 250 FOR IP): Performed by: EMERGENCY MEDICINE

## 2024-01-26 PROCEDURE — 6360000002 HC RX W HCPCS: Performed by: EMERGENCY MEDICINE

## 2024-01-26 PROCEDURE — 99284 EMERGENCY DEPT VISIT MOD MDM: CPT

## 2024-01-26 PROCEDURE — 71250 CT THORAX DX C-: CPT

## 2024-01-26 RX ORDER — BACITRACIN ZINC AND POLYMYXIN B SULFATE 500; 1000 [USP'U]/G; [USP'U]/G
OINTMENT TOPICAL ONCE
Status: COMPLETED | OUTPATIENT
Start: 2024-01-26 | End: 2024-01-26

## 2024-01-26 RX ORDER — HYDROCODONE BITARTRATE AND ACETAMINOPHEN 5; 325 MG/1; MG/1
1 TABLET ORAL EVERY 6 HOURS PRN
Qty: 12 TABLET | Refills: 0 | Status: SHIPPED | OUTPATIENT
Start: 2024-01-26 | End: 2024-01-29

## 2024-01-26 RX ORDER — HYDROCODONE BITARTRATE AND ACETAMINOPHEN 5; 325 MG/1; MG/1
1 TABLET ORAL ONCE
Status: COMPLETED | OUTPATIENT
Start: 2024-01-26 | End: 2024-01-26

## 2024-01-26 RX ADMIN — TETANUS TOXOID, REDUCED DIPHTHERIA TOXOID AND ACELLULAR PERTUSSIS VACCINE, ADSORBED 0.5 ML: 5; 2.5; 8; 8; 2.5 SUSPENSION INTRAMUSCULAR at 17:47

## 2024-01-26 RX ADMIN — BACITRACIN ZINC AND POLYMYXIN B SULFATE: 500; 10000 OINTMENT TOPICAL at 17:48

## 2024-01-26 RX ADMIN — HYDROCODONE BITARTRATE AND ACETAMINOPHEN 1 TABLET: 5; 325 TABLET ORAL at 18:04

## 2024-01-26 ASSESSMENT — PAIN DESCRIPTION - PAIN TYPE
TYPE: ACUTE PAIN

## 2024-01-26 ASSESSMENT — PAIN DESCRIPTION - LOCATION
LOCATION: ARM
LOCATION: ARM;RIB CAGE
LOCATION: ARM;RIB CAGE

## 2024-01-26 ASSESSMENT — PAIN DESCRIPTION - DESCRIPTORS
DESCRIPTORS: ACHING

## 2024-01-26 ASSESSMENT — PAIN SCALES - GENERAL
PAINLEVEL_OUTOF10: 2

## 2024-01-26 ASSESSMENT — ENCOUNTER SYMPTOMS
ABDOMINAL PAIN: 0
SORE THROAT: 0
SHORTNESS OF BREATH: 0
COUGH: 0

## 2024-01-26 ASSESSMENT — PAIN DESCRIPTION - FREQUENCY
FREQUENCY: CONTINUOUS

## 2024-01-26 ASSESSMENT — PAIN - FUNCTIONAL ASSESSMENT
PAIN_FUNCTIONAL_ASSESSMENT: 0-10
PAIN_FUNCTIONAL_ASSESSMENT: 0-10

## 2024-01-26 ASSESSMENT — PAIN DESCRIPTION - ORIENTATION
ORIENTATION: LEFT

## 2024-01-26 NOTE — ED NOTES
Abrasion right elbow. Cleaned area with normal saline. Applied polysporin and band-aid to right elbow.

## 2024-01-26 NOTE — ED PROVIDER NOTES
[facility]  EMERGENCY DEPARTMENT ENCOUNTER      Pt Name: Regi Roche  MRN: [id number]  Birthdate 1945  Date of evaluation: 1/26/2024  Provider: MANDI LINTON MD    CHIEF COMPLAINT       Chief Complaint   Patient presents with    Fall    Arm Injury    Rib Injury     Patient Tripped over curb today at 2:30 pm. She fell on her stomach. C/o pain left upper arm and left rib pain. She denies hitting her head. No loss of consciousness. She does take blood thinners         HISTORY OF PRESENT ILLNESS   (Location/Symptom, Timing/Onset, Context/Setting, Quality, Duration, Modifying Factors, Severity)  Note limiting factors.   Regi Roche is a 78 y.o. female with   Past Medical History:   Diagnosis Date    Arthritis     Back pain     Cancer (HCC)     breast, Left Lumpectomy and radiation      Cerebral artery occlusion with cerebral infarction (HCC)     Diabetes     High blood pressure     Hyperlipidemia     Seizure (HCC)     10 yrs ago     SVT (supraventricular tachycardia)     Type II or unspecified type diabetes mellitus without mention of complication, not stated as uncontrolled     who presents to the emergency department with the chief complaint of   Chief Complaint   Patient presents with    Fall    Arm Injury    Rib Injury     Patient Tripped over curb today at 2:30 pm. She fell on her stomach. C/o pain left upper arm and left rib pain. She denies hitting her head. No loss of consciousness. She does take blood thinners   . The patient comes in after a fall.  The patient states she tripped on the curb.  She was not using a walker although she had been recommended to do that in the past.  The patient states she struck her left rib cage and her left upper arm.  She states she did not hit her head.  She has no neck or back pain.  She does have chronic back pain but there is no change and it does not hurt anymore than it did.  The patient has any abdominal pain.  She does

## 2024-01-27 NOTE — ED NOTES
LYNNE Haas  placed sling on left arm. Patient states, her pain is down 4/10 after pain medication. LYNNE Haas gave patient discharge instructions. Patient discharged home with her daughter

## 2024-01-29 ENCOUNTER — OFFICE VISIT (OUTPATIENT)
Dept: ORTHOPEDIC SURGERY | Age: 79
End: 2024-01-29

## 2024-01-29 VITALS — BODY MASS INDEX: 29.57 KG/M2 | RESPIRATION RATE: 16 BRPM | HEIGHT: 66 IN | WEIGHT: 184 LBS

## 2024-01-29 DIAGNOSIS — S42.202A CLOSED FRACTURE OF PROXIMAL END OF LEFT HUMERUS, UNSPECIFIED FRACTURE MORPHOLOGY, INITIAL ENCOUNTER: Primary | ICD-10-CM

## 2024-01-29 NOTE — PROGRESS NOTES
[facility]  Office Visit    Chief Complaint: Left shoulder injury    HPI:  Regi Roche is a 78 y.o. who is here for initial evaluation of the left shoulder injury.  She missed a step and fell off of a curb 3 days ago.  She was seen in the ER for this issue.  She is right-hand dominant.  She denies a prior history of injury or surgery to the left upper extremity.  She is here in a sling today and walks with use of a walker.  She reports pain in the left shoulder and denies pain elsewhere.  She denies radiating pain, numbness, tingling in the left upper extremity.  She has diabetes and is on Eliquis for history of stroke.      Past Medical History:   Diagnosis Date    Arthritis     Back pain     Cancer (HCC)     breast, Left Lumpectomy and radiation      Cerebral artery occlusion with cerebral infarction (HCC)     Diabetes     High blood pressure     Hyperlipidemia     Seizure (HCC)     10 yrs ago     SVT (supraventricular tachycardia)     Type II or unspecified type diabetes mellitus without mention of complication, not stated as uncontrolled         ROS:  Constitutional: denies fever, chills, weight loss  MSK: denies pain in other joints, muscle aches  Neurological: denies numbness, tingling, weakness    Exam:  Resp 16   Ht 1.676 m (5' 6\")   Wt 83.5 kg (184 lb)   BMI 29.70 kg/m²      Appearance: sitting in exam room chair, appears to be in no acute distress, awake and alert  Resp: unlabored breathing on room air  Skin: warm, dry and intact with out erythema or significant increased temperature  LUE: Sensation intact light touch in axillary, radial, ulnar, median nerve distributions.  Palpable radial pulse.  Tender over shoulder.  Nontender over elbow and wrist.  She demonstrates active wrist flexion and extension.    Imaging:  3 views of the left shoulder were performed and interpreted today.  Prior left shoulder radiographs and CT of the chest was reviewed as well.  Significant

## 2024-02-22 ENCOUNTER — OFFICE VISIT (OUTPATIENT)
Dept: ORTHOPEDIC SURGERY | Age: 79
End: 2024-02-22

## 2024-02-22 VITALS — HEIGHT: 66 IN | RESPIRATION RATE: 16 BRPM | WEIGHT: 184 LBS | BODY MASS INDEX: 29.57 KG/M2

## 2024-02-22 DIAGNOSIS — S42.202S CLOSED FRACTURE OF PROXIMAL END OF LEFT HUMERUS, UNSPECIFIED FRACTURE MORPHOLOGY, SEQUELA: Primary | ICD-10-CM

## 2024-02-24 NOTE — PROGRESS NOTES
temperature around the knee joint(s).     There are no cutaneous lesions or lymphadenopathy present.    X-RAYS:  X-rays taken the office today show no significant changes from the previous x-ray perhaps more callus formation around the fracture line area but overall acceptable satisfactory alignment       Assessment:  Healing greater tuberosity fracture    Plan:  During today's visit, there was approximately 20 minutes of face-to-face discussion in regards to the patient's current condition and treatment options. More than 50 % of the time was counseling and coordination of care as indicated above.  Not only is her tenderness in her ability to move her arm improving she is demonstrating that her rotator cuff is functioning.      PROCEDURE NOTE:  X-rays examination discussion      They will schedule a follow up in we went through can have a maintenance exercise program she is can be out of the sling she will be returning to activities she understands not to chicken wing or twist it and she was given a note to say from an orthopedic standpoint she can undergo sinus surgery

## 2024-10-23 ENCOUNTER — APPOINTMENT (OUTPATIENT)
Dept: GENERAL RADIOLOGY | Age: 79
DRG: 305 | End: 2024-10-23
Payer: MEDICARE

## 2024-10-23 ENCOUNTER — HOSPITAL ENCOUNTER (INPATIENT)
Age: 79
LOS: 2 days | Discharge: HOME | DRG: 305 | End: 2024-10-25
Attending: EMERGENCY MEDICINE | Admitting: INTERNAL MEDICINE
Payer: MEDICARE

## 2024-10-23 ENCOUNTER — APPOINTMENT (OUTPATIENT)
Dept: CT IMAGING | Age: 79
DRG: 305 | End: 2024-10-23
Payer: MEDICARE

## 2024-10-23 DIAGNOSIS — I16.0 HYPERTENSIVE URGENCY: Primary | ICD-10-CM

## 2024-10-23 DIAGNOSIS — R51.9 NONINTRACTABLE HEADACHE, UNSPECIFIED CHRONICITY PATTERN, UNSPECIFIED HEADACHE TYPE: ICD-10-CM

## 2024-10-23 DIAGNOSIS — M50.30 DDD (DEGENERATIVE DISC DISEASE), CERVICAL: ICD-10-CM

## 2024-10-23 DIAGNOSIS — I44.7 LEFT BUNDLE BRANCH BLOCK: ICD-10-CM

## 2024-10-23 PROBLEM — I10 UNCONTROLLED HYPERTENSION: Status: ACTIVE | Noted: 2024-10-23

## 2024-10-23 LAB
ALBUMIN SERPL-MCNC: 4.2 G/DL (ref 3.4–5)
ALBUMIN/GLOB SERPL: 1.5 {RATIO} (ref 1.1–2.2)
ALP SERPL-CCNC: 52 U/L (ref 40–129)
ALT SERPL-CCNC: 10 U/L (ref 10–40)
ANION GAP SERPL CALCULATED.3IONS-SCNC: 14 MMOL/L (ref 3–16)
AST SERPL-CCNC: 19 U/L (ref 15–37)
BASOPHILS # BLD: 0.1 K/UL (ref 0–0.2)
BASOPHILS NFR BLD: 1 %
BILIRUB SERPL-MCNC: 0.3 MG/DL (ref 0–1)
BUN SERPL-MCNC: 13 MG/DL (ref 7–20)
CALCIUM SERPL-MCNC: 9.8 MG/DL (ref 8.3–10.6)
CHLORIDE SERPL-SCNC: 101 MMOL/L (ref 99–110)
CO2 SERPL-SCNC: 24 MMOL/L (ref 21–32)
CREAT SERPL-MCNC: 0.7 MG/DL (ref 0.6–1.2)
DEPRECATED RDW RBC AUTO: 14.1 % (ref 12.4–15.4)
EKG ATRIAL RATE: 74 BPM
EKG DIAGNOSIS: NORMAL
EKG P AXIS: 40 DEGREES
EKG P-R INTERVAL: 162 MS
EKG Q-T INTERVAL: 454 MS
EKG QRS DURATION: 130 MS
EKG QTC CALCULATION (BAZETT): 503 MS
EKG R AXIS: 15 DEGREES
EKG T AXIS: 133 DEGREES
EKG VENTRICULAR RATE: 74 BPM
EOSINOPHIL # BLD: 0.3 K/UL (ref 0–0.6)
EOSINOPHIL NFR BLD: 4.2 %
GFR SERPLBLD CREATININE-BSD FMLA CKD-EPI: 89 ML/MIN/{1.73_M2}
GLUCOSE BLD-MCNC: 131 MG/DL (ref 70–99)
GLUCOSE BLD-MCNC: 194 MG/DL (ref 70–99)
GLUCOSE BLD-MCNC: 204 MG/DL (ref 70–99)
GLUCOSE SERPL-MCNC: 106 MG/DL (ref 70–99)
HCT VFR BLD AUTO: 40.2 % (ref 36–48)
HGB BLD-MCNC: 13.3 G/DL (ref 12–16)
LYMPHOCYTES # BLD: 1.6 K/UL (ref 1–5.1)
LYMPHOCYTES NFR BLD: 22.9 %
MCH RBC QN AUTO: 31.3 PG (ref 26–34)
MCHC RBC AUTO-ENTMCNC: 33.2 G/DL (ref 31–36)
MCV RBC AUTO: 94.2 FL (ref 80–100)
MONOCYTES # BLD: 0.6 K/UL (ref 0–1.3)
MONOCYTES NFR BLD: 9.3 %
NEUTROPHILS # BLD: 4.3 K/UL (ref 1.7–7.7)
NEUTROPHILS NFR BLD: 62.6 %
PERFORMED ON: ABNORMAL
PLATELET # BLD AUTO: 318 K/UL (ref 135–450)
PMV BLD AUTO: 8.4 FL (ref 5–10.5)
POTASSIUM SERPL-SCNC: 4.1 MMOL/L (ref 3.5–5.1)
PROT SERPL-MCNC: 7 G/DL (ref 6.4–8.2)
RBC # BLD AUTO: 4.26 M/UL (ref 4–5.2)
SODIUM SERPL-SCNC: 139 MMOL/L (ref 136–145)
TROPONIN, HIGH SENSITIVITY: 10 NG/L (ref 0–14)
TROPONIN, HIGH SENSITIVITY: 10 NG/L (ref 0–14)
WBC # BLD AUTO: 6.9 K/UL (ref 4–11)

## 2024-10-23 PROCEDURE — 80053 COMPREHEN METABOLIC PANEL: CPT

## 2024-10-23 PROCEDURE — 96374 THER/PROPH/DIAG INJ IV PUSH: CPT

## 2024-10-23 PROCEDURE — 6370000000 HC RX 637 (ALT 250 FOR IP): Performed by: PHYSICIAN ASSISTANT

## 2024-10-23 PROCEDURE — 2580000003 HC RX 258: Performed by: INTERNAL MEDICINE

## 2024-10-23 PROCEDURE — 93005 ELECTROCARDIOGRAM TRACING: CPT | Performed by: PHYSICIAN ASSISTANT

## 2024-10-23 PROCEDURE — 6360000002 HC RX W HCPCS: Performed by: NURSE PRACTITIONER

## 2024-10-23 PROCEDURE — 6370000000 HC RX 637 (ALT 250 FOR IP): Performed by: INTERNAL MEDICINE

## 2024-10-23 PROCEDURE — 84484 ASSAY OF TROPONIN QUANT: CPT

## 2024-10-23 PROCEDURE — 96375 TX/PRO/DX INJ NEW DRUG ADDON: CPT

## 2024-10-23 PROCEDURE — 85025 COMPLETE CBC W/AUTO DIFF WBC: CPT

## 2024-10-23 PROCEDURE — 2580000003 HC RX 258: Performed by: NURSE PRACTITIONER

## 2024-10-23 PROCEDURE — 70450 CT HEAD/BRAIN W/O DYE: CPT

## 2024-10-23 PROCEDURE — 6360000002 HC RX W HCPCS: Performed by: PHYSICIAN ASSISTANT

## 2024-10-23 PROCEDURE — 6360000002 HC RX W HCPCS: Performed by: EMERGENCY MEDICINE

## 2024-10-23 PROCEDURE — 99285 EMERGENCY DEPT VISIT HI MDM: CPT

## 2024-10-23 PROCEDURE — 36415 COLL VENOUS BLD VENIPUNCTURE: CPT

## 2024-10-23 PROCEDURE — 2060000000 HC ICU INTERMEDIATE R&B

## 2024-10-23 PROCEDURE — 71046 X-RAY EXAM CHEST 2 VIEWS: CPT

## 2024-10-23 PROCEDURE — 72125 CT NECK SPINE W/O DYE: CPT

## 2024-10-23 RX ORDER — BUTALBITAL, ASPIRIN, AND CAFFEINE 325; 50; 40 MG/1; MG/1; MG/1
1 CAPSULE ORAL EVERY 6 HOURS PRN
Status: DISCONTINUED | OUTPATIENT
Start: 2024-10-23 | End: 2024-10-23

## 2024-10-23 RX ORDER — KETOROLAC TROMETHAMINE 15 MG/ML
15 INJECTION, SOLUTION INTRAMUSCULAR; INTRAVENOUS ONCE
Status: COMPLETED | OUTPATIENT
Start: 2024-10-23 | End: 2024-10-23

## 2024-10-23 RX ORDER — POTASSIUM CHLORIDE 1500 MG/1
40 TABLET, EXTENDED RELEASE ORAL PRN
Status: DISCONTINUED | OUTPATIENT
Start: 2024-10-23 | End: 2024-10-25 | Stop reason: HOSPADM

## 2024-10-23 RX ORDER — OXYCODONE HYDROCHLORIDE 5 MG/1
2.5 TABLET ORAL EVERY 6 HOURS PRN
Status: DISCONTINUED | OUTPATIENT
Start: 2024-10-23 | End: 2024-10-25 | Stop reason: HOSPADM

## 2024-10-23 RX ORDER — ACETAMINOPHEN 500 MG
1000 TABLET ORAL ONCE
Status: COMPLETED | OUTPATIENT
Start: 2024-10-23 | End: 2024-10-23

## 2024-10-23 RX ORDER — ALPRAZOLAM 1 MG/1
1 TABLET ORAL 2 TIMES DAILY PRN
COMMUNITY
Start: 2024-10-16

## 2024-10-23 RX ORDER — ACETAMINOPHEN 325 MG/1
650 TABLET ORAL EVERY 6 HOURS PRN
Status: DISCONTINUED | OUTPATIENT
Start: 2024-10-23 | End: 2024-10-25 | Stop reason: HOSPADM

## 2024-10-23 RX ORDER — DIPHENHYDRAMINE HYDROCHLORIDE 50 MG/ML
12.5 INJECTION INTRAMUSCULAR; INTRAVENOUS ONCE
Status: COMPLETED | OUTPATIENT
Start: 2024-10-23 | End: 2024-10-23

## 2024-10-23 RX ORDER — CALCIUM CARBONATE 500(1250)
500 TABLET ORAL DAILY
Status: DISCONTINUED | OUTPATIENT
Start: 2024-10-24 | End: 2024-10-25 | Stop reason: HOSPADM

## 2024-10-23 RX ORDER — DULOXETIN HYDROCHLORIDE 60 MG/1
60 CAPSULE, DELAYED RELEASE ORAL 2 TIMES DAILY
Status: DISCONTINUED | OUTPATIENT
Start: 2024-10-23 | End: 2024-10-25 | Stop reason: HOSPADM

## 2024-10-23 RX ORDER — DEXTROSE MONOHYDRATE 100 MG/ML
INJECTION, SOLUTION INTRAVENOUS CONTINUOUS PRN
Status: DISCONTINUED | OUTPATIENT
Start: 2024-10-23 | End: 2024-10-25 | Stop reason: HOSPADM

## 2024-10-23 RX ORDER — LISINOPRIL 20 MG/1
20 TABLET ORAL DAILY
Status: DISCONTINUED | OUTPATIENT
Start: 2024-10-24 | End: 2024-10-25 | Stop reason: HOSPADM

## 2024-10-23 RX ORDER — MAGNESIUM SULFATE IN WATER 40 MG/ML
2000 INJECTION, SOLUTION INTRAVENOUS PRN
Status: DISCONTINUED | OUTPATIENT
Start: 2024-10-23 | End: 2024-10-25 | Stop reason: HOSPADM

## 2024-10-23 RX ORDER — INSULIN LISPRO 100 [IU]/ML
0-4 INJECTION, SOLUTION INTRAVENOUS; SUBCUTANEOUS
Status: DISCONTINUED | OUTPATIENT
Start: 2024-10-23 | End: 2024-10-25 | Stop reason: HOSPADM

## 2024-10-23 RX ORDER — ONDANSETRON 2 MG/ML
4 INJECTION INTRAMUSCULAR; INTRAVENOUS EVERY 6 HOURS PRN
Status: DISCONTINUED | OUTPATIENT
Start: 2024-10-23 | End: 2024-10-25 | Stop reason: HOSPADM

## 2024-10-23 RX ORDER — PANTOPRAZOLE SODIUM 40 MG/1
40 TABLET, DELAYED RELEASE ORAL DAILY
Status: DISCONTINUED | OUTPATIENT
Start: 2024-10-24 | End: 2024-10-25 | Stop reason: HOSPADM

## 2024-10-23 RX ORDER — GLUCAGON 1 MG/ML
1 KIT INJECTION PRN
Status: DISCONTINUED | OUTPATIENT
Start: 2024-10-23 | End: 2024-10-25 | Stop reason: HOSPADM

## 2024-10-23 RX ORDER — FUROSEMIDE 20 MG/1
20 TABLET ORAL DAILY PRN
Status: DISCONTINUED | OUTPATIENT
Start: 2024-10-23 | End: 2024-10-25 | Stop reason: HOSPADM

## 2024-10-23 RX ORDER — QUETIAPINE FUMARATE 100 MG/1
100 TABLET, FILM COATED ORAL NIGHTLY
Status: DISCONTINUED | OUTPATIENT
Start: 2024-10-23 | End: 2024-10-25 | Stop reason: HOSPADM

## 2024-10-23 RX ORDER — SODIUM CHLORIDE 9 MG/ML
INJECTION, SOLUTION INTRAVENOUS PRN
Status: DISCONTINUED | OUTPATIENT
Start: 2024-10-23 | End: 2024-10-25 | Stop reason: HOSPADM

## 2024-10-23 RX ORDER — SODIUM CHLORIDE 0.9 % (FLUSH) 0.9 %
5-40 SYRINGE (ML) INJECTION PRN
Status: DISCONTINUED | OUTPATIENT
Start: 2024-10-23 | End: 2024-10-25 | Stop reason: HOSPADM

## 2024-10-23 RX ORDER — POTASSIUM CHLORIDE 1500 MG/1
10 TABLET, EXTENDED RELEASE ORAL DAILY
Status: DISCONTINUED | OUTPATIENT
Start: 2024-10-24 | End: 2024-10-25 | Stop reason: HOSPADM

## 2024-10-23 RX ORDER — HYDRALAZINE HYDROCHLORIDE 20 MG/ML
10 INJECTION INTRAMUSCULAR; INTRAVENOUS EVERY 6 HOURS PRN
Status: DISCONTINUED | OUTPATIENT
Start: 2024-10-23 | End: 2024-10-25 | Stop reason: HOSPADM

## 2024-10-23 RX ORDER — METOCLOPRAMIDE HYDROCHLORIDE 5 MG/ML
10 INJECTION INTRAMUSCULAR; INTRAVENOUS ONCE
Status: COMPLETED | OUTPATIENT
Start: 2024-10-23 | End: 2024-10-23

## 2024-10-23 RX ORDER — METOPROLOL TARTRATE 50 MG
50 TABLET ORAL 2 TIMES DAILY
Status: DISCONTINUED | OUTPATIENT
Start: 2024-10-23 | End: 2024-10-25 | Stop reason: HOSPADM

## 2024-10-23 RX ORDER — ACETAMINOPHEN 650 MG/1
650 SUPPOSITORY RECTAL EVERY 6 HOURS PRN
Status: DISCONTINUED | OUTPATIENT
Start: 2024-10-23 | End: 2024-10-25 | Stop reason: HOSPADM

## 2024-10-23 RX ORDER — BUTALBITAL, ACETAMINOPHEN AND CAFFEINE 50; 325; 40 MG/1; MG/1; MG/1
1 TABLET ORAL EVERY 6 HOURS PRN
Status: DISCONTINUED | OUTPATIENT
Start: 2024-10-24 | End: 2024-10-25 | Stop reason: HOSPADM

## 2024-10-23 RX ORDER — CALCIUM/D3/MAG OX/COP/MANG/ZN 600 MG-20
1 TABLET,CHEWABLE ORAL DAILY
COMMUNITY

## 2024-10-23 RX ORDER — ONDANSETRON 4 MG/1
4 TABLET, ORALLY DISINTEGRATING ORAL EVERY 8 HOURS PRN
Status: DISCONTINUED | OUTPATIENT
Start: 2024-10-23 | End: 2024-10-25 | Stop reason: HOSPADM

## 2024-10-23 RX ORDER — ONDANSETRON 2 MG/ML
4 INJECTION INTRAMUSCULAR; INTRAVENOUS ONCE
Status: COMPLETED | OUTPATIENT
Start: 2024-10-23 | End: 2024-10-23

## 2024-10-23 RX ORDER — 0.9 % SODIUM CHLORIDE 0.9 %
1000 INTRAVENOUS SOLUTION INTRAVENOUS ONCE
Status: COMPLETED | OUTPATIENT
Start: 2024-10-23 | End: 2024-10-23

## 2024-10-23 RX ORDER — SODIUM CHLORIDE 9 MG/ML
INJECTION, SOLUTION INTRAVENOUS CONTINUOUS
Status: DISCONTINUED | OUTPATIENT
Start: 2024-10-23 | End: 2024-10-24

## 2024-10-23 RX ORDER — SODIUM CHLORIDE 0.9 % (FLUSH) 0.9 %
5-40 SYRINGE (ML) INJECTION EVERY 12 HOURS SCHEDULED
Status: DISCONTINUED | OUTPATIENT
Start: 2024-10-23 | End: 2024-10-25 | Stop reason: HOSPADM

## 2024-10-23 RX ORDER — POLYETHYLENE GLYCOL 3350 17 G/17G
17 POWDER, FOR SOLUTION ORAL DAILY PRN
Status: DISCONTINUED | OUTPATIENT
Start: 2024-10-23 | End: 2024-10-25 | Stop reason: HOSPADM

## 2024-10-23 RX ORDER — LABETALOL HYDROCHLORIDE 5 MG/ML
10 INJECTION, SOLUTION INTRAVENOUS EVERY 4 HOURS PRN
Status: DISCONTINUED | OUTPATIENT
Start: 2024-10-23 | End: 2024-10-25 | Stop reason: HOSPADM

## 2024-10-23 RX ORDER — LABETALOL HYDROCHLORIDE 5 MG/ML
10 INJECTION, SOLUTION INTRAVENOUS ONCE
Status: DISCONTINUED | OUTPATIENT
Start: 2024-10-23 | End: 2024-10-25 | Stop reason: HOSPADM

## 2024-10-23 RX ADMIN — METOPROLOL TARTRATE 50 MG: 50 TABLET, FILM COATED ORAL at 20:33

## 2024-10-23 RX ADMIN — KETOROLAC TROMETHAMINE 15 MG: 15 INJECTION, SOLUTION INTRAMUSCULAR; INTRAVENOUS at 22:28

## 2024-10-23 RX ADMIN — ONDANSETRON 4 MG: 2 INJECTION INTRAMUSCULAR; INTRAVENOUS at 16:00

## 2024-10-23 RX ADMIN — SODIUM CHLORIDE: 9 INJECTION, SOLUTION INTRAVENOUS at 23:38

## 2024-10-23 RX ADMIN — METOCLOPRAMIDE 10 MG: 5 INJECTION, SOLUTION INTRAMUSCULAR; INTRAVENOUS at 17:08

## 2024-10-23 RX ADMIN — SODIUM CHLORIDE, PRESERVATIVE FREE 10 ML: 5 INJECTION INTRAVENOUS at 20:35

## 2024-10-23 RX ADMIN — DULOXETINE HYDROCHLORIDE 60 MG: 60 CAPSULE, DELAYED RELEASE ORAL at 20:32

## 2024-10-23 RX ADMIN — SODIUM CHLORIDE 1000 ML: 9 INJECTION, SOLUTION INTRAVENOUS at 22:35

## 2024-10-23 RX ADMIN — ACETAMINOPHEN 1000 MG: 500 TABLET ORAL at 16:00

## 2024-10-23 RX ADMIN — QUETIAPINE FUMARATE 100 MG: 100 TABLET ORAL at 20:33

## 2024-10-23 RX ADMIN — APIXABAN 5 MG: 5 TABLET, FILM COATED ORAL at 20:33

## 2024-10-23 RX ADMIN — OXYCODONE HYDROCHLORIDE 2.5 MG: 5 TABLET ORAL at 19:31

## 2024-10-23 RX ADMIN — DIPHENHYDRAMINE HYDROCHLORIDE 12.5 MG: 50 INJECTION INTRAMUSCULAR; INTRAVENOUS at 17:07

## 2024-10-23 ASSESSMENT — PAIN SCALES - GENERAL
PAINLEVEL_OUTOF10: 10

## 2024-10-23 ASSESSMENT — PAIN DESCRIPTION - LOCATION
LOCATION: HEAD
LOCATION: HEAD

## 2024-10-23 ASSESSMENT — PAIN DESCRIPTION - DESCRIPTORS: DESCRIPTORS: ACHING

## 2024-10-23 NOTE — ED PROVIDER NOTES
Emergency Department Attending Provider Note  Location: [facility] EMERGENCY DEPARTMENT  10/23/2024   Note Started: 3:18 PM EDT 10/23/24     THIS IS MY ARIEL SUPERVISORY AND SHARED VISIT NOTE:    Patient Identification  Regi Roche is a 79 y.o. female      HPI:Regi Roche was evaluated in the Emergency Department for evaluation of a headache.  Patient states been having a migraine headache over the past 3 days.  No thunderclap onset.  States the headache is on top of her head.  States the pain is 9 out of 10 and nonradiating.  Occasion becomes sharp.  No exacerbating relieving factors.  No associated vision changes.  No neck pain or stiffness.  Patient states that she did fall out of a chair 5 days ago and did hit her head on the wall but had no loss of consciousness.  Patient does take Eliquis for history of CVA.  Has right-sided deficits from previous CVA which are unchanged from prior.  Patient denies any lightheadedness or dizziness prior to falling off the chair.  It was a mechanical fall.  Has had nausea without vomiting.  No chest pain or shortness of breath.  No focal numbness or weakness.  No vision changes.  No fevers.  Patient checked her blood pressure at home today and it was elevated which she thinks is may be contributing to her headache.  States she has been compliant with her antihypertensives, lisinopril and atenolol.  Although initial history and physical exam information was obtained by ARIEL/NPP (who also dictated a record of this visit), I personally saw the patient and made/approved the management plan and take responsibility for the patient management.       PHYSICAL EXAM:     CONSTITUTIONAL: AOx4, cooperative with exam, afebrile   HEAD: normocephalic, atraumatic   EYES: PERRL, EOMI, anicteric sclera but no raccoon eyes   ENT: Moist mucous membranes, uvula midline, no hemotympanum, no Pfeiffer sign   NECK: Supple, symmetric, trachea midline, no midline tenderness of the 
Mother     Cancer Father         SOCIAL HISTORY       Social History     Tobacco Use    Smoking status: Former    Smokeless tobacco: Never    Tobacco comments:     quit 30 yrs ago   Vaping Use    Vaping status: Never Used   Substance Use Topics    Alcohol use: No    Drug use: No       SCREENINGS        Pascale Coma Scale  Eye Opening: Spontaneous  Best Verbal Response: Oriented  Best Motor Response: Obeys commands  [place] Coma Scale Score: 15                CIWA Assessment  BP: (!) 168/98 (manual pressure)  Pulse: 76           PHYSICAL EXAM  1 or more Elements     ED Triage Vitals [10/23/24 1445]   BP Systolic BP Percentile Diastolic BP Percentile Temp Temp Source Pulse Respirations SpO2   (!) 198/94 -- -- 98.1 °F (36.7 °C) Oral 60 16 99 %      Height Weight - Scale         1.676 m (5' 6\") 81.9 kg (180 lb 8.9 oz)             Physical Exam  Constitutional:       General: She is not in acute distress.     Appearance: Normal appearance. She is well-developed. She is not ill-appearing, toxic-appearing or diaphoretic.   HENT:      Head: Normocephalic and atraumatic.   Eyes:      Conjunctiva/sclera: Conjunctivae normal.      Pupils: Pupils are equal, round, and reactive to light.   Neck:      Comments: No TTP cervical midline  Cardiovascular:      Rate and Rhythm: Normal rate and regular rhythm.      Heart sounds: Normal heart sounds.   Pulmonary:      Effort: Pulmonary effort is normal. No respiratory distress.      Breath sounds: Normal breath sounds.   Abdominal:      General: There is no distension.      Palpations: Abdomen is soft. There is no mass.      Tenderness: There is no abdominal tenderness. There is no guarding or rebound.      Hernia: No hernia is present.   Musculoskeletal:         General: Normal range of motion.      Cervical back: Normal range of motion and neck supple.   Skin:     General: Skin is warm.   Neurological:      Mental Status: She is alert.      Comments: Normal finger to nose and rapid

## 2024-10-23 NOTE — H&P
sliding scale diabetic diet history of seizure no further activities  Abnormal CT cervical spine, questionable mild cord effacement at C5-C6 patient does not have any neck pain at this time, MRI recommended as outpatient per radiology, discussed with patient and her daughter at bedside both verbalized understanding and willingness to follow-up  Has interstitial groundglass opacity patient does not have shortness of breath no fever chills or sign of pneumonia, per radiology suggest follow-up with serial chest x-ray as outpatient discussed with patient and daughter at bedside verbalized understanding and willingness to follow-up  On medication reviewed by myself  Goals of care full code  DVT Prophylaxis: Eliquis  Diet: No diet orders on file  Code Status: Prior    PT/OT Eval Status: Ordered    Dispo -inpatient 2 to 3 days       Shorty Fu MD    Thank you Moses Chery MD for the opportunity to be involved in this patient's care. If you have any questions or concerns please feel free to contact me at [phone].    Comment: Please note this report has been produced using speech recognition software and may contain errors related to that system including errors in grammar, punctuation, and spelling, as well as words and phrases that may be inappropriate. If there are any questions or concerns, please feel free to contact the dictating provider for clarification.

## 2024-10-24 LAB
ALBUMIN SERPL-MCNC: 3.6 G/DL (ref 3.4–5)
ALBUMIN/GLOB SERPL: 1.7 {RATIO} (ref 1.1–2.2)
ALP SERPL-CCNC: 46 U/L (ref 40–129)
ALT SERPL-CCNC: 7 U/L (ref 10–40)
ANION GAP SERPL CALCULATED.3IONS-SCNC: 12 MMOL/L (ref 3–16)
AST SERPL-CCNC: 14 U/L (ref 15–37)
BASOPHILS # BLD: 0.1 K/UL (ref 0–0.2)
BASOPHILS NFR BLD: 0.6 %
BILIRUB SERPL-MCNC: 0.4 MG/DL (ref 0–1)
BUN SERPL-MCNC: 16 MG/DL (ref 7–20)
CALCIUM SERPL-MCNC: 9.1 MG/DL (ref 8.3–10.6)
CHLORIDE SERPL-SCNC: 100 MMOL/L (ref 99–110)
CO2 SERPL-SCNC: 24 MMOL/L (ref 21–32)
CREAT SERPL-MCNC: 0.9 MG/DL (ref 0.6–1.2)
DEPRECATED RDW RBC AUTO: 14.1 % (ref 12.4–15.4)
EOSINOPHIL # BLD: 0.1 K/UL (ref 0–0.6)
EOSINOPHIL NFR BLD: 1 %
EST. AVERAGE GLUCOSE BLD GHB EST-MCNC: 137 MG/DL
GFR SERPLBLD CREATININE-BSD FMLA CKD-EPI: 66 ML/MIN/{1.73_M2}
GLUCOSE BLD-MCNC: 111 MG/DL (ref 70–99)
GLUCOSE BLD-MCNC: 120 MG/DL (ref 70–99)
GLUCOSE BLD-MCNC: 129 MG/DL (ref 70–99)
GLUCOSE BLD-MCNC: 160 MG/DL (ref 70–99)
GLUCOSE SERPL-MCNC: 122 MG/DL (ref 70–99)
HBA1C MFR BLD: 6.4 %
HCT VFR BLD AUTO: 35.2 % (ref 36–48)
HGB BLD-MCNC: 12.1 G/DL (ref 12–16)
LYMPHOCYTES # BLD: 1.8 K/UL (ref 1–5.1)
LYMPHOCYTES NFR BLD: 20.3 %
MCH RBC QN AUTO: 31.9 PG (ref 26–34)
MCHC RBC AUTO-ENTMCNC: 34.3 G/DL (ref 31–36)
MCV RBC AUTO: 92.9 FL (ref 80–100)
MONOCYTES # BLD: 0.9 K/UL (ref 0–1.3)
MONOCYTES NFR BLD: 10.2 %
NEUTROPHILS # BLD: 5.9 K/UL (ref 1.7–7.7)
NEUTROPHILS NFR BLD: 67.9 %
PERFORMED ON: ABNORMAL
PLATELET # BLD AUTO: 285 K/UL (ref 135–450)
PMV BLD AUTO: 8.3 FL (ref 5–10.5)
POTASSIUM SERPL-SCNC: 3.9 MMOL/L (ref 3.5–5.1)
PROT SERPL-MCNC: 5.7 G/DL (ref 6.4–8.2)
RBC # BLD AUTO: 3.79 M/UL (ref 4–5.2)
SODIUM SERPL-SCNC: 136 MMOL/L (ref 136–145)
WBC # BLD AUTO: 8.7 K/UL (ref 4–11)

## 2024-10-24 PROCEDURE — 2060000000 HC ICU INTERMEDIATE R&B

## 2024-10-24 PROCEDURE — 97166 OT EVAL MOD COMPLEX 45 MIN: CPT

## 2024-10-24 PROCEDURE — 97530 THERAPEUTIC ACTIVITIES: CPT

## 2024-10-24 PROCEDURE — 2580000003 HC RX 258: Performed by: INTERNAL MEDICINE

## 2024-10-24 PROCEDURE — 36415 COLL VENOUS BLD VENIPUNCTURE: CPT

## 2024-10-24 PROCEDURE — 83036 HEMOGLOBIN GLYCOSYLATED A1C: CPT

## 2024-10-24 PROCEDURE — 99222 1ST HOSP IP/OBS MODERATE 55: CPT | Performed by: INTERNAL MEDICINE

## 2024-10-24 PROCEDURE — 97161 PT EVAL LOW COMPLEX 20 MIN: CPT

## 2024-10-24 PROCEDURE — 85025 COMPLETE CBC W/AUTO DIFF WBC: CPT

## 2024-10-24 PROCEDURE — 6370000000 HC RX 637 (ALT 250 FOR IP): Performed by: INTERNAL MEDICINE

## 2024-10-24 PROCEDURE — 80053 COMPREHEN METABOLIC PANEL: CPT

## 2024-10-24 RX ORDER — ALPRAZOLAM 0.5 MG
1 TABLET ORAL 2 TIMES DAILY PRN
Status: DISCONTINUED | OUTPATIENT
Start: 2024-10-24 | End: 2024-10-25 | Stop reason: HOSPADM

## 2024-10-24 RX ADMIN — DULOXETINE HYDROCHLORIDE 60 MG: 60 CAPSULE, DELAYED RELEASE ORAL at 09:30

## 2024-10-24 RX ADMIN — APIXABAN 5 MG: 5 TABLET, FILM COATED ORAL at 19:50

## 2024-10-24 RX ADMIN — SODIUM CHLORIDE, PRESERVATIVE FREE 10 ML: 5 INJECTION INTRAVENOUS at 19:51

## 2024-10-24 RX ADMIN — LISINOPRIL 20 MG: 20 TABLET ORAL at 18:10

## 2024-10-24 RX ADMIN — CALCIUM 500 MG: 500 TABLET ORAL at 09:31

## 2024-10-24 RX ADMIN — APIXABAN 5 MG: 5 TABLET, FILM COATED ORAL at 09:30

## 2024-10-24 RX ADMIN — ALPRAZOLAM 1 MG: 0.5 TABLET ORAL at 18:29

## 2024-10-24 RX ADMIN — POTASSIUM CHLORIDE 10 MEQ: 1500 TABLET, EXTENDED RELEASE ORAL at 09:31

## 2024-10-24 RX ADMIN — METOPROLOL TARTRATE 50 MG: 50 TABLET, FILM COATED ORAL at 19:49

## 2024-10-24 RX ADMIN — PANTOPRAZOLE SODIUM 40 MG: 40 TABLET, DELAYED RELEASE ORAL at 09:31

## 2024-10-24 RX ADMIN — QUETIAPINE FUMARATE 100 MG: 100 TABLET ORAL at 19:49

## 2024-10-24 RX ADMIN — DULOXETINE HYDROCHLORIDE 60 MG: 60 CAPSULE, DELAYED RELEASE ORAL at 19:50

## 2024-10-24 ASSESSMENT — PAIN SCALES - GENERAL
PAINLEVEL_OUTOF10: 0

## 2024-10-24 NOTE — PLAN OF CARE
Problem: Chronic Conditions and Co-morbidities  Goal: Patient's chronic conditions and co-morbidity symptoms are monitored and maintained or improved  Outcome: Progressing  Flowsheets (Taken 10/24/2024 0341)  Care Plan - Patient's Chronic Conditions and Co-Morbidity Symptoms are Monitored and Maintained or Improved:   Monitor and assess patient's chronic conditions and comorbid symptoms for stability, deterioration, or improvement   Collaborate with multidisciplinary team to address chronic and comorbid conditions and prevent exacerbation or deterioration   Update acute care plan with appropriate goals if chronic or comorbid symptoms are exacerbated and prevent overall improvement and discharge     Problem: Discharge Planning  Goal: Discharge to home or other facility with appropriate resources  Outcome: Progressing  Flowsheets (Taken 10/24/2024 0341)  Discharge to home or other facility with appropriate resources:   Identify barriers to discharge with patient and caregiver   Identify discharge learning needs (meds, wound care, etc)   Arrange for needed discharge resources and transportation as appropriate   Refer to discharge planning if patient needs post-hospital services based on physician order or complex needs related to functional status, cognitive ability or social support system     Problem: ABCDS Injury Assessment  Goal: Absence of physical injury  Outcome: Progressing  Flowsheets (Taken 10/24/2024 0341)  Absence of Physical Injury: Implement safety measures based on patient assessment     Problem: Safety - Adult  Goal: Free from fall injury  Outcome: Progressing  Flowsheets (Taken 10/24/2024 0341)  Free From Fall Injury:   Instruct family/caregiver on patient safety   Based on caregiver fall risk screen, instruct family/caregiver to ask for assistance with transferring infant if caregiver noted to have fall risk factors

## 2024-10-24 NOTE — CONSULTS
Interventional Cardiology Consultation     Regi Roche  1945    PCP: Moses Chery MD  Referring Physician: Dr. Fu   Reason for Referral: abnormal EKG  Chief Complaint: \"I had a headache\"  Chief Complaint   Patient presents with    Headache     Hx of migraine headache x 3 days , 9/10 pain.     Hypertension     Htn bp for ems 202/101. Reports being compliant with meds.     Fall     Fall on Saturday, states she fell off of chair, + head inj, no loc.    Nausea       Subjective:   History of Present Illness: The patient is 79 y.o. female with a past medical history significant for HLD, HTN, DM type 2, pAF s/p ablation 8/2021, and ILR in place. Patient underwent watchman procedure 2/2022 that was unsuccessful due to short ERIN. Patient previously followed with [facility] for primary cardiology. Patient presented to  10/23 with complaints of worsening headaches. /94. EKG in the ED showed LBBB which prompted consult to cardiology.  Rapid response was called overnight due to hypotension, IV fluids started. Upon assessment, patient resting in bed comfortably with family at bedside. Currently on 3L O2 NC. Does not wear oxygen at home. States she has been wearing since rapid response. Patient states she does not remember coming to the ED. States she has a history of headaches, however notes most recent headache to be \"one of the worst\" which prompted visit to ED. Patient denies any chest pain, shortness of breath, or palpitations. States she has been more unsteady on her feet recently, endorses multiple falls. States she had a fall last Wednesday where she did hit her head. Did seek medical attention at [facility], has upcoming MRI of the head.  Patient also endorses known history of LBBB, first diagnosed in 2015 following a back surgery. Has been asymptomatic.           Past Medical History:   Diagnosis Date    Arthritis     Back pain     Cancer (HCC)     breast, Left

## 2024-10-24 NOTE — CARE COORDINATION
Case Management Assessment  Initial Evaluation    Date/Time of Evaluation: 10/24/2024 9:44 AM  Assessment Completed by: Shelly Jenkins RN    If patient is discharged prior to next notation, then this note serves as note for discharge by case management.    Patient Name: Regi Roche                   YOB: 1945  Diagnosis: Left bundle branch block [I44.7]  DDD (degenerative disc disease), cervical [M50.30]  Hypertensive urgency [I16.0]  Uncontrolled hypertension [I10]  Nonintractable headache, unspecified chronicity pattern, unspecified headache type [R51.9]                   Date / Time: 10/23/2024  2:43 PM    Patient Admission Status: Inpatient   Readmission Risk (Low < 19, Mod (19-27), High > 27): Readmission Risk Score: 10.5    Current PCP: Moses Chery MD  PCP verified by CM? Yes    Chart Reviewed: Yes      History Provided by: Patient  Patient Orientation: Alert and Oriented    Patient Cognition: Alert    Hospitalization in the last 30 days (Readmission):  No    If yes, Readmission Assessment in CM Navigator will be completed.    Advance Directives:      Code Status: Full Code   Patient's Primary Decision Maker is: Legal Next of Kin    Primary Decision Maker: WojciechSaqib - Child - [phone]    Secondary Decision Maker: Renita Jeffrey - Child - [phone]    Discharge Planning:    Patient lives with: Children (daughter) Type of Home: Other (Comment) (condo 1 level - 1 step)  Primary Care Giver: Self  Patient Support Systems include: Children, Family Members   Current Financial resources: Medicare  Current community resources: None  Current services prior to admission: Durable Medical Equipment            Current DME: Walker, Glucometer (has a Rollator)            Type of Home Care services:  None    ADLS  Prior functional level: Independent in ADLs/IADLs  Current functional level: Assistance with the following:, Mobility    PT AM-PAC:   /24  OT AM-PAC:   /24    Family can

## 2024-10-24 NOTE — ACP (ADVANCE CARE PLANNING)
Advance Care Planning     Advance Care Planning Activator (Inpatient)  Conversation Note      Date of ACP Conversation: 10/24/2024     Conversation Conducted with: Patient with Decision Making Capacity    ACP Activator: Shelly Jenkins RN    Health Care Decision Maker:     Current Designated Health Care Decision Maker:     Primary Decision Maker: Saqib Jeffrey - Child - [phone]    Secondary Decision Maker: Renita Jeffrey - Child - [phone]    Today we documented Decision Maker(s) consistent with Legal Next of Kin hierarchy.    Care Preferences    Ventilation:  \"If you were in your present state of health and suddenly became very ill and were unable to breathe on your own, what would your preference be about the use of a ventilator (breathing machine) if it were available to you?\"      Would the patient desire the use of ventilator (breathing machine)?: yes    \"If your health worsens and it becomes clear that your chance of recovery is unlikely, what would your preference be about the use of a ventilator (breathing machine) if it were available to you?\"     Would the patient desire the use of ventilator (breathing machine)?: No      Resuscitation  \"CPR works best to restart the heart when there is a sudden event, like a heart attack, in someone who is otherwise healthy. Unfortunately, CPR does not typically restart the heart for people who have serious health conditions or who are very sick.\"    \"In the event your heart stopped as a result of an underlying serious health condition, would you want attempts to be made to restart your heart (answer \"yes\" for attempt to resuscitate) or would you prefer a natural death (answer \"no\" for do not attempt to resuscitate)?\" yes       [] Yes   [x] No   Educated Patient / Decision Maker regarding differences between Advance Directives and portable DNR orders.    Length of ACP Conversation in minutes:  5 minutes    Conversation Outcomes:  ACP discussion

## 2024-10-25 VITALS
BODY MASS INDEX: 27.35 KG/M2 | DIASTOLIC BLOOD PRESSURE: 76 MMHG | HEART RATE: 82 BPM | TEMPERATURE: 98 F | OXYGEN SATURATION: 98 % | WEIGHT: 170.19 LBS | RESPIRATION RATE: 25 BRPM | SYSTOLIC BLOOD PRESSURE: 146 MMHG | HEIGHT: 66 IN

## 2024-10-25 LAB
GLUCOSE BLD-MCNC: 100 MG/DL (ref 70–99)
GLUCOSE BLD-MCNC: 144 MG/DL (ref 70–99)
PERFORMED ON: ABNORMAL
PERFORMED ON: ABNORMAL

## 2024-10-25 PROCEDURE — 6370000000 HC RX 637 (ALT 250 FOR IP): Performed by: INTERNAL MEDICINE

## 2024-10-25 PROCEDURE — 2700000000 HC OXYGEN THERAPY PER DAY

## 2024-10-25 PROCEDURE — 94761 N-INVAS EAR/PLS OXIMETRY MLT: CPT

## 2024-10-25 PROCEDURE — 97530 THERAPEUTIC ACTIVITIES: CPT

## 2024-10-25 PROCEDURE — 97110 THERAPEUTIC EXERCISES: CPT

## 2024-10-25 PROCEDURE — 2580000003 HC RX 258: Performed by: INTERNAL MEDICINE

## 2024-10-25 RX ORDER — METOPROLOL TARTRATE 50 MG
50 TABLET ORAL 2 TIMES DAILY
Qty: 60 TABLET | Refills: 0 | Status: SHIPPED | OUTPATIENT
Start: 2024-10-25

## 2024-10-25 RX ADMIN — SODIUM CHLORIDE, PRESERVATIVE FREE 10 ML: 5 INJECTION INTRAVENOUS at 08:52

## 2024-10-25 RX ADMIN — POTASSIUM CHLORIDE 10 MEQ: 1500 TABLET, EXTENDED RELEASE ORAL at 08:52

## 2024-10-25 RX ADMIN — ALPRAZOLAM 1 MG: 0.5 TABLET ORAL at 13:25

## 2024-10-25 RX ADMIN — DULOXETINE HYDROCHLORIDE 60 MG: 60 CAPSULE, DELAYED RELEASE ORAL at 08:52

## 2024-10-25 RX ADMIN — APIXABAN 5 MG: 5 TABLET, FILM COATED ORAL at 08:52

## 2024-10-25 RX ADMIN — PANTOPRAZOLE SODIUM 40 MG: 40 TABLET, DELAYED RELEASE ORAL at 08:52

## 2024-10-25 RX ADMIN — METOPROLOL TARTRATE 50 MG: 50 TABLET, FILM COATED ORAL at 08:52

## 2024-10-25 RX ADMIN — CALCIUM 500 MG: 500 TABLET ORAL at 08:52

## 2024-10-25 RX ADMIN — LISINOPRIL 20 MG: 20 TABLET ORAL at 08:52

## 2024-10-25 NOTE — DISCHARGE SUMMARY
Hospital Medicine Discharge Summary    Patient ID: Regi Roche      Patient's PCP: Moses Chery MD    Admit Date: 10/23/2024     Discharge Date:   10/25/2024    Admitting Provider: Shorty Fu MD     Discharge Provider: Shorty Fu MD     Discharge Diagnoses:       Active Hospital Problems    Diagnosis     Uncontrolled hypertension [I10]        The patient was seen and examined on day of discharge and this discharge summary is in conjunction with any daily progress note from day of discharge.    Hospital Course:       From HPI:\"79 y.o. female who presented to [facility] with headache for 2 to 3 days, to note the patient had a history of migraine, but at the same time she had a mechanical fall 5 days ago and stated that she hit her head without loss of consciousness, denies any chest pain shortness of breath nausea vomiting or abdominal pain in emergency department EKG abnormal with possible left bundle branch block troponin negative patient does not have any chest pain, currently systolic blood pressure 180, patient on beta-blocker and ACE at home and she stated that she has been compliant with medication patient being admitted for further management and treatment discussed with ED provider agree with plan as outlined below \"         Hypertensive urgency, likely triggered by headache overall significantly better controlled atenolol changed to metoprolol during the admission stay tolerating well will discharge on this and follow-up with PCP for further management if needed to get back to atenolol I will defer that to her primary care physician   Headache, symptomatic management resolved  Episodes of diminished responsiveness rapid response called resolved due to hypotension  Abnormal EKG with possible left bundle branch block cardiology consulted on admission  Diabetes mellitus resume home regimen  History of seizure no further activities  Abnormal CT cervical spine, questionable

## 2024-10-25 NOTE — PLAN OF CARE
Problem: Chronic Conditions and Co-morbidities  Goal: Patient's chronic conditions and co-morbidity symptoms are monitored and maintained or improved  10/25/2024 0959 by Jacob Hansen RN  Outcome: Progressing  Flowsheets (Taken 10/25/2024 0855)  Care Plan - Patient's Chronic Conditions and Co-Morbidity Symptoms are Monitored and Maintained or Improved: Monitor and assess patient's chronic conditions and comorbid symptoms for stability, deterioration, or improvement     Problem: Discharge Planning  Goal: Discharge to home or other facility with appropriate resources  10/25/2024 0959 by Jacob Hansen RN  Outcome: Progressing  Flowsheets (Taken 10/25/2024 0855)  Discharge to home or other facility with appropriate resources: Identify barriers to discharge with patient and caregiver     Problem: ABCDS Injury Assessment  Goal: Absence of physical injury  10/25/2024 0959 by Jacob Hansen RN  Outcome: Progressing     Problem: Safety - Adult  Goal: Free from fall injury  10/25/2024 0959 by Jacob Hansen RN  Outcome: Progressing

## 2024-10-25 NOTE — CARE COORDINATION
Case Management Discharge Note          Date / Time of Note: 10/25/2024 1:20 PM                  Patient Name: Regi Roche   YOB: 1945  Diagnosis: Left bundle branch block [I44.7]  DDD (degenerative disc disease), cervical [M50.30]  Hypertensive urgency [I16.0]  Uncontrolled hypertension [I10]  Nonintractable headache, unspecified chronicity pattern, unspecified headache type [R51.9]   Date / Time: 10/23/2024  2:43 PM    Financial:  Payor: HU[employer] MEDICARE / Plan: HU[employer] GOLD PLUS HMO / Product Type: *No Product type* /      Pharmacy:    [facility] - [place] - [phone] - F [phone]  [address] [place] [address]  Phone: [phone] Fax: [phone]    [facility] [id number] - [place], [place] - [address] - [place] [phone] -  [phone]  [address]  [place] [address]  Phone: [phone] Fax: [phone]      Assistance purchasing medications?: Potential Assistance Purchasing Medications: No  Assistance provided by Case Management: None at this time    DISCHARGE Disposition: Home- No Services Needed    Transportation:  Transportation PLAN for discharge: family   Mode of Transport: Private Car  Time of Transport: later today    Transport form completed: Not Indicated    IMM Completed:   Given on admit    Additional CM Notes: Patient will return home. Declines needing home care. Patient says her family will transport her home.    The Plan for Transition of Care is related to the following treatment goals of Left bundle branch block [I44.7]  DDD (degenerative disc disease), cervical [M50.30]  Hypertensive urgency [I16.0]  Uncontrolled hypertension [I10]  Nonintractable headache, unspecified chronicity pattern, unspecified headache type [R51.9]    Shelly Jenkins RN  [facility]   Case Management Department  Ph: [phone]

## 2024-10-25 NOTE — PROGRESS NOTES
Call initiated by: Nursing staff:  Rapid Response  Call addressed around: 10/23/2024 10:43 PM      Reason for call: Rapid response called for decreased responsiveness and sbp IN 70S. She was hypertensive in the ED requiring antihypertensives and home medication changes. On arrival, patient in bed, on 5L O2 via NC, alert and oriented to person, place and situation but required cuing for date. She reports severe headache which she states that it is unchanged from what she presented with. She denies cp, lightheadedness and dizziness reports photophobia. SBP in 70s.  No neuro deficit on exam.       Orders placed: Given bolus fluids and SBP improved to high 90s. She is back to her baseline. Continue current poc pending attending re-evaluation in am.        EDDA Stokes - CNP    
4 Eyes Skin Assessment     NAME:  Regi Roche  YOB: 1945  MEDICAL RECORD NUMBER:  [id number]    The patient is being assessed for  Admission    I agree that at least one RN has performed a thorough Head to Toe Skin Assessment on the patient. ALL assessment sites listed below have been assessed.      Areas assessed by both nurses:    Head, Face, Ears, Shoulders, Back, Chest, Arms, Elbows, Hands, Sacrum. Buttock, Coccyx, Ischium, and Legs. Feet and Heels        Does the Patient have a Wound? No noted wound(s)       Александр Prevention initiated by RN: No  Wound Care Orders initiated by RN: No    Pressure Injury (Stage 3,4, Unstageable, DTI, NWPT, and Complex wounds) if present, place Wound referral order by RN under : No    New Ostomies, if present place, Ostomy referral order under : No     Nurse 1 eSignature: Electronically signed by Beth Ashley RN on 10/24/24 at 6:05 AM EDT    **SHARE this note so that the co-signing nurse can place an eSignature**    Nurse 2 eSignature: Electronically signed by Sarah Osei RN on 10/24/24 at 6:05 AM EDT   
Discharge orders acknowledged by RN . Discharge teaching completed with pt. AVS reviewed and all questions answered. Medication regimen reviewed and pt understands schedule. Patient is to stop taking atenolol and start with metoprolol. Follow up appointments also reviewed with pt and resources given for discharge. Patient is to follow up with PCP and get a repeat chest x ray and also get a MRI as an outpatient.  IV removed. Bedside monitor removed from pt.  Required core measures completed. Pt vitals WDL. Pt discharged with all belongings to home with daughter . Pt transported off of unit via wheelchair. No complications.     Electronically signed by Jacob Hansen RN on 10/25/2024 at 3:56 PM     
Medication Reconciliation    List of medications patient is currently taking is complete.     Source of information: 1. Conversation with patient & family at bedside                                      2. EPIC records      Allergies  Lexapro [escitalopram oxalate] and Wellbutrin [bupropion]     Notes regarding home medications:     1. Patient received all of her AM home medications prior to arrival to the emergency department.    2. Patient states she has not taken her Fosamax in a couple of weeks due to being out of it and no refills were remaining on home prescription.       Irene Sewell, Pharmacy Intern  10/23/2024 7:16 PM                
Occupational Therapy  Facility/Department: [address] PROGRESSIVE CARE  Occupational Therapy Initial Assessment    Name: Regi Roche  : 1945  MRN: [id number]  Date of Service: 10/24/2024    Discharge Recommendations:  2-3 sessions per week, Patient would benefit from continued therapy after discharge, Home with assist PRN     Regi Roche scored a 19/24 on the AM-PAC ADL Inpatient form. Current research shows that an AM-PAC score of 18 or greater is typically associated with a discharge to the patient's home setting. Based on the patient's AM-PAC score, and their current ADL deficits, it is recommended that the patient have 2-3 sessions per week of Occupational Therapy at d/c to increase the patient's independence.  At this time, this patient demonstrates the endurance and safety to discharge home with home OT (home vs OP services) and a follow up treatment frequency of 2-3x/wk.   Please see assessment section for further patient specific details.    If patient discharges prior to next session this note will serve as a discharge summary.  Please see below for the latest assessment towards goals.       Patient Diagnosis(es): The primary encounter diagnosis was Hypertensive urgency. Diagnoses of Left bundle branch block, Nonintractable headache, unspecified chronicity pattern, unspecified headache type, and DDD (degenerative disc disease), cervical were also pertinent to this visit.  Past Medical History:  has a past medical history of Arthritis, Back pain, Cancer (HCC), Cerebral artery occlusion with cerebral infarction (HCC), Diabetes, High blood pressure, Hyperlipidemia, Seizure (HCC), SVT (supraventricular tachycardia), and Type II or unspecified type diabetes mellitus without mention of complication, not stated as uncontrolled.  Past Surgical History:  has a past surgical history that includes back surgery; Carpal tunnel release; Hysterectomy; Tonsillectomy; lymph node biopsy (Left); Colonoscopy; 
Occupational Therapy  Unable to see pt at this time due to pt declining OT treatment with pt requesting to eat lunch. Pt reports she is going home this date. No concerns per pt. Will follow up with pt as time allows. Continue with POC.    Abran Salinas OTR/L     
Patient arrived to the unit around 2000. Patient was alert and oriented, able to make needs known. Patient was connected to bedside telemetry monitor. Patient's BP was elevated. Scheduled night medications (Eliquis, Cymbalta, Metoprolol, and Seroquel) were given. Patient with complaints of a headache. Patient was set up for dinner and ate. Patient oriented to the room and use of call light.    Around 2200 patient's oxygen saturation fell below 90%. Oxygen was placed on patient. Patient noted to be very lethargic. BP now very low. Rapid Response was initiated. MD and NP to bedside. Orders were placed for a NaCl Bolus. This was carried out per order. During rapid response patient became more alert, complaining of a severe headache. NP gave orders for Toradol, this was given per order. BP slowly improving. This RN contacted the patient's daughter Renita and updated her on patient's condition and plan of care. Daughter appreciative of update.     Patient is now resting in bed at this time. Continuous telemetry and oxygen monitoring in place. Monitoring BP closely. Call light within reach.     Electronically signed by Sarah Osei RN on 10/23/2024 at 11:09 PM     
Patient's O2 weaned off to RA. Patient's O2 saturation remains above 90%.    Electronically signed by Jacob Hansen RN on 10/25/2024 at 11:32 AM    
Physical Therapy  Facility/Department: [address] PROGRESSIVE CARE  Physical Therapy Initial Assessment    Name: Regi Roche  : 1945  MRN: [id number]  Date of Service: 10/24/2024    Discharge Recommendations:  Home with Home health PT, Patient would benefit from continued therapy after discharge, Home with assist PRN   PT Equipment Recommendations  Equipment Needed: No      Patient Diagnosis(es): The primary encounter diagnosis was Hypertensive urgency. Diagnoses of Left bundle branch block, Nonintractable headache, unspecified chronicity pattern, unspecified headache type, and DDD (degenerative disc disease), cervical were also pertinent to this visit.  Past Medical History:  has a past medical history of Arthritis, Back pain, Cancer (HCC), Cerebral artery occlusion with cerebral infarction (HCC), Diabetes, High blood pressure, Hyperlipidemia, Seizure (HCC), SVT (supraventricular tachycardia), and Type II or unspecified type diabetes mellitus without mention of complication, not stated as uncontrolled.  Past Surgical History:  has a past surgical history that includes back surgery; Carpal tunnel release; Hysterectomy; Tonsillectomy; lymph node biopsy (Left); Colonoscopy; Intracapsular cataract extraction (Right, 3/11/2019); Breast lumpectomy (Left); Intracapsular cataract extraction (Left, 3/25/2019); and Hip fracture surgery (Right, 3/18/2020).    Assessment  Body Structures, Functions, Activity Limitations Requiring Skilled Therapeutic Intervention: Decreased functional mobility ;Decreased balance  Assessment: Pt is a 79 y.o. F. admitted 10/23 for hypertensive urgency.  PMH includes multiple CVAs.  She c/o chronic L-sided weakness, and tremors/twitching when she gets nervous.  Today, she demonstrated functional LE strength, and was able to ambulate short distances in room with RW, CGA.  She did have several minor episodes of twitching, but was able to maintain balance throughout.  Pt would benefit from 
Transfer training;Gait training;ADL/Self-care training;Stair training;Neuromuscular re-education;Safety education & training;Patient/Caregiver education & training;Equipment evaluation, education, & procurement;Positioning;Home exercise program;Balance training;Endurance training;Functional mobility training    Restrictions  Restrictions/Precautions  Restrictions/Precautions: Fall Risk  Position Activity Restriction  Other position/activity restrictions: 1L O2 via NC     Subjective   Subjective  Subjective: Pt agreeable to activity.  Orientation  Overall Orientation Status: Within Functional Limits    Objective    Transfer Training  Transfer Training: Yes  Overall Level of Assistance: Stand-by assistance  Sit to Stand: Stand-by assistance  Stand to Sit: Stand-by assistance    Gait  Gait Training: Yes  Overall Level of Assistance: Stand-by assistance  Distance (ft): 150 Feet  Assistive Device: Walker, rolling  Base of Support: Widened  Speed/Karen: Slow;Shuffled     PT Exercises  Exercise Treatment: Standing: heel raise x 30, march x 30, squat x 30, shoulder Flex x 30, Elbow Flex x 30.     Safety Devices  Type of Devices: All fall risk precautions in place;Call light within reach;Chair alarm in place;Left in chair  Restraints  Restraints Initially in Place: No    Goals  Short Term Goals  Time Frame for Short Term Goals: 2-3 days  Short Term Goal 1: Bed mobility (I)  Short Term Goal 2: Transfers (I)  Short Term Goal 3: Ambulation 50' with RW (I)  Short Term Goal 4: Ascend/Descend 1 step (I)  Patient Goals   Patient Goals : To go home    Education  Patient Education  Education Given To: Patient  Education Provided: Role of Therapy;Plan of Care  Education Method: Demonstration;Verbal  Education Outcome: Continued education needed    AM-PAC - Mobility    AM-PAC Basic Mobility - Inpatient   How much help is needed turning from your back to your side while in a flat bed without using bedrails?: A Little  How much help is 
the mA/kV was utilized to reduce the radiation dose to as low as reasonably achievable. COMPARISON: 10/05/2023 HISTORY: ORDERING SYSTEM PROVIDED HISTORY: fall, head injry, headache TECHNOLOGIST PROVIDED HISTORY: Has a \"code stroke\" or \"stroke alert\" been called?->No Reason for exam:->fall, head injry, headache Decision Support Exception - unselect if not a suspected or confirmed emergency medical condition->Emergency Medical Condition (MA) Reason for Exam: fall, head injry, headache FINDINGS: BRAIN/VENTRICLES: The ventricles are mildly enlarged and there is diffuse mild prominence of the cortical sulci.  There is moderate periventricular low density bilaterally with small old lacunar infarcts scattered along the basal ganglia bilaterally which is unchanged.  No intracranial hemorrhage or edema is seen.  There is no extra-axial fluid collection or mass. ORBITS: The visualized portion of the orbits demonstrate no acute abnormality. SINUSES: There is mild mucosal thickening throughout the ethmoid and left maxillary sinuses.  There are no air-fluid levels.  The mastoid air cells demonstrate no acute abnormality. SOFT TISSUES/SKULL:  No acute abnormality of the visualized skull or soft tissues.     Mild atrophy and moderate chronic microischemic changes scattered in the deep white matter no acute intracranial abnormality seen Mild chronic sinusitis.     XR CHEST (2 VW)    Result Date: 10/23/2024  EXAMINATION: TWO XRAY VIEWS OF THE CHEST 10/23/2024 3:21 pm COMPARISON: None. HISTORY: ORDERING SYSTEM PROVIDED HISTORY: HTN TECHNOLOGIST PROVIDED HISTORY: Reason for exam:->HTN Reason for Exam: headache FINDINGS: The lungs appear clear. The heart and mediastinal structures are unremarkable. Bony thorax appears normal. Visualized upper abdomen is unremarkable.     No radiographic evidence of acute cardiopulmonary process.     EEG AWAKE AND ASLEEP    Result Date: 10/22/2024  REFERRING MD: Moses Chery MD REASON FOR EE yo

## 2024-10-25 NOTE — PLAN OF CARE
Problem: Chronic Conditions and Co-morbidities  Goal: Patient's chronic conditions and co-morbidity symptoms are monitored and maintained or improved  10/25/2024 0055 by Vanessa Viveros RN  Outcome: Progressing  10/24/2024 1833 by Marianne Mathis RN  Outcome: Progressing     Problem: Discharge Planning  Goal: Discharge to home or other facility with appropriate resources  10/25/2024 0055 by Vanessa Viveros RN  Outcome: Progressing  10/24/2024 1833 by Marianne Mathis RN  Outcome: Progressing     Problem: ABCDS Injury Assessment  Goal: Absence of physical injury  10/25/2024 0055 by Vanessa Viveros RN  Outcome: Progressing  10/24/2024 1833 by Marianne Mathis RN  Outcome: Progressing     Problem: Safety - Adult  Goal: Free from fall injury  10/25/2024 0055 by Vanessa Viveros RN  Outcome: Progressing  10/24/2024 1833 by Marianne Mathis RN  Outcome: Progressing

## 2024-11-07 NOTE — PROGRESS NOTES
Interventional Cardiology Consultation     Regi Roche  1945    PCP: Moses Chery MD  Referring Physician: Dr. Fu   Reason for Referral: abnormal EKG  Chief Complaint: \"I am good\"  Chief Complaint   Patient presents with    Follow-Up from Hospital       Subjective:   History of Present Illness: The patient is 79 y.o. female with a past medical history significant for HLD, HTN, DM type 2, pAF s/p ablation 8/2021, and ILR in place. Patient underwent watchman procedure 2/2022 that was unsuccessful due to short ERIN. Patient previously followed with [facility] for primary cardiology. Patient presented to  10/23 with complaints of worsening headaches. /94. EKG in the ED showed LBBB which prompted consult to cardiology.  Rapid response was called overnight due to hypotension, IV fluids started. Upon assessment, patient resting in bed comfortably with family at bedside. Currently on 3L O2 NC. Does not wear oxygen at home. States she has been wearing since rapid response. Patient states she does not remember coming to the ED. States she has a history of headaches, however notes most recent headache to be \"one of the worst\" which prompted visit to ED. Patient denies any chest pain, shortness of breath, or palpitations. States she has been more unsteady on her feet recently, endorses multiple falls. States she had a fall last Wednesday where she did hit her head. Did seek medical attention at [facility], has upcoming MRI of the head.  Patient also endorses known history of LBBB, first diagnosed in 2015 following a back surgery. Has been asymptomatic. Patient here today for hospital follow up. Reports overall she is feeling good since discharge. BP has been well controlled.           Past Medical History:   Diagnosis Date    Arthritis     Back pain     Cancer (HCC)     breast, Left Lumpectomy and radiation      Cerebral artery occlusion with cerebral infarction (HCC)      no

## 2024-11-12 ENCOUNTER — OFFICE VISIT (OUTPATIENT)
Dept: CARDIOLOGY CLINIC | Age: 79
End: 2024-11-12

## 2024-11-12 ENCOUNTER — TELEPHONE (OUTPATIENT)
Dept: CARDIOLOGY CLINIC | Age: 79
End: 2024-11-12

## 2024-11-12 VITALS
BODY MASS INDEX: 27.97 KG/M2 | WEIGHT: 174 LBS | HEIGHT: 66 IN | SYSTOLIC BLOOD PRESSURE: 140 MMHG | DIASTOLIC BLOOD PRESSURE: 70 MMHG | OXYGEN SATURATION: 96 % | HEART RATE: 69 BPM

## 2024-11-12 DIAGNOSIS — Z45.09 ENCOUNTER FOR LOOP RECORDER AT END OF BATTERY LIFE: ICD-10-CM

## 2024-11-12 DIAGNOSIS — I48.0 PAF (PAROXYSMAL ATRIAL FIBRILLATION) (HCC): Primary | ICD-10-CM

## 2024-11-12 DIAGNOSIS — I10 ESSENTIAL HYPERTENSION, BENIGN: Primary | ICD-10-CM

## 2024-11-12 RX ORDER — PIOGLITAZONEHYDROCHLORIDE 45 MG/1
45 TABLET ORAL DAILY
COMMUNITY

## 2024-11-12 RX ORDER — CALCIUM CARBONATE 500(1250)
500 TABLET ORAL DAILY
COMMUNITY

## 2024-11-12 NOTE — TELEPHONE ENCOUNTER
Adrianne,   Patient previously followed with [facility], has loop recorder in place.   Battery has since , was told it can stay in.  Should patient want it removed, are we able to do it at [place] since she follows here now or should she consult back at [facility] where it was placed?  Thanks

## 2024-11-12 NOTE — TELEPHONE ENCOUNTER
Please reach out to patient and schedule loop explant with Dr. Palacios at [facility].    SEE EP CASE REQUEST    Loop Recorder Explant Pre Procedure Instructions     Date:____________________________     Arrive at:_________________________     Procedure time:_____________________        The morning of your procedure you will park in the [facility] parking lot and report directly to the cath lab to check in.  At the information desk stay right and go all the way to the end of the arizmendi, this will take you directly to your check in desk for the cath lab.       Pre-Procedure Instructions  Do not eat or drink anything for two hours prior to your procedure.  Do not use any lotions, creams or perfume the morning of procedure.  You do not need to hold any medication for the procedure.  Cath lab will provide you with all post procedure instructions.  You will follow up one week after implantation for implantation site check to make sure it is healing well.

## 2024-11-21 PROBLEM — I48.0 PAF (PAROXYSMAL ATRIAL FIBRILLATION) (HCC): Status: ACTIVE | Noted: 2024-11-12

## 2024-11-21 NOTE — TELEPHONE ENCOUNTER
Spoke with patient, reviewed preps and will also mail a copy to her.      Loop Recorder Explant Pre Procedure Instructions     Date: 12-12-24     Arrive at: 12:30 pm     Procedure time: 1:30 pm     Cardiologist: Dr. Palacios        The morning of your procedure you will park in the [facility] parking lot and report directly to the cath lab to check in.  At the information desk stay right and go all the way to the end of the arizmendi, this will take you directly to your check in desk for the cath lab.        Pre-Procedure Instructions  Do not eat or drink anything for two hours prior to your procedure.  Do not use any lotions, creams or perfume the morning of procedure.  You do not need to hold any medication for the procedure.  Cath lab will provide you with all post procedure instructions.  You will follow up one week after implantation for implantation site check to make sure it is healing well.

## 2024-12-12 ENCOUNTER — HOSPITAL ENCOUNTER (OUTPATIENT)
Age: 79
Setting detail: OUTPATIENT SURGERY
Discharge: HOME | End: 2024-12-12
Attending: INTERNAL MEDICINE | Admitting: INTERNAL MEDICINE
Payer: MEDICARE

## 2024-12-12 VITALS
HEIGHT: 56 IN | SYSTOLIC BLOOD PRESSURE: 113 MMHG | DIASTOLIC BLOOD PRESSURE: 74 MMHG | BODY MASS INDEX: 39.14 KG/M2 | OXYGEN SATURATION: 100 % | WEIGHT: 174 LBS | HEART RATE: 60 BPM

## 2024-12-12 DIAGNOSIS — I48.0 PAF (PAROXYSMAL ATRIAL FIBRILLATION) (HCC): ICD-10-CM

## 2024-12-12 LAB — ECHO BSA: 1.77 M2

## 2024-12-12 PROCEDURE — 33286 RMVL SUBQ CAR RHYTHM MNTR: CPT | Performed by: INTERNAL MEDICINE

## 2024-12-12 PROCEDURE — 7100000010 HC PHASE II RECOVERY - FIRST 15 MIN: Performed by: INTERNAL MEDICINE

## 2024-12-12 NOTE — PROGRESS NOTES
1355 - procedure complete, discharge instructions given, to car via wheelchair, discharged to home

## 2024-12-12 NOTE — DISCHARGE INSTRUCTIONS
LOOP RECORDER PLACEMENT        Keep dressing in place Left chest wall until your follow up visit with doctor.    Keep dressing dry     May shower as long as keep back to shower.      Please contact the office if you notice any signs of infection:  [phone]      Redness / swelling at the incision site  Fever  Yellow drainage at the site  Pain         You will have a one week follow-up appointment to see a nurse in the office for a wound check to make sure you are healing properly

## 2024-12-12 NOTE — H&P
kg (174 lb)   SpO2 100%   BMI 39.01 kg/m²   NAD  Chest clear non labored  Heart regular rate regular rhythm  Abd soft non tender  No focal neuro deficits  Appropriate mood and affect     Mallampati Airway Assessment:  I     ASA Classification:  Class II - A patient with mild systemic disease     Sedation/Anesthesia Plan:  Local anesthetic     Medications Planned:  Lidocaine       Felix Palacios MD  [facility]   Office: [phone]  Fax: [phone]

## 2024-12-19 ENCOUNTER — NURSE ONLY (OUTPATIENT)
Dept: CARDIOLOGY CLINIC | Age: 79
End: 2024-12-19

## 2024-12-19 DIAGNOSIS — Z45.09 ENCOUNTER FOR LOOP RECORDER AT END OF BATTERY LIFE: Primary | ICD-10-CM

## 2024-12-19 DIAGNOSIS — I48.0 PAF (PAROXYSMAL ATRIAL FIBRILLATION) (HCC): ICD-10-CM

## 2024-12-19 NOTE — PROGRESS NOTES
See cardiology tab    Dressing removed from chest device site prior to visit.  SS removed prior to visit. Pt informed to call office if any redness, swelling, fever, chills, or drainage from site. Pt voiced an understanding.

## 2024-12-22 ENCOUNTER — HOSPITAL ENCOUNTER (EMERGENCY)
Age: 79
Discharge: HOME | End: 2024-12-22
Attending: EMERGENCY MEDICINE
Payer: MEDICARE

## 2024-12-22 ENCOUNTER — APPOINTMENT (OUTPATIENT)
Dept: GENERAL RADIOLOGY | Age: 79
End: 2024-12-22
Payer: MEDICARE

## 2024-12-22 VITALS
RESPIRATION RATE: 16 BRPM | WEIGHT: 170.86 LBS | BODY MASS INDEX: 27.46 KG/M2 | TEMPERATURE: 97 F | OXYGEN SATURATION: 95 % | DIASTOLIC BLOOD PRESSURE: 71 MMHG | SYSTOLIC BLOOD PRESSURE: 112 MMHG | HEIGHT: 66 IN | HEART RATE: 78 BPM

## 2024-12-22 DIAGNOSIS — S62.642A CLOSED NONDISPLACED FRACTURE OF PROXIMAL PHALANX OF RIGHT MIDDLE FINGER, INITIAL ENCOUNTER: ICD-10-CM

## 2024-12-22 DIAGNOSIS — S52.501A CLOSED FRACTURE OF DISTAL END OF RIGHT RADIUS, UNSPECIFIED FRACTURE MORPHOLOGY, INITIAL ENCOUNTER: Primary | ICD-10-CM

## 2024-12-22 PROCEDURE — 99283 EMERGENCY DEPT VISIT LOW MDM: CPT

## 2024-12-22 PROCEDURE — 73130 X-RAY EXAM OF HAND: CPT

## 2024-12-22 PROCEDURE — 73110 X-RAY EXAM OF WRIST: CPT

## 2024-12-22 PROCEDURE — 6370000000 HC RX 637 (ALT 250 FOR IP): Performed by: EMERGENCY MEDICINE

## 2024-12-22 PROCEDURE — 29125 APPL SHORT ARM SPLINT STATIC: CPT

## 2024-12-22 RX ORDER — HYDROCODONE BITARTRATE AND ACETAMINOPHEN 5; 325 MG/1; MG/1
1 TABLET ORAL ONCE
Status: COMPLETED | OUTPATIENT
Start: 2024-12-22 | End: 2024-12-22

## 2024-12-22 RX ORDER — HYDROCODONE BITARTRATE AND ACETAMINOPHEN 5; 325 MG/1; MG/1
1 TABLET ORAL EVERY 8 HOURS PRN
Qty: 17 TABLET | Refills: 0 | Status: SHIPPED | OUTPATIENT
Start: 2024-12-22 | End: 2024-12-29

## 2024-12-22 RX ADMIN — HYDROCODONE BITARTRATE AND ACETAMINOPHEN 1 TABLET: 5; 325 TABLET ORAL at 07:28

## 2024-12-22 ASSESSMENT — PAIN DESCRIPTION - DESCRIPTORS
DESCRIPTORS: ACHING
DESCRIPTORS: ACHING

## 2024-12-22 ASSESSMENT — PAIN SCALES - GENERAL
PAINLEVEL_OUTOF10: 5
PAINLEVEL_OUTOF10: 3

## 2024-12-22 ASSESSMENT — PAIN DESCRIPTION - ORIENTATION
ORIENTATION: RIGHT
ORIENTATION: RIGHT

## 2024-12-22 ASSESSMENT — PAIN DESCRIPTION - FREQUENCY
FREQUENCY: CONTINUOUS
FREQUENCY: CONTINUOUS

## 2024-12-22 ASSESSMENT — PAIN DESCRIPTION - LOCATION
LOCATION: WRIST
LOCATION: WRIST

## 2024-12-22 ASSESSMENT — PAIN DESCRIPTION - PAIN TYPE
TYPE: ACUTE PAIN
TYPE: ACUTE PAIN

## 2024-12-22 ASSESSMENT — PAIN - FUNCTIONAL ASSESSMENT
PAIN_FUNCTIONAL_ASSESSMENT: 0-10
PAIN_FUNCTIONAL_ASSESSMENT: 0-10
PAIN_FUNCTIONAL_ASSESSMENT: PREVENTS OR INTERFERES WITH MANY ACTIVE NOT PASSIVE ACTIVITIES

## 2024-12-22 NOTE — ED NOTES
Dr. Stewart placed volar splint on right wrist with metal finger splint on top of right middle finger. With Nurse assisting

## 2024-12-22 NOTE — ED NOTES
Right wrist swollen. Right middle finger swollen and bruised. Patient had strong radial pulse. She has painful limited movement. Ice pack placed to right wrist

## 2024-12-22 NOTE — ED PROVIDER NOTES
EMERGENCY DEPARTMENT ENCOUNTER     [facility]     Pt Name: Regi Roche   MRN: [id number]   Birthdate 1945   Date of evaluation: 12/22/2024   Provider: Jose Stewart MD   PCP: Moses Chery MD   Note Started: 3:37 PM EST 12/22/24     CHIEF COMPLAINT     Chief Complaint   Patient presents with    Wrist Injury     Pt roller walker slipped out from her. She fell last night. C/o right wrist pain. She denies hitting her head. She takes a blood thinner         HISTORY OF PRESENT ILLNESS:  History from : Patient   Limitations to history : None     Regi Roche is a 79 y.o. female who presents for fall.  Patient reports she fell last night.  She did not strike her head or lose consciousness.  She injured her right wrist and her long finger.  She came for presentation at this time because her daughter woke up and brought her in.    Nursing Notes were all reviewed and agreed with or any disagreements were addressed in the HPI.     ROS: Positives and Pertinent negatives as per HPI.    PAST MEDICAL HISTORY     Past medical history:  has a past medical history of Arthritis, Back pain, Cancer (HCC), Cerebral artery occlusion with cerebral infarction (HCC), Diabetes, High blood pressure, Hyperlipidemia, Seizure (HCC), SVT (supraventricular tachycardia) (HCC), and Type II or unspecified type diabetes mellitus without mention of complication, not stated as uncontrolled.    Past surgical history:  has a past surgical history that includes back surgery; Carpal tunnel release; Hysterectomy; Tonsillectomy; lymph node biopsy (Left); Colonoscopy; Intracapsular cataract extraction (Right, 3/11/2019); Breast lumpectomy (Left); Intracapsular cataract extraction (Left, 3/25/2019); Hip fracture surgery (Right, 3/18/2020); and ep device procedure (N/A, 12/12/2024).    Med list:   No current facility-administered medications on file prior to encounter.     Current Outpatient Medications on

## 2024-12-22 NOTE — ED NOTES
Gave patient and daughter discharge instructions. They both state, understanding. Patient discharged to home.

## 2024-12-23 ENCOUNTER — TELEPHONE (OUTPATIENT)
Dept: ORTHOPEDIC SURGERY | Age: 79
End: 2024-12-23

## 2024-12-23 NOTE — TELEPHONE ENCOUNTER
SW patient. Offered her 9:30 at [place] on 12/27/24 but advised her that we are severely overbooked and there could be long wait times. She chose to keep the scheduled apt on 12/26/24 at  at 3:30.

## 2024-12-23 NOTE — TELEPHONE ENCOUNTER
LVM for patient to return phone call to schedule ED follow up.     Upon return call ,please schedule patient 12/26/24 to FF at 3:30 or 3:45. OK to over ride block.

## 2024-12-23 NOTE — TELEPHONE ENCOUNTER
Other      COULD Pt BE SEEN FRIDAY AM, AT [facility] LOCATION? SHE CAN'T DRIVE VERY FAR AND [facility] IS MUCH CLOSER TO HER.     PLEASE ADVISE.

## 2024-12-26 ENCOUNTER — OFFICE VISIT (OUTPATIENT)
Dept: ORTHOPEDIC SURGERY | Age: 79
End: 2024-12-26

## 2024-12-26 VITALS — HEIGHT: 66 IN | BODY MASS INDEX: 27.32 KG/M2 | WEIGHT: 170 LBS

## 2024-12-26 DIAGNOSIS — S52.501A CLOSED FRACTURE OF DISTAL END OF RIGHT RADIUS, UNSPECIFIED FRACTURE MORPHOLOGY, INITIAL ENCOUNTER: Primary | ICD-10-CM

## 2024-12-28 PROBLEM — S52.501A CLOSED FRACTURE OF RIGHT DISTAL RADIUS: Status: ACTIVE | Noted: 2024-12-28

## 2025-02-05 ENCOUNTER — OFFICE VISIT (OUTPATIENT)
Dept: ORTHOPEDIC SURGERY | Age: 80
End: 2025-02-05

## 2025-02-05 DIAGNOSIS — S52.501A CLOSED FRACTURE OF DISTAL END OF RIGHT RADIUS, UNSPECIFIED FRACTURE MORPHOLOGY, INITIAL ENCOUNTER: Primary | ICD-10-CM

## 2025-02-05 PROCEDURE — 99024 POSTOP FOLLOW-UP VISIT: CPT | Performed by: NURSE PRACTITIONER

## 2025-02-06 NOTE — PROGRESS NOTES
CHIEF COMPLAINT: Right wrist pain/ minimally displaced distal radius fracture.    DATE OF INJURY: 12/22/2024, DOT 12/26/2024    HISTORY:  Ms. Roche is a 79 y.o.  female right handed with h/o CVA with residual right sided weakness who presents today for evaluation of a right wrist injury.  The patient reports that this injury occurred when her walker slipped and she fell.  She was first seen and evaluated in [place], when she was x-rayed and splinted, and asked to f/u with Orthopedics. The patient denies any other injuries. Rates pain a 0/10 VAS and doing much better.  No numbness or tingling sensation.  Denies smoking.     Past Medical History:   Diagnosis Date    Arthritis     Back pain     Cancer (HCC)     breast, Left Lumpectomy and radiation      Cerebral artery occlusion with cerebral infarction (HCC)     Diabetes     High blood pressure     Hyperlipidemia     Seizure (HCC)     10 yrs ago     SVT (supraventricular tachycardia) ([facility])     Type II or unspecified type diabetes mellitus without mention of complication, not stated as uncontrolled        Past Surgical History:   Procedure Laterality Date    BACK SURGERY      BREAST LUMPECTOMY Left     CARPAL TUNNEL RELEASE      left    COLONOSCOPY      EP DEVICE PROCEDURE N/A 12/12/2024    Loop recorder removal performed by Felix Palacios MD at [facility] CARDIAC CATH LAB    HIP FRACTURE SURGERY Right 3/18/2020    RIGHT HIP GAMMA NAIL performed by Moses Brown MD at [facility] OR    HYSTERECTOMY (CERVIX STATUS UNKNOWN)      INTRACAPSULAR CATARACT EXTRACTION Right 3/11/2019    PHACOEMULSIFICATION WITH INTRAOCULAR LENS IMPLANT  performed by Jay Anand MD at [facility]    INTRACAPSULAR CATARACT EXTRACTION Left 3/25/2019    PHACOEMULSIFICATION WITH INTRAOCULAR LENS IMPLANT  performed by Jay Anand MD at [facility]    LYMPH NODE BIOPSY Left     TONSILLECTOMY         Social History     Socioeconomic History    Marital status:

## 2025-02-27 ENCOUNTER — HOSPITAL ENCOUNTER (INPATIENT)
Age: 80
LOS: 2 days | Discharge: HOME | End: 2025-03-01
Attending: STUDENT IN AN ORGANIZED HEALTH CARE EDUCATION/TRAINING PROGRAM | Admitting: INTERNAL MEDICINE
Payer: MEDICARE

## 2025-02-27 ENCOUNTER — APPOINTMENT (OUTPATIENT)
Age: 80
End: 2025-02-27
Attending: INTERNAL MEDICINE
Payer: MEDICARE

## 2025-02-27 ENCOUNTER — APPOINTMENT (OUTPATIENT)
Dept: CT IMAGING | Age: 80
End: 2025-02-27
Payer: MEDICARE

## 2025-02-27 ENCOUNTER — APPOINTMENT (OUTPATIENT)
Dept: GENERAL RADIOLOGY | Age: 80
End: 2025-02-27
Payer: MEDICARE

## 2025-02-27 DIAGNOSIS — R41.82 ALTERED MENTAL STATUS, UNSPECIFIED ALTERED MENTAL STATUS TYPE: Primary | ICD-10-CM

## 2025-02-27 DIAGNOSIS — A41.9 SEPSIS, DUE TO UNSPECIFIED ORGANISM, UNSPECIFIED WHETHER ACUTE ORGAN DYSFUNCTION PRESENT (HCC): ICD-10-CM

## 2025-02-27 DIAGNOSIS — J10.1 INFLUENZA A: ICD-10-CM

## 2025-02-27 LAB
ALBUMIN SERPL-MCNC: 4.2 G/DL (ref 3.4–5)
ALBUMIN/GLOB SERPL: 1.4 {RATIO} (ref 1.1–2.2)
ALP SERPL-CCNC: 65 U/L (ref 40–129)
ALT SERPL-CCNC: 20 U/L (ref 10–40)
ANION GAP SERPL CALCULATED.3IONS-SCNC: 16 MMOL/L (ref 3–16)
APTT BLD: 32.7 SEC (ref 22.1–36.4)
AST SERPL-CCNC: 26 U/L (ref 15–37)
BACTERIA URNS QL MICRO: NORMAL /HPF
BASOPHILS # BLD: 0 K/UL (ref 0–0.2)
BASOPHILS NFR BLD: 0.3 %
BILIRUB SERPL-MCNC: 0.3 MG/DL (ref 0–1)
BILIRUB UR QL STRIP.AUTO: NEGATIVE
BUN SERPL-MCNC: 15 MG/DL (ref 7–20)
CALCIUM SERPL-MCNC: 10 MG/DL (ref 8.3–10.6)
CHLORIDE SERPL-SCNC: 98 MMOL/L (ref 99–110)
CLARITY UR: CLEAR
CO2 SERPL-SCNC: 22 MMOL/L (ref 21–32)
COLOR UR: YELLOW
CREAT SERPL-MCNC: 0.7 MG/DL (ref 0.6–1.2)
DEPRECATED RDW RBC AUTO: 14.3 % (ref 12.4–15.4)
DEPRECATED RDW RBC AUTO: 14.6 % (ref 12.4–15.4)
EKG ATRIAL RATE: 104 BPM
EKG DIAGNOSIS: NORMAL
EKG P AXIS: 90 DEGREES
EKG P-R INTERVAL: 188 MS
EKG Q-T INTERVAL: 378 MS
EKG QRS DURATION: 130 MS
EKG QTC CALCULATION (BAZETT): 497 MS
EKG R AXIS: -17 DEGREES
EKG T AXIS: 139 DEGREES
EKG VENTRICULAR RATE: 104 BPM
EOSINOPHIL # BLD: 0 K/UL (ref 0–0.6)
EOSINOPHIL NFR BLD: 0.2 %
EPI CELLS #/AREA URNS AUTO: 0 /HPF (ref 0–5)
FLUAV + FLUBV AG NOSE IA.RAPID: DETECTED
FLUAV + FLUBV AG NOSE IA.RAPID: NOT DETECTED
GFR SERPLBLD CREATININE-BSD FMLA CKD-EPI: 89 ML/MIN/{1.73_M2}
GLUCOSE BLD-MCNC: 146 MG/DL (ref 70–99)
GLUCOSE BLD-MCNC: 153 MG/DL (ref 70–99)
GLUCOSE BLD-MCNC: 156 MG/DL (ref 70–99)
GLUCOSE BLD-MCNC: 207 MG/DL (ref 70–99)
GLUCOSE BLD-MCNC: 228 MG/DL (ref 70–99)
GLUCOSE SERPL-MCNC: 178 MG/DL (ref 70–99)
GLUCOSE UR STRIP.AUTO-MCNC: NEGATIVE MG/DL
HCT VFR BLD AUTO: 37.2 % (ref 36–48)
HCT VFR BLD AUTO: 41.1 % (ref 36–48)
HGB BLD-MCNC: 12.7 G/DL (ref 12–16)
HGB BLD-MCNC: 13.8 G/DL (ref 12–16)
HGB UR QL STRIP.AUTO: NEGATIVE
HYALINE CASTS #/AREA URNS AUTO: 1 /LPF (ref 0–8)
KETONES UR STRIP.AUTO-MCNC: 40 MG/DL
LACTATE BLDV-SCNC: 1.7 MMOL/L (ref 0.4–2)
LACTATE BLDV-SCNC: 2 MMOL/L (ref 0.4–1.9)
LEUKOCYTE ESTERASE UR QL STRIP.AUTO: NEGATIVE
LYMPHOCYTES # BLD: 0.5 K/UL (ref 1–5.1)
LYMPHOCYTES NFR BLD: 4.8 %
MAGNESIUM SERPL-MCNC: 1.09 MG/DL (ref 1.8–2.4)
MAGNESIUM SERPL-MCNC: 3.2 MG/DL (ref 1.8–2.4)
MCH RBC QN AUTO: 30.8 PG (ref 26–34)
MCH RBC QN AUTO: 31.4 PG (ref 26–34)
MCHC RBC AUTO-ENTMCNC: 33.7 G/DL (ref 31–36)
MCHC RBC AUTO-ENTMCNC: 34.1 G/DL (ref 31–36)
MCV RBC AUTO: 91.5 FL (ref 80–100)
MCV RBC AUTO: 92.2 FL (ref 80–100)
MONOCYTES # BLD: 0.7 K/UL (ref 0–1.3)
MONOCYTES NFR BLD: 6.3 %
NEUTROPHILS # BLD: 10 K/UL (ref 1.7–7.7)
NEUTROPHILS NFR BLD: 88.4 %
NITRITE UR QL STRIP.AUTO: NEGATIVE
PERFORMED ON: ABNORMAL
PH UR STRIP.AUTO: 5.5 [PH] (ref 5–8)
PLATELET # BLD AUTO: 206 K/UL (ref 135–450)
PLATELET # BLD AUTO: 259 K/UL (ref 135–450)
PMV BLD AUTO: 7.1 FL (ref 5–10.5)
PMV BLD AUTO: 7.5 FL (ref 5–10.5)
POTASSIUM SERPL-SCNC: 3.6 MMOL/L (ref 3.5–5.1)
PROCALCITONIN SERPL IA-MCNC: 0.18 NG/ML (ref 0–0.15)
PROCALCITONIN SERPL IA-MCNC: 0.37 NG/ML (ref 0–0.15)
PROT SERPL-MCNC: 7.3 G/DL (ref 6.4–8.2)
PROT UR STRIP.AUTO-MCNC: 30 MG/DL
RBC # BLD AUTO: 4.04 M/UL (ref 4–5.2)
RBC # BLD AUTO: 4.49 M/UL (ref 4–5.2)
RBC CLUMPS #/AREA URNS AUTO: 1 /HPF (ref 0–4)
SARS-COV-2 RDRP RESP QL NAA+PROBE: NOT DETECTED
SODIUM SERPL-SCNC: 136 MMOL/L (ref 136–145)
SP GR UR STRIP.AUTO: 1.03 (ref 1–1.03)
TROPONIN, HIGH SENSITIVITY: 26 NG/L (ref 0–14)
TROPONIN, HIGH SENSITIVITY: 47 NG/L (ref 0–14)
UA COMPLETE W REFLEX CULTURE PNL UR: ABNORMAL
UA DIPSTICK W REFLEX MICRO PNL UR: YES
URN SPEC COLLECT METH UR: ABNORMAL
UROBILINOGEN UR STRIP-ACNC: 0.2 E.U./DL
WBC # BLD AUTO: 10.5 K/UL (ref 4–11)
WBC # BLD AUTO: 11.3 K/UL (ref 4–11)
WBC #/AREA URNS AUTO: 0 /HPF (ref 0–5)

## 2025-02-27 PROCEDURE — 97530 THERAPEUTIC ACTIVITIES: CPT

## 2025-02-27 PROCEDURE — 81001 URINALYSIS AUTO W/SCOPE: CPT

## 2025-02-27 PROCEDURE — 6360000002 HC RX W HCPCS: Performed by: INTERNAL MEDICINE

## 2025-02-27 PROCEDURE — 92523 SPEECH SOUND LANG COMPREHEN: CPT

## 2025-02-27 PROCEDURE — 85025 COMPLETE CBC W/AUTO DIFF WBC: CPT

## 2025-02-27 PROCEDURE — 97166 OT EVAL MOD COMPLEX 45 MIN: CPT

## 2025-02-27 PROCEDURE — 84484 ASSAY OF TROPONIN QUANT: CPT

## 2025-02-27 PROCEDURE — 70450 CT HEAD/BRAIN W/O DYE: CPT

## 2025-02-27 PROCEDURE — 5A0935A ASSISTANCE WITH RESPIRATORY VENTILATION, LESS THAN 24 CONSECUTIVE HOURS, HIGH NASAL FLOW/VELOCITY: ICD-10-PCS | Performed by: INTERNAL MEDICINE

## 2025-02-27 PROCEDURE — 85027 COMPLETE CBC AUTOMATED: CPT

## 2025-02-27 PROCEDURE — 2500000003 HC RX 250 WO HCPCS: Performed by: INTERNAL MEDICINE

## 2025-02-27 PROCEDURE — 2580000003 HC RX 258: Performed by: INTERNAL MEDICINE

## 2025-02-27 PROCEDURE — 6360000004 HC RX CONTRAST MEDICATION: Performed by: INTERNAL MEDICINE

## 2025-02-27 PROCEDURE — 71045 X-RAY EXAM CHEST 1 VIEW: CPT

## 2025-02-27 PROCEDURE — 84145 PROCALCITONIN (PCT): CPT

## 2025-02-27 PROCEDURE — 83735 ASSAY OF MAGNESIUM: CPT

## 2025-02-27 PROCEDURE — 93005 ELECTROCARDIOGRAM TRACING: CPT | Performed by: STUDENT IN AN ORGANIZED HEALTH CARE EDUCATION/TRAINING PROGRAM

## 2025-02-27 PROCEDURE — 87502 INFLUENZA DNA AMP PROBE: CPT

## 2025-02-27 PROCEDURE — 6360000002 HC RX W HCPCS: Performed by: STUDENT IN AN ORGANIZED HEALTH CARE EDUCATION/TRAINING PROGRAM

## 2025-02-27 PROCEDURE — 96365 THER/PROPH/DIAG IV INF INIT: CPT

## 2025-02-27 PROCEDURE — 80053 COMPREHEN METABOLIC PANEL: CPT

## 2025-02-27 PROCEDURE — 2580000003 HC RX 258: Performed by: STUDENT IN AN ORGANIZED HEALTH CARE EDUCATION/TRAINING PROGRAM

## 2025-02-27 PROCEDURE — 2700000000 HC OXYGEN THERAPY PER DAY

## 2025-02-27 PROCEDURE — 87635 SARS-COV-2 COVID-19 AMP PRB: CPT

## 2025-02-27 PROCEDURE — 6370000000 HC RX 637 (ALT 250 FOR IP): Performed by: INTERNAL MEDICINE

## 2025-02-27 PROCEDURE — 96361 HYDRATE IV INFUSION ADD-ON: CPT

## 2025-02-27 PROCEDURE — 87040 BLOOD CULTURE FOR BACTERIA: CPT

## 2025-02-27 PROCEDURE — 83605 ASSAY OF LACTIC ACID: CPT

## 2025-02-27 PROCEDURE — 93306 TTE W/DOPPLER COMPLETE: CPT

## 2025-02-27 PROCEDURE — 85730 THROMBOPLASTIN TIME PARTIAL: CPT

## 2025-02-27 PROCEDURE — 92610 EVALUATE SWALLOWING FUNCTION: CPT

## 2025-02-27 PROCEDURE — 6370000000 HC RX 637 (ALT 250 FOR IP): Performed by: STUDENT IN AN ORGANIZED HEALTH CARE EDUCATION/TRAINING PROGRAM

## 2025-02-27 PROCEDURE — 93010 ELECTROCARDIOGRAM REPORT: CPT | Performed by: INTERNAL MEDICINE

## 2025-02-27 PROCEDURE — 94761 N-INVAS EAR/PLS OXIMETRY MLT: CPT

## 2025-02-27 PROCEDURE — 87641 MR-STAPH DNA AMP PROBE: CPT

## 2025-02-27 PROCEDURE — 2060000000 HC ICU INTERMEDIATE R&B

## 2025-02-27 PROCEDURE — 36415 COLL VENOUS BLD VENIPUNCTURE: CPT

## 2025-02-27 PROCEDURE — 70498 CT ANGIOGRAPHY NECK: CPT

## 2025-02-27 PROCEDURE — 94760 N-INVAS EAR/PLS OXIMETRY 1: CPT

## 2025-02-27 PROCEDURE — 99285 EMERGENCY DEPT VISIT HI MDM: CPT

## 2025-02-27 RX ORDER — ONDANSETRON 4 MG/1
4 TABLET, ORALLY DISINTEGRATING ORAL EVERY 8 HOURS PRN
Status: DISCONTINUED | OUTPATIENT
Start: 2025-02-27 | End: 2025-02-27

## 2025-02-27 RX ORDER — SODIUM CHLORIDE 9 MG/ML
30 INJECTION, SOLUTION INTRAVENOUS ONCE
Status: COMPLETED | OUTPATIENT
Start: 2025-02-27 | End: 2025-02-27

## 2025-02-27 RX ORDER — POTASSIUM CHLORIDE 7.45 MG/ML
10 INJECTION INTRAVENOUS PRN
Status: DISCONTINUED | OUTPATIENT
Start: 2025-02-27 | End: 2025-03-01 | Stop reason: HOSPADM

## 2025-02-27 RX ORDER — MIDODRINE HYDROCHLORIDE 5 MG/1
5 TABLET ORAL
Status: DISCONTINUED | OUTPATIENT
Start: 2025-02-27 | End: 2025-03-01

## 2025-02-27 RX ORDER — ACETAMINOPHEN 325 MG/1
650 TABLET ORAL EVERY 6 HOURS PRN
Status: DISCONTINUED | OUTPATIENT
Start: 2025-02-27 | End: 2025-03-01 | Stop reason: HOSPADM

## 2025-02-27 RX ORDER — POLYETHYLENE GLYCOL 3350 17 G/17G
17 POWDER, FOR SOLUTION ORAL DAILY PRN
Status: DISCONTINUED | OUTPATIENT
Start: 2025-02-27 | End: 2025-03-01 | Stop reason: HOSPADM

## 2025-02-27 RX ORDER — DEXTROSE MONOHYDRATE 100 MG/ML
INJECTION, SOLUTION INTRAVENOUS CONTINUOUS PRN
Status: DISCONTINUED | OUTPATIENT
Start: 2025-02-27 | End: 2025-03-01 | Stop reason: HOSPADM

## 2025-02-27 RX ORDER — IOPAMIDOL 755 MG/ML
75 INJECTION, SOLUTION INTRAVASCULAR
Status: COMPLETED | OUTPATIENT
Start: 2025-02-27 | End: 2025-02-27

## 2025-02-27 RX ORDER — POLYETHYLENE GLYCOL 3350 17 G/17G
17 POWDER, FOR SOLUTION ORAL DAILY PRN
Status: DISCONTINUED | OUTPATIENT
Start: 2025-02-27 | End: 2025-02-27

## 2025-02-27 RX ORDER — MAGNESIUM SULFATE IN WATER 40 MG/ML
2000 INJECTION, SOLUTION INTRAVENOUS PRN
Status: DISCONTINUED | OUTPATIENT
Start: 2025-02-27 | End: 2025-03-01 | Stop reason: HOSPADM

## 2025-02-27 RX ORDER — SODIUM CHLORIDE 0.9 % (FLUSH) 0.9 %
5-40 SYRINGE (ML) INJECTION EVERY 12 HOURS SCHEDULED
Status: DISCONTINUED | OUTPATIENT
Start: 2025-02-27 | End: 2025-03-01 | Stop reason: HOSPADM

## 2025-02-27 RX ORDER — SODIUM CHLORIDE 0.9 % (FLUSH) 0.9 %
5-40 SYRINGE (ML) INJECTION PRN
Status: DISCONTINUED | OUTPATIENT
Start: 2025-02-27 | End: 2025-03-01 | Stop reason: HOSPADM

## 2025-02-27 RX ORDER — DULOXETIN HYDROCHLORIDE 60 MG/1
60 CAPSULE, DELAYED RELEASE ORAL 2 TIMES DAILY
Status: DISCONTINUED | OUTPATIENT
Start: 2025-02-27 | End: 2025-03-01 | Stop reason: HOSPADM

## 2025-02-27 RX ORDER — POTASSIUM CHLORIDE 1500 MG/1
40 TABLET, EXTENDED RELEASE ORAL PRN
Status: DISCONTINUED | OUTPATIENT
Start: 2025-02-27 | End: 2025-03-01 | Stop reason: HOSPADM

## 2025-02-27 RX ORDER — ALPRAZOLAM 0.5 MG
1 TABLET ORAL 2 TIMES DAILY PRN
Status: DISCONTINUED | OUTPATIENT
Start: 2025-02-27 | End: 2025-03-01 | Stop reason: HOSPADM

## 2025-02-27 RX ORDER — METOPROLOL TARTRATE 50 MG
50 TABLET ORAL 2 TIMES DAILY
Status: DISCONTINUED | OUTPATIENT
Start: 2025-02-27 | End: 2025-03-01 | Stop reason: HOSPADM

## 2025-02-27 RX ORDER — ONDANSETRON 2 MG/ML
4 INJECTION INTRAMUSCULAR; INTRAVENOUS EVERY 6 HOURS PRN
Status: DISCONTINUED | OUTPATIENT
Start: 2025-02-27 | End: 2025-03-01 | Stop reason: HOSPADM

## 2025-02-27 RX ORDER — GLUCAGON 1 MG/ML
1 KIT INJECTION PRN
Status: DISCONTINUED | OUTPATIENT
Start: 2025-02-27 | End: 2025-03-01 | Stop reason: HOSPADM

## 2025-02-27 RX ORDER — ONDANSETRON 2 MG/ML
4 INJECTION INTRAMUSCULAR; INTRAVENOUS EVERY 6 HOURS PRN
Status: DISCONTINUED | OUTPATIENT
Start: 2025-02-27 | End: 2025-02-27

## 2025-02-27 RX ORDER — FUROSEMIDE 40 MG/1
20 TABLET ORAL DAILY PRN
Status: DISCONTINUED | OUTPATIENT
Start: 2025-02-27 | End: 2025-03-01 | Stop reason: HOSPADM

## 2025-02-27 RX ORDER — 0.9 % SODIUM CHLORIDE 0.9 %
500 INTRAVENOUS SOLUTION INTRAVENOUS ONCE
Status: COMPLETED | OUTPATIENT
Start: 2025-02-27 | End: 2025-02-27

## 2025-02-27 RX ORDER — ASPIRIN 300 MG/1
300 SUPPOSITORY RECTAL DAILY
Status: DISCONTINUED | OUTPATIENT
Start: 2025-02-27 | End: 2025-03-01 | Stop reason: HOSPADM

## 2025-02-27 RX ORDER — PANTOPRAZOLE SODIUM 40 MG/1
40 TABLET, DELAYED RELEASE ORAL NIGHTLY
Status: DISCONTINUED | OUTPATIENT
Start: 2025-02-27 | End: 2025-03-01 | Stop reason: HOSPADM

## 2025-02-27 RX ORDER — SODIUM CHLORIDE 9 MG/ML
INJECTION, SOLUTION INTRAVENOUS PRN
Status: DISCONTINUED | OUTPATIENT
Start: 2025-02-27 | End: 2025-03-01 | Stop reason: HOSPADM

## 2025-02-27 RX ORDER — PIOGLITAZONE 45 MG/1
45 TABLET ORAL DAILY
Status: DISCONTINUED | OUTPATIENT
Start: 2025-02-27 | End: 2025-02-27

## 2025-02-27 RX ORDER — OSELTAMIVIR PHOSPHATE 75 MG/1
75 CAPSULE ORAL 2 TIMES DAILY
Status: DISCONTINUED | OUTPATIENT
Start: 2025-02-27 | End: 2025-03-01 | Stop reason: HOSPADM

## 2025-02-27 RX ORDER — LISINOPRIL 10 MG/1
20 TABLET ORAL DAILY
Status: DISCONTINUED | OUTPATIENT
Start: 2025-02-27 | End: 2025-03-01 | Stop reason: HOSPADM

## 2025-02-27 RX ORDER — QUETIAPINE FUMARATE 25 MG/1
100 TABLET, FILM COATED ORAL NIGHTLY
Status: DISCONTINUED | OUTPATIENT
Start: 2025-02-27 | End: 2025-02-27

## 2025-02-27 RX ORDER — ONDANSETRON 4 MG/1
4 TABLET, ORALLY DISINTEGRATING ORAL EVERY 8 HOURS PRN
Status: DISCONTINUED | OUTPATIENT
Start: 2025-02-27 | End: 2025-03-01 | Stop reason: HOSPADM

## 2025-02-27 RX ORDER — ACETAMINOPHEN 650 MG/1
650 SUPPOSITORY RECTAL ONCE
Status: COMPLETED | OUTPATIENT
Start: 2025-02-27 | End: 2025-02-27

## 2025-02-27 RX ORDER — INSULIN LISPRO 100 [IU]/ML
0-4 INJECTION, SOLUTION INTRAVENOUS; SUBCUTANEOUS
Status: DISCONTINUED | OUTPATIENT
Start: 2025-02-27 | End: 2025-03-01 | Stop reason: HOSPADM

## 2025-02-27 RX ORDER — CALCIUM CARBONATE 500(1250)
500 TABLET ORAL
Status: DISCONTINUED | OUTPATIENT
Start: 2025-02-27 | End: 2025-03-01 | Stop reason: HOSPADM

## 2025-02-27 RX ORDER — POTASSIUM CHLORIDE 1500 MG/1
10 TABLET, EXTENDED RELEASE ORAL DAILY
Status: DISCONTINUED | OUTPATIENT
Start: 2025-02-27 | End: 2025-03-01 | Stop reason: HOSPADM

## 2025-02-27 RX ORDER — ASPIRIN 81 MG/1
81 TABLET, CHEWABLE ORAL DAILY
Status: DISCONTINUED | OUTPATIENT
Start: 2025-02-27 | End: 2025-03-01 | Stop reason: HOSPADM

## 2025-02-27 RX ORDER — ATORVASTATIN CALCIUM 80 MG/1
80 TABLET, FILM COATED ORAL NIGHTLY
Status: DISCONTINUED | OUTPATIENT
Start: 2025-02-27 | End: 2025-03-01 | Stop reason: HOSPADM

## 2025-02-27 RX ORDER — INSULIN GLARGINE 100 [IU]/ML
10 INJECTION, SOLUTION SUBCUTANEOUS DAILY
Status: DISCONTINUED | OUTPATIENT
Start: 2025-02-27 | End: 2025-03-01 | Stop reason: HOSPADM

## 2025-02-27 RX ORDER — ACETAMINOPHEN 650 MG/1
650 SUPPOSITORY RECTAL EVERY 6 HOURS PRN
Status: DISCONTINUED | OUTPATIENT
Start: 2025-02-27 | End: 2025-03-01 | Stop reason: HOSPADM

## 2025-02-27 RX ADMIN — ASPIRIN 81 MG: 81 TABLET, CHEWABLE ORAL at 11:43

## 2025-02-27 RX ADMIN — OSELTAMIVIR PHOSPHATE 75 MG: 75 CAPSULE ORAL at 21:39

## 2025-02-27 RX ADMIN — DULOXETINE HYDROCHLORIDE 60 MG: 60 CAPSULE, DELAYED RELEASE ORAL at 21:39

## 2025-02-27 RX ADMIN — METOPROLOL TARTRATE 50 MG: 50 TABLET, FILM COATED ORAL at 11:15

## 2025-02-27 RX ADMIN — CEFEPIME 2000 MG: 2 INJECTION, POWDER, FOR SOLUTION INTRAVENOUS at 05:34

## 2025-02-27 RX ADMIN — IOPAMIDOL 75 ML: 755 INJECTION, SOLUTION INTRAVENOUS at 10:43

## 2025-02-27 RX ADMIN — MAGNESIUM SULFATE HEPTAHYDRATE 2000 MG: 40 INJECTION, SOLUTION INTRAVENOUS at 11:38

## 2025-02-27 RX ADMIN — SODIUM CHLORIDE, PRESERVATIVE FREE 10 ML: 5 INJECTION INTRAVENOUS at 21:43

## 2025-02-27 RX ADMIN — METOPROLOL TARTRATE 50 MG: 50 TABLET, FILM COATED ORAL at 21:39

## 2025-02-27 RX ADMIN — SODIUM CHLORIDE, PRESERVATIVE FREE 10 ML: 5 INJECTION INTRAVENOUS at 11:45

## 2025-02-27 RX ADMIN — MIDODRINE HYDROCHLORIDE 5 MG: 5 TABLET ORAL at 17:14

## 2025-02-27 RX ADMIN — OSELTAMIVIR PHOSPHATE 75 MG: 75 CAPSULE ORAL at 11:15

## 2025-02-27 RX ADMIN — LISINOPRIL 20 MG: 10 TABLET ORAL at 11:15

## 2025-02-27 RX ADMIN — POTASSIUM CHLORIDE 10 MEQ: 1500 TABLET, EXTENDED RELEASE ORAL at 11:33

## 2025-02-27 RX ADMIN — CEFEPIME 2000 MG: 2 INJECTION, POWDER, FOR SOLUTION INTRAVENOUS at 16:19

## 2025-02-27 RX ADMIN — CEFEPIME 2000 MG: 2 INJECTION, POWDER, FOR SOLUTION INTRAVENOUS at 23:52

## 2025-02-27 RX ADMIN — SODIUM CHLORIDE 500 ML: 9 INJECTION, SOLUTION INTRAVENOUS at 16:17

## 2025-02-27 RX ADMIN — CALCIUM 500 MG: 500 TABLET ORAL at 11:33

## 2025-02-27 RX ADMIN — VANCOMYCIN HYDROCHLORIDE 1000 MG: 1 INJECTION, POWDER, LYOPHILIZED, FOR SOLUTION INTRAVENOUS at 16:26

## 2025-02-27 RX ADMIN — PANTOPRAZOLE SODIUM 40 MG: 40 TABLET, DELAYED RELEASE ORAL at 21:39

## 2025-02-27 RX ADMIN — SODIUM CHLORIDE 30 ML/KG/HR: 9 INJECTION, SOLUTION INTRAVENOUS at 03:31

## 2025-02-27 RX ADMIN — ACETAMINOPHEN 650 MG: 650 SUPPOSITORY RECTAL at 03:17

## 2025-02-27 RX ADMIN — ACETAMINOPHEN 650 MG: 325 TABLET ORAL at 11:42

## 2025-02-27 RX ADMIN — SODIUM CHLORIDE, PRESERVATIVE FREE 10 ML: 5 INJECTION INTRAVENOUS at 11:39

## 2025-02-27 RX ADMIN — INSULIN LISPRO 1 UNITS: 100 INJECTION, SOLUTION INTRAVENOUS; SUBCUTANEOUS at 13:24

## 2025-02-27 RX ADMIN — ATORVASTATIN CALCIUM 80 MG: 80 TABLET, FILM COATED ORAL at 21:39

## 2025-02-27 RX ADMIN — DULOXETINE HYDROCHLORIDE 60 MG: 60 CAPSULE, DELAYED RELEASE ORAL at 11:15

## 2025-02-27 RX ADMIN — MAGNESIUM SULFATE HEPTAHYDRATE 2000 MG: 40 INJECTION, SOLUTION INTRAVENOUS at 12:58

## 2025-02-27 ASSESSMENT — PAIN - FUNCTIONAL ASSESSMENT: PAIN_FUNCTIONAL_ASSESSMENT: NONE - DENIES PAIN

## 2025-02-27 ASSESSMENT — PAIN DESCRIPTION - LOCATION: LOCATION: GENERALIZED

## 2025-02-27 ASSESSMENT — PAIN SCALES - GENERAL: PAINLEVEL_OUTOF10: 3

## 2025-02-27 NOTE — ED NOTES
Observed that pt had good pleth on monitor for SpO2 however SpO2 was dropping to a low of 86%. Pt placed on 4 lpm NC without change. SpO2 sensor changed to try different location, reading still mid 80's. Pt raised to 6 lpm NC and SpO2 raised to 88%. RT contacted who advised he would be down as soon as possible. Pt being set up with highflow O2 NC.Pt placed on 15 lpm via high flow cannula and now 92%.

## 2025-02-27 NOTE — PROGRESS NOTES
Patient have trouble word finding. This is significantly different since conversations with patient this morning. Code stroke called. Initial NIH 4. Stat CT/CTA ordered per Dr. Fu.

## 2025-02-27 NOTE — PROGRESS NOTES
Facility/Department: [address] PROGRESSIVE CARE  SLP Clinical Swallow Evaluation and Speech Language Cognitive Assessment     Patient: Regi Roche   : 1945   MRN: [id number]      Evaluation Date: 2025      Admitting Dx:   Influenza A [J10.1]  Altered mental status, unspecified altered mental status type [R41.82]  Sepsis, due to unspecified organism, unspecified whether acute organ dysfunction present (HCC) [A41.9]   has a past medical history of Arthritis, Back pain, Cancer (HCC), Cerebral artery occlusion with cerebral infarction (HCC), Diabetes, High blood pressure, Hyperlipidemia, Seizure (HCC), SVT (supraventricular tachycardia), and Type II or unspecified type diabetes mellitus without mention of complication, not stated as uncontrolled.   has a past surgical history that includes back surgery; Carpal tunnel release; Hysterectomy; Tonsillectomy; lymph node biopsy (Left); Colonoscopy; Intracapsular cataract extraction (Right, 3/11/2019); Breast lumpectomy (Left); Intracapsular cataract extraction (Left, 3/25/2019); Hip fracture surgery (Right, 3/18/2020); and ep device procedure (N/A, 2024).  Allergies:   Allergies   Allergen Reactions    Lexapro [Escitalopram Oxalate] Other (See Comments)     Makes her suicidal    Wellbutrin [Bupropion] Other (See Comments)     Makes her suicidal     Speech Therapy History: Patient seen here 2019 following CVA  Dysphagia History including instrumental assessment: Patient seen for dysphagia 2019; recommendation for regular/thin  GI history: N/A  ENT history: N/A  Prior level of function (communication, home/med/finance management, driving, etc) and living status:  Lives with daughter; reports daughter does medication management, active                           Onset: 2025     Reason for referral: SLP evaluation orders received due to CVA protocol .    CURRENT ENCOUNTER DIAGNOSTICS/COURSE OF ADMISSION     H&P: 79 y.o. female with  imprecise articulation, involuntary repetition, blocks    Voice:   WNL    COGNITION    Further assessment warranted    If patient discharges prior to next visit, this note will serve as discharge.     Time code minutes: 15  Total Time:  30    Electronically signed by:    Rylee Eastman MA, CCC-SLP #[id number]  Speech-Language Pathologist  On 02/27/25 at 12:43 pm

## 2025-02-27 NOTE — PROGRESS NOTES
According to patient and son at bedside patient does not take insulin at home. AM blood sugar was 153. Patient did not eat any breakfast. Patient has ordered Lantus 10 units. Perfect serve sent to attending. Ok to not give Lantus this AM.

## 2025-02-27 NOTE — ED NOTES
ED TO INPATIENT SBAR HANDOFF    Patient Name: Regi Roche   Preferred Name: Regi  : 1945  79 y.o.   Family/Caregiver Present: no   Code Status Order: Prior  PO Status: NPO:No  Telemetry Order: Yes  C-SSRS: Risk of Suicide: No Risk  Sitter no   Restraints:   No  Sepsis Risk Score      Situation  Chief Complaint   Patient presents with    Altered Mental Status     Ill for last few days, per daughter, EMS states that she has been waking up in the night with fevers and increased confusion. Prior CVA history with residual right sided deficit.      Brief Description of Patient's Condition: Pt presents from home via EMS. Pt daughter is live in primary caregiver. Pt has been becoming increasingly ill for the last two to three days. Spiking fevers of 101 to 102. Pt with frequent, nonproductive cough. Pt with increased confusion, speaks words that don't correlate to conversation or questions. Pt was straight catheterized for urine sample early in ED stay then placed on purewick. Pt given total of 2307 ml NS as well as 650 tylenol suppository for fever, 2 G cefepime and 1500 mg vancomycin. Pt experienced desat of SpO2 while room air into 80's. NC attempted and titrated up without significant improvement. RT contacted and pt placed on high flow NC at 15 lpm to hold SpO2 of 89 to 90%. Pt with prior history of (2) CVA events with residual right sided deficits. Pt normally alert and oriented and ambulatory. Pt daughter states with current illness and increasing weakness she has experienced a couple falls at home recently. Daughter remained at bedside and was helpful throughout stay.   Mental Status: disoriented  Arrived from:Home  Imaging:   CT HEAD WO CONTRAST   Final Result   1. No acute intracranial abnormality.   2. Mild diffuse cerebral atrophy and chronic white matter ischemic change.   3. Tiny old bilateral basal ganglia lacunar infarcts unchanged from prior.   4. Mild paranasal sinus disease.         XR CHEST

## 2025-02-27 NOTE — ED NOTES
Pt saturated in urine, purewick dislodged. Pt linen changed and new brief applied. Rectal temperature obtained, 102.2.

## 2025-02-27 NOTE — CONSULTS
Clinical Pharmacy Note  Vancomycin Consult    Regi Roche is a 79 y.o. female ordered vancomycin for CAP; consult received from Dr. Fernandez to manage therapy. Also receiving cefepime.    Allergies:  Lexapro [escitalopram oxalate] and Wellbutrin [bupropion]     Temp max:  Temp (24hrs), Av.9 °F (38.3 °C), Min:98.5 °F (36.9 °C), Max:102.9 °F (39.4 °C)      Recent Labs     25  0302 25  1037   WBC 11.3* 10.5       Recent Labs     25  0302   BUN 15   CREATININE 0.7         Intake/Output Summary (Last 24 hours) at 2025 1433  Last data filed at 2025 1115  Gross per 24 hour   Intake 2657 ml   Output --   Net 2657 ml       Culture Results:  pending    Ht Readings from Last 1 Encounters:   25 1.676 m (5' 6\")        Wt Readings from Last 1 Encounters:   25 76.9 kg (169 lb 8.5 oz)         Estimated Creatinine Clearance: 70 mL/min (based on SCr of 0.7 mg/dL).    Assessment/Plan:  Day # 1 of vancomycin.  Vancomycin 1000 mg IV every 12 hours.    Goal -600  Predicted AUC (24-4h): 442, AUC (ss): 576    Thank you for the consult.     Patricia Tuttle, PharmD.  2025  2:33 PM

## 2025-02-27 NOTE — PROGRESS NOTES
Occupational Therapy  Facility/Department: [address] PROGRESSIVE CARE  Occupational Therapy Initial Assessment and Tentative D/C      Name: Regi Roche  : 1945  MRN: [id number]  Date of Service: 2025    Staff assist recommendation: michele x1      Discharge Recommendations: Regi Roche scored a 15/24 on the AM-PAC ADL Inpatient form. Current research shows that an AM-PAC score of 17 or less is typically not associated with a discharge to the patient's home setting. Based on the patient's AM-PAC score and their current ADL deficits, it is recommended that the patient have 3-5 sessions per week of Occupational Therapy at d/c to increase the patient's independence.  Please see assessment section for further patient specific details.    If patient discharges prior to next session this note will serve as a discharge summary.  Please see below for the latest assessment towards goals.     Continue to assess pending progress, 3-5 sessions per week  OT Equipment Recommendations  Equipment Needed: No       Patient Diagnosis(es): The primary encounter diagnosis was Altered mental status, unspecified altered mental status type. Diagnoses of Sepsis, due to unspecified organism, unspecified whether acute organ dysfunction present (HCC) and Influenza A were also pertinent to this visit.  Past Medical History:  has a past medical history of Arthritis, Back pain, Cancer (HCC), Cerebral artery occlusion with cerebral infarction (HCC), Diabetes, High blood pressure, Hyperlipidemia, Seizure (HCC), SVT (supraventricular tachycardia), and Type II or unspecified type diabetes mellitus without mention of complication, not stated as uncontrolled.  Past Surgical History:  has a past surgical history that includes back surgery; Carpal tunnel release; Hysterectomy; Tonsillectomy; lymph node biopsy (Left); Colonoscopy; Intracapsular cataract extraction (Right, 3/11/2019); Breast lumpectomy (Left); Intracapsular cataract  on and taking off regular lower body clothing?: A Lot  How much help is needed for bathing (which includes washing, rinsing, drying)?: A Lot  How much help is needed for toileting (which includes using toilet, bedpan, or urinal)?: A Lot  How much help is needed for putting on and taking off regular upper body clothing?: A Little  How much help is needed for taking care of personal grooming?: A Little  How much help for eating meals?: A Little  AM-[facility] Inpatient Daily Activity Raw Score: 15  AM-PAC Inpatient ADL T-Scale Score : 34.69  ADL Inpatient CMS 0-100% Score: 56.46  ADL Inpatient CMS G-Code Modifier : CK    Tinneti Score       Goals  Short Term Goals  Time Frame for Short Term Goals: prior to D/C  Short Term Goal 1: complete functional mobility and transfers Mod Ind  Short Term Goal 2: complete bathing and dressing Mod Ind  Short Term Goal 3: complete toileting Mod Ind  Short Term Goal 4: complete grooming in stance at sink Mod Ind  Short Term Goal 5: tolerate B UE exercises x10 reps for increased strength w/ ADLs  Long Term Goals  Time Frame for Long Term Goals : STG=LTG  Patient Goals   Patient goals : return to PLOF      Therapy Time   Individual Concurrent Group Co-treatment   Time In 0946         Time Out 1009         Minutes 23         Timed Code Treatment Minutes: 8 Minutes (15 minute eval)       SRINI Martinez/L

## 2025-02-27 NOTE — ED PROVIDER NOTES
placed on supplemental oxygen.  Inpatient service was consulted for admission.    Consults (none if blank):          Social Determinants : Patient lacks transportation:       Vitals Reviewed:    Vitals:    02/27/25 0615 02/27/25 0645 02/27/25 0657 02/27/25 0723   BP: 113/65 109/70 (!) 112/91    Pulse: 98 100 (!) 114    Resp: 22 22 22    Temp:    (!) 102.2 °F (39 °C)   TempSrc:    Rectal   SpO2: 95% 92% 95%    Weight:       Height:           The patient was seen and examined.The results of pertinent diagnostic studies and exam findings were discussed. The patient’s provisional diagnosis and plan of care were discussed with the patient (parents) who expressed a complete understanding. Any medications were reviewed and indications and risks of medications were discussed with the patient (parents).    Strict verbal and written return precautions, instructions and recommendation for appropriate follow-up were provided as well.    ED Medications administered this visit:  (None if blank)  Medications   ceFEPIme (MAXIPIME) 2,000 mg in sodium chloride 0.9 % 100 mL IVPB (addEASE) (0 mg IntraVENous Stopped 2/27/25 0604)   vancomycin (VANCOCIN) 1,500 mg in sodium chloride 0.9 % 250 mL (addEASE) IVPB (has no administration in time range)   oseltamivir (TAMIFLU) capsule 75 mg (has no administration in time range)   0.9 % sodium chloride infusion (30 mL/kg/hr × 76.9 kg IntraVENous New Bag 2/27/25 0331)   acetaminophen (TYLENOL) suppository 650 mg (650 mg Rectal Given 2/27/25 0317)         PROCEDURES: (None if blank)      CRITICAL CARE: (None if blank)  I personally saw the patient and independently provided 31 minutes of nonconcurrent critical care out of the total shared critical care time provided     This includes multiple reevaluations, vital sign monitoring, pulse oximetry monitoring, telemetry monitoring, clinical response to the IV medications, reviewing the nursing notes, consultation time, dictation/documentation time,

## 2025-02-27 NOTE — PROGRESS NOTES
4 Eyes Skin Assessment     NAME:  Regi Roche  YOB: 1945  MEDICAL RECORD NUMBER:  [id number]    The patient is being assessed for  Admission    I agree that at least one RN has performed a thorough Head to Toe Skin Assessment on the patient. ALL assessment sites listed below have been assessed.      Areas assessed by both nurses:    Head, Face, Ears, Shoulders, Back, Chest, Arms, Elbows, Hands, Sacrum. Buttock, Coccyx, Ischium, Legs. Feet and Heels, and Under Medical Devices     Patient has bruise on lower back         Does the Patient have a Wound? No noted wound(s)       Александр Prevention initiated by RN: Yes  Wound Care Orders initiated by RN: No    Pressure Injury (Stage 3,4, Unstageable, DTI, NWPT, and Complex wounds) if present, place Wound referral order by RN under : No    New Ostomies, if present place, Ostomy referral order under : No     Nurse 1 eSignature: Electronically signed by Sunita Torres RN on 2/27/25 at 12:25 PM EST    **SHARE this note so that the co-signing nurse can place an eSignature**    Nurse 2 eSignature: Electronically signed by Karyn Thomas RN on 2/27/25 at 5:35 PM EST

## 2025-02-27 NOTE — H&P
Hospital Medicine History & Physical      Date of Admission: 2/27/2025    Date of Service:  Pt seen/examined on 02/27/25     [x]Admitted to Inpatient with expected LOS greater than two midnights due to medical therapy.  []Placed in Observation status.    Chief Admission Complaint: Confusion    Presenting Admission History:      79 y.o. female with PMHx significant for old CVA with right-sided weakness, diabetes mellitus, hypertension, paroxysmal A-fib who presented to ED with a complaint of increased confusion.  Patient has been more confused than usual for the last couple of days.  Patient did have a fever at home 103 according to the daughter who was present at the bedside.  No sick contacts.  In ED patient was found to be febrile with temperature 102.9 °F.  Patient was also tachycardic.  Flu swab was positive for influenza A.  Patient was noted to be hypoxic requiring supplemental oxygen via nasal cannula.  Patient is currently on nasal cannula 15 L/min, patient is currently comfortable and denies any specific complaints at this time apart from some cough but she denies any shortness of breath..      ROS:     Confused about    Assessment:  Influenza A infection.  Acute respiratory failure with hypoxia.  Acute metabolic encephalopathy.  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  Type 2 diabetes mellitus.  Essential hypertension.  Paroxysmal A-fib on anticoagulation with Eliquis.  Elevated troponin    Plan:    Patient is admitted under inpatient status to PCU level of care.  Start Tamiflu.  Isolation per protocol.  Check procalcitonin level.  Patient was given IV vancomycin and cefepime, hold off for now, if calcitonin is positive would add IV antibiotics  Delirium precautions.  PT/OT evaluation and treatment when the patient is more stable.  Hold oral hypoglycemics.  Add Lantus and sliding scale insulin.  Continue Eliquis    Physical Exam Performed:      /87   Pulse 100   Temp (!) 102.9 °F (39.4 °C)

## 2025-02-27 NOTE — CONSULTS
Neurology Consult Note  Reason for Consult: aphasia    Chief complaint: felt ill    Issa Rogers MD asked me to see Regi Roche in consultation for evaluation of aphasia    History of Present Illness:  Regi Roche is a 79 y.o. female who presents with altered mental status.     I obtained my information via interview w/ the patient and her son at the bedside, supplemented by chart review.     The patient hadn't been feeling well in general for a few days.  She thought she had the flu.  She seemed to be doing a bit better recently though apparently was found early this morning on the ground confused.  She was sort of mumbling not making any sense.  She was subsequently transported to the ED to be evaluated.      CT head no acute intracranial abnormality.  Temperature has been as high as 102.9F.  BP OK.      A stroke alert was called this morning.  It was felt that the patient was having some word finding trouble.  No acute neurologic interventions were pursued.      Currently she is much better thought not quite back to baseline.     She has had stroke in 2019.  Per review of chart we did see this patient for an episode of aphasia in 2022 in the setting of a migraine.  Her migraines have not been an issue for her at all recently.      There is a PCP note from Oct of 2024 for some sort of seizure like activity.  She had LOC w/ \"violent shaking\" of UEs.  Lasted less than a minute.  Was confused and tired shortly after.  EEG in Oct was WNL.      Medical History:  Past Medical History:   Diagnosis Date    Arthritis     Back pain     Cancer (HCC)     breast, Left Lumpectomy and radiation      Cerebral artery occlusion with cerebral infarction (HCC)     Diabetes     High blood pressure     Hyperlipidemia     Seizure (HCC)     10 yrs ago     SVT (supraventricular tachycardia)     Type II or unspecified type diabetes mellitus without mention of complication, not stated as uncontrolled      Past Surgical  MG tablet, Take 1 tablet by mouth 2 times daily (with meals)  Calcium Carbonate-Vit D-Min (CALTRATE BONE HEALTH ADVANCED) 600-800 MG-UNIT CHEW, Take 1 tablet by mouth daily  furosemide (LASIX) 40 MG tablet, Take 0.5 tablets by mouth daily as needed (for leg swellinig)  alendronate (FOSAMAX) 70 MG tablet, Take 1 tablet by mouth every 7 days Fridays  potassium chloride (KLOR-CON M) 10 MEQ extended release tablet, Take 1 tablet by mouth daily Take with Furosemide  apixaban (ELIQUIS) 5 MG TABS tablet, Take 1 tablet by mouth 2 times daily  lisinopril (PRINIVIL;ZESTRIL) 20 MG tablet, Take 1 tablet by mouth daily  pantoprazole (PROTONIX) 40 MG tablet, Take 1 tablet by mouth at bedtime  QUEtiapine (SEROQUEL) 100 MG tablet, Take 1 tablet by mouth at bedtime  DULoxetine (CYMBALTA) 60 MG extended release capsule, Take 1 capsule by mouth 2 times daily    Allergies   Allergen Reactions    Lexapro [Escitalopram Oxalate] Other (See Comments)     Makes her suicidal    Wellbutrin [Bupropion] Other (See Comments)     Makes her suicidal     Family History   Problem Relation Age of Onset    Heart Disease Mother     Cancer Father      Social History     Tobacco Use   Smoking Status Former   Smokeless Tobacco Never   Tobacco Comments    quit 30 yrs ago     Social History     Substance and Sexual Activity   Drug Use No     Social History     Substance and Sexual Activity   Alcohol Use No     ROS  Constitutional- No weight loss or fevers  Eyes- No diplopia. No photophobia.  Ears/nose/throat- No dysphagia. No Dysarthria  Cardiovascular- No palpitations. No chest pain  Respiratory- No dyspnea. No Cough  Gastrointestinal- No Abdominal pain. No Vomiting.  Genitourinary- No incontinence. No urinary retention  Musculoskeletal- No myalgia. No arthralgia  Skin- No rash. No easy bruising.  Psychiatric- No depression. No anxiety  Endocrine- No diabetes. No thyroid issues.  Hematologic- No bleeding difficulty. No fatigue  Neurologic- No weakness. No

## 2025-02-27 NOTE — CARE COORDINATION
Case Management Assessment  Initial Evaluation    Date/Time of Evaluation: 2/27/2025 1:51 PM  Assessment Completed by: Shelly Kelly RN    If patient is discharged prior to next notation, then this note serves as note for discharge by case management.    Patient Name: Regi Roche                   YOB: 1945  Diagnosis: Influenza A [J10.1]  Altered mental status, unspecified altered mental status type [R41.82]  Sepsis, due to unspecified organism, unspecified whether acute organ dysfunction present (HCC) [A41.9]                   Date / Time: 2/27/2025  2:30 AM    Patient Admission Status: Inpatient   Readmission Risk (Low < 19, Mod (19-27), High > 27): Readmission Risk Score: 16.6    Current PCP: Moses Chery MD  PCP verified by CM? Yes    Chart Reviewed: Yes      History Provided by: Patient, Child/Family  Patient Orientation: Alert and Oriented    Patient Cognition: Alert    Hospitalization in the last 30 days (Readmission):  No    If yes, Readmission Assessment in  Navigator will be completed.    Advance Directives:      Code Status: Full Code   Patient's Primary Decision Maker is: Legal Next of Kin    Primary Decision Maker: WojciechSaqib  Child - [phone]    Secondary Decision Maker: Renita Jeffrey  Child - [phone]    Discharge Planning:    Patient lives with: Children Type of Home: House, Other (Comment) (condo, one step, railing)  Primary Care Giver: Self  Patient Support Systems include: Children, Family Members   Current Financial resources: Medicare  Current community resources: None  Current services prior to admission: Durable Medical Equipment            Current DME: Shower Chair, Walker            Type of Home Care services:  None    ADLS  Prior functional level: Assistance with the following:, Cooking, Housework, Shopping, Other (see comment) (med set up)  Current functional level: Assistance with the following:, Toileting, Mobility, Bathing    PT AM-PAC:    provided with a choice of provider and agrees with the discharge plan. Freedom of choice list with basic dialogue that supports the patient's individualized plan of care/goals and shares the quality data associated with the providers was provided to:     Patient Representative Name:       The Patient and/or Patient Representative Agree with the Discharge Plan?      Shelly Kelly RN  Case Management Department  Ph: [phone]

## 2025-02-27 NOTE — ACP (ADVANCE CARE PLANNING)
Advance Care Planning   Healthcare Decision Maker:    Primary Decision Maker: Saqib Jeffrey - Child - [phone]    Secondary Decision Maker: Renita Jeffrey - Child - [phone]    Today we documented Decision Maker(s) consistent with Legal Next of Kin hierarchy.     Electronically signed by Shelly Kelly RN on 2/27/2025 at 1:46 PM

## 2025-02-27 NOTE — SIGNIFICANT EVENT
Code stroke called  Reported immediately to bedside  Patient admitted with respiratory failure influenza and some encephalopathy  Son at bedside helped with history taking  Apparently patient was having aphasia this morning to note that patient reported to be lethargic last night.  Patient still confused but denies chest pain shortness of breath nausea or vomiting able to follow commands    Physical exam  Vitals reviewed  Lungs clear to auscultation  S1-S2 regular  Abdomen soft  Awake and confused  No obvious motor weakness  No facial droop  Decreased sensation on right side  Aphasia symptoms    Assessment and plan  Acute encephalopathy likely multifactorial with possible acute CVA, differential includes  worsening of previous stroke, CT head, CTA head and neck ordered.  Telemetry, aspirin, statin  Called and discussed with  stroke team recommend against TNK at this time will follow further recommendation  Will consult neurology  Discussed with primary hospitalist will follow-up on results  Influenza continue same management  Acute respite failure due to influenza oxygen  Updated son at bedside  Discussed with nursing    Total critical care time including but not limited to responding and managing code stroke, ordering and following on critical labs imaging discussion with other subspecialty in a patient with potential life-threatening complications and not including any procedure time 33 minutes

## 2025-02-27 NOTE — PROGRESS NOTES
EDSBAR report has been reviewed.      Electronically signed by Sunita Torres RN on 2/27/2025 at 7:46 AM

## 2025-02-27 NOTE — PLAN OF CARE
Problem: Skin/Tissue Integrity  Goal: Skin integrity remains intact  Description: 1.  Monitor for areas of redness and/or skin breakdown  2.  Assess vascular access sites hourly  3.  Every 4-6 hours minimum:  Change oxygen saturation probe site  4.  Every 4-6 hours:  If on nasal continuous positive airway pressure, respiratory therapy assess nares and determine need for appliance change or resting period  Outcome: Progressing     Problem: Discharge Planning  Goal: Discharge to home or other facility with appropriate resources  Outcome: Progressing     Problem: Chronic Conditions and Co-morbidities  Goal: Patient's chronic conditions and co-morbidity symptoms are monitored and maintained or improved  Outcome: Progressing     Problem: Chronic Conditions and Co-morbidities  Goal: Patient's chronic conditions and co-morbidity symptoms are monitored and maintained or improved  Outcome: Adequate for Discharge     Problem: Discharge Planning  Goal: Discharge to home or other facility with appropriate resources  Outcome: Adequate for Discharge     Problem: Pain  Goal: Verbalizes/displays adequate comfort level or baseline comfort level  Outcome: Adequate for Discharge     Problem: Skin/Tissue Integrity  Goal: Skin integrity remains intact  Description: 1.  Monitor for areas of redness and/or skin breakdown  2.  Assess vascular access sites hourly  3.  Every 4-6 hours minimum:  Change oxygen saturation probe site  4.  Every 4-6 hours:  If on nasal continuous positive airway pressure, respiratory therapy assess nares and determine need for appliance change or resting period  Outcome: Adequate for Discharge

## 2025-02-27 NOTE — PROGRESS NOTES
Patient transferred to room 5258. Patient attached to wall monitor and verified with CMU. Patient oriented to room and call light. Family at bedside. She does not voice any questions or concerns at this time. Bed alarm on for safety.

## 2025-02-27 NOTE — CONSULTS
Clinical Pharmacy Note  Vancomycin Consult    Pharmacy consult received for one-time dose of vancomycin in the Emergency Department per Dr. Trevino.    Ht Readings from Last 1 Encounters:   02/27/25 1.676 m (5' 6\")        Wt Readings from Last 1 Encounters:   02/27/25 76.9 kg (169 lb 8.5 oz)         Assessment/Plan:  Vancomycin 1500mg x 1 in ED.  If vancomycin is to continue on admission and pharmacy is to manage dosing, please re-consult with admission orders.    Monica Arellano, LisaD

## 2025-02-27 NOTE — ED NOTES
Pt transported to [facility] via stretcher, accompanied by RN and EDT.

## 2025-02-27 NOTE — PROGRESS NOTES
Pt. Seen and examined earlier today by our group. I have seen and examined the patient,  reviewed the History and Physical, and agree with the current plan of care. We will continue to follow this patient during this hospitalization.    - I followed up on imaging obtained during the Code Stroke  - Hypotensive so ordered a 500 ml NS bolus and Midodrine      Issa Fernandez MD

## 2025-02-28 ENCOUNTER — APPOINTMENT (OUTPATIENT)
Dept: MRI IMAGING | Age: 80
End: 2025-02-28
Payer: MEDICARE

## 2025-02-28 LAB
ANION GAP SERPL CALCULATED.3IONS-SCNC: 7 MMOL/L (ref 3–16)
BACTERIA BLD CULT ORG #2: NORMAL
BACTERIA BLD CULT: NORMAL
BUN SERPL-MCNC: 12 MG/DL (ref 7–20)
CALCIUM SERPL-MCNC: 8.6 MG/DL (ref 8.3–10.6)
CHLORIDE SERPL-SCNC: 105 MMOL/L (ref 99–110)
CHOLEST SERPL-MCNC: 140 MG/DL (ref 0–199)
CO2 SERPL-SCNC: 21 MMOL/L (ref 21–32)
CREAT SERPL-MCNC: 0.6 MG/DL (ref 0.6–1.2)
DEPRECATED RDW RBC AUTO: 14.6 % (ref 12.4–15.4)
ECHO AO ROOT DIAM: 3 CM
ECHO AO ROOT INDEX: 1.61 CM/M2
ECHO AV AREA PEAK VELOCITY: 1 CM2
ECHO AV AREA VTI: 1.1 CM2
ECHO AV AREA/BSA PEAK VELOCITY: 0.5 CM2/M2
ECHO AV AREA/BSA VTI: 0.6 CM2/M2
ECHO AV MEAN GRADIENT: 11 MMHG
ECHO AV MEAN VELOCITY: 1.5 M/S
ECHO AV PEAK GRADIENT: 21 MMHG
ECHO AV PEAK VELOCITY: 2.3 M/S
ECHO AV VELOCITY RATIO: 0.43
ECHO AV VTI: 42.3 CM
ECHO BSA: 1.89 M2
ECHO EST RA PRESSURE: 3 MMHG
ECHO LA AREA 2C: 13.1 CM2
ECHO LA AREA 4C: 15.9 CM2
ECHO LA DIAMETER INDEX: 1.83 CM/M2
ECHO LA DIAMETER: 3.4 CM
ECHO LA MAJOR AXIS: 5.7 CM
ECHO LA MINOR AXIS: 5.2 CM
ECHO LA TO AORTIC ROOT RATIO: 1.13
ECHO LA VOL BP: 32 ML (ref 22–52)
ECHO LA VOL MOD A2C: 27 ML (ref 22–52)
ECHO LA VOL MOD A4C: 34 ML (ref 22–52)
ECHO LA VOL/BSA BIPLANE: 17 ML/M2 (ref 16–34)
ECHO LA VOLUME INDEX MOD A2C: 15 ML/M2 (ref 16–34)
ECHO LA VOLUME INDEX MOD A4C: 18 ML/M2 (ref 16–34)
ECHO LV E' LATERAL VELOCITY: 7.62 CM/S
ECHO LV E' SEPTAL VELOCITY: 7.62 CM/S
ECHO LV EDV A4C: 47 ML
ECHO LV EDV INDEX A4C: 25 ML/M2
ECHO LV EF PHYSICIAN: 40 %
ECHO LV EJECTION FRACTION A4C: 36 %
ECHO LV ESV A4C: 31 ML
ECHO LV ESV INDEX A4C: 17 ML/M2
ECHO LV FRACTIONAL SHORTENING: 25 % (ref 28–44)
ECHO LV INTERNAL DIMENSION DIASTOLE INDEX: 2.85 CM/M2
ECHO LV INTERNAL DIMENSION DIASTOLIC: 5.3 CM (ref 3.9–5.3)
ECHO LV INTERNAL DIMENSION SYSTOLIC INDEX: 2.15 CM/M2
ECHO LV INTERNAL DIMENSION SYSTOLIC: 4 CM
ECHO LV IVSD: 1.1 CM (ref 0.6–0.9)
ECHO LV MASS 2D: 213.9 G (ref 67–162)
ECHO LV MASS INDEX 2D: 115 G/M2 (ref 43–95)
ECHO LV POSTERIOR WALL DIASTOLIC: 1 CM (ref 0.6–0.9)
ECHO LV RELATIVE WALL THICKNESS RATIO: 0.38
ECHO LVOT AREA: 2.5 CM2
ECHO LVOT AV VTI INDEX: 0.45
ECHO LVOT DIAM: 1.8 CM
ECHO LVOT MEAN GRADIENT: 2 MMHG
ECHO LVOT PEAK GRADIENT: 4 MMHG
ECHO LVOT PEAK VELOCITY: 1 M/S
ECHO LVOT STROKE VOLUME INDEX: 26.1 ML/M2
ECHO LVOT SV: 48.6 ML
ECHO LVOT VTI: 19.1 CM
ECHO MV A VELOCITY: 0.86 M/S
ECHO MV AREA VTI: 1.7 CM2
ECHO MV E DECELERATION TIME (DT): 214 MS
ECHO MV E VELOCITY: 1.04 M/S
ECHO MV E/A RATIO: 1.21
ECHO MV E/E' LATERAL: 13.65
ECHO MV E/E' RATIO (AVERAGED): 13.65
ECHO MV E/E' SEPTAL: 13.65
ECHO MV LVOT VTI INDEX: 1.51
ECHO MV MAX VELOCITY: 1 M/S
ECHO MV MEAN GRADIENT: 2 MMHG
ECHO MV MEAN VELOCITY: 0.7 M/S
ECHO MV PEAK GRADIENT: 4 MMHG
ECHO MV REGURGITANT PEAK GRADIENT: 67 MMHG
ECHO MV REGURGITANT PEAK VELOCITY: 4.1 M/S
ECHO MV VTI: 28.9 CM
ECHO PULMONARY ARTERY END DIASTOLIC PRESSURE: 16 MMHG
ECHO PV ACCELERATION TIME (AT): 73 MS
ECHO PV MAX VELOCITY: 1.3 M/S
ECHO PV MAX VELOCITY: 2 M/S
ECHO PV PEAK GRADIENT: 6 MMHG
ECHO RA AREA 4C: 12.9 CM2
ECHO RA END SYSTOLIC VOLUME APICAL 4 CHAMBER INDEX BSA: 16 ML/M2
ECHO RA VOLUME: 29 ML
ECHO RIGHT VENTRICULAR SYSTOLIC PRESSURE (RVSP): 28 MMHG
ECHO RV FREE WALL PEAK S': 9.7 CM/S
ECHO RV INTERNAL DIMENSION: 1.9 CM
ECHO RV TAPSE: 2.1 CM (ref 1.7–?)
ECHO TV E WAVE: 0.7 M/S
ECHO TV PEAK GRADIENT: 1 MMHG
ECHO TV REGURGITANT MAX VELOCITY: 2.5 M/S
ECHO TV REGURGITANT PEAK GRADIENT: 25 MMHG
EST. AVERAGE GLUCOSE BLD GHB EST-MCNC: 128.4 MG/DL
GFR SERPLBLD CREATININE-BSD FMLA CKD-EPI: >90 ML/MIN/{1.73_M2}
GLUCOSE BLD-MCNC: 111 MG/DL (ref 70–99)
GLUCOSE BLD-MCNC: 124 MG/DL (ref 70–99)
GLUCOSE BLD-MCNC: 141 MG/DL (ref 70–99)
GLUCOSE BLD-MCNC: 162 MG/DL (ref 70–99)
GLUCOSE SERPL-MCNC: 122 MG/DL (ref 70–99)
HBA1C MFR BLD: 6.1 %
HCT VFR BLD AUTO: 32.2 % (ref 36–48)
HDLC SERPL-MCNC: 41 MG/DL (ref 40–60)
HGB BLD-MCNC: 10.9 G/DL (ref 12–16)
LDLC SERPL CALC-MCNC: 79 MG/DL
MCH RBC QN AUTO: 31.6 PG (ref 26–34)
MCHC RBC AUTO-ENTMCNC: 33.9 G/DL (ref 31–36)
MCV RBC AUTO: 93.1 FL (ref 80–100)
MRSA DNA SPEC QL NAA+PROBE: NORMAL
PERFORMED ON: ABNORMAL
PLATELET # BLD AUTO: 196 K/UL (ref 135–450)
PMV BLD AUTO: 8.1 FL (ref 5–10.5)
POTASSIUM SERPL-SCNC: 3.7 MMOL/L (ref 3.5–5.1)
RBC # BLD AUTO: 3.46 M/UL (ref 4–5.2)
SODIUM SERPL-SCNC: 133 MMOL/L (ref 136–145)
TRIGL SERPL-MCNC: 100 MG/DL (ref 0–150)
VLDLC SERPL CALC-MCNC: 20 MG/DL
WBC # BLD AUTO: 8.7 K/UL (ref 4–11)

## 2025-02-28 PROCEDURE — 2500000003 HC RX 250 WO HCPCS: Performed by: INTERNAL MEDICINE

## 2025-02-28 PROCEDURE — 6370000000 HC RX 637 (ALT 250 FOR IP): Performed by: INTERNAL MEDICINE

## 2025-02-28 PROCEDURE — 2580000003 HC RX 258: Performed by: INTERNAL MEDICINE

## 2025-02-28 PROCEDURE — 80048 BASIC METABOLIC PNL TOTAL CA: CPT

## 2025-02-28 PROCEDURE — 97129 THER IVNTJ 1ST 15 MIN: CPT

## 2025-02-28 PROCEDURE — 83036 HEMOGLOBIN GLYCOSYLATED A1C: CPT

## 2025-02-28 PROCEDURE — 36415 COLL VENOUS BLD VENIPUNCTURE: CPT

## 2025-02-28 PROCEDURE — 92507 TX SP LANG VOICE COMM INDIV: CPT

## 2025-02-28 PROCEDURE — 6360000002 HC RX W HCPCS: Performed by: INTERNAL MEDICINE

## 2025-02-28 PROCEDURE — 97530 THERAPEUTIC ACTIVITIES: CPT

## 2025-02-28 PROCEDURE — 70551 MRI BRAIN STEM W/O DYE: CPT

## 2025-02-28 PROCEDURE — 94761 N-INVAS EAR/PLS OXIMETRY MLT: CPT

## 2025-02-28 PROCEDURE — 2700000000 HC OXYGEN THERAPY PER DAY

## 2025-02-28 PROCEDURE — 85027 COMPLETE CBC AUTOMATED: CPT

## 2025-02-28 PROCEDURE — 80061 LIPID PANEL: CPT

## 2025-02-28 PROCEDURE — 97535 SELF CARE MNGMENT TRAINING: CPT

## 2025-02-28 PROCEDURE — 95819 EEG AWAKE AND ASLEEP: CPT

## 2025-02-28 PROCEDURE — 2060000000 HC ICU INTERMEDIATE R&B

## 2025-02-28 PROCEDURE — 93306 TTE W/DOPPLER COMPLETE: CPT | Performed by: INTERNAL MEDICINE

## 2025-02-28 PROCEDURE — 97162 PT EVAL MOD COMPLEX 30 MIN: CPT

## 2025-02-28 PROCEDURE — 97116 GAIT TRAINING THERAPY: CPT

## 2025-02-28 RX ORDER — BENZONATATE 200 MG/1
200 CAPSULE ORAL 2 TIMES DAILY PRN
Status: DISCONTINUED | OUTPATIENT
Start: 2025-02-28 | End: 2025-03-01 | Stop reason: HOSPADM

## 2025-02-28 RX ORDER — OSELTAMIVIR PHOSPHATE 75 MG/1
75 CAPSULE ORAL 2 TIMES DAILY
Qty: 5 CAPSULE | Refills: 0 | Status: SHIPPED | OUTPATIENT
Start: 2025-02-28 | End: 2025-03-03

## 2025-02-28 RX ORDER — ATORVASTATIN CALCIUM 80 MG/1
80 TABLET, FILM COATED ORAL NIGHTLY
Qty: 30 TABLET | Refills: 3 | Status: SHIPPED | OUTPATIENT
Start: 2025-02-28

## 2025-02-28 RX ORDER — ASPIRIN 81 MG/1
81 TABLET, CHEWABLE ORAL DAILY
Qty: 30 TABLET | Refills: 3 | Status: SHIPPED | OUTPATIENT
Start: 2025-03-01

## 2025-02-28 RX ADMIN — ATORVASTATIN CALCIUM 80 MG: 80 TABLET, FILM COATED ORAL at 21:09

## 2025-02-28 RX ADMIN — SODIUM CHLORIDE, PRESERVATIVE FREE 10 ML: 5 INJECTION INTRAVENOUS at 21:14

## 2025-02-28 RX ADMIN — DULOXETINE HYDROCHLORIDE 60 MG: 60 CAPSULE, DELAYED RELEASE ORAL at 08:44

## 2025-02-28 RX ADMIN — OSELTAMIVIR PHOSPHATE 75 MG: 75 CAPSULE ORAL at 08:53

## 2025-02-28 RX ADMIN — SODIUM CHLORIDE, PRESERVATIVE FREE 10 ML: 5 INJECTION INTRAVENOUS at 08:44

## 2025-02-28 RX ADMIN — APIXABAN 5 MG: 5 TABLET, FILM COATED ORAL at 21:09

## 2025-02-28 RX ADMIN — OSELTAMIVIR PHOSPHATE 75 MG: 75 CAPSULE ORAL at 21:09

## 2025-02-28 RX ADMIN — MIDODRINE HYDROCHLORIDE 5 MG: 5 TABLET ORAL at 16:23

## 2025-02-28 RX ADMIN — DULOXETINE HYDROCHLORIDE 60 MG: 60 CAPSULE, DELAYED RELEASE ORAL at 21:09

## 2025-02-28 RX ADMIN — SODIUM CHLORIDE: 0.9 INJECTION, SOLUTION INTRAVENOUS at 02:42

## 2025-02-28 RX ADMIN — SODIUM CHLORIDE: 0.9 INJECTION, SOLUTION INTRAVENOUS at 16:30

## 2025-02-28 RX ADMIN — METOPROLOL TARTRATE 50 MG: 50 TABLET, FILM COATED ORAL at 21:09

## 2025-02-28 RX ADMIN — CEFEPIME 2000 MG: 2 INJECTION, POWDER, FOR SOLUTION INTRAVENOUS at 06:54

## 2025-02-28 RX ADMIN — INSULIN GLARGINE 10 UNITS: 100 INJECTION, SOLUTION SUBCUTANEOUS at 08:44

## 2025-02-28 RX ADMIN — CALCIUM 500 MG: 500 TABLET ORAL at 08:43

## 2025-02-28 RX ADMIN — LISINOPRIL 20 MG: 10 TABLET ORAL at 08:44

## 2025-02-28 RX ADMIN — CEFEPIME 2000 MG: 2 INJECTION, POWDER, FOR SOLUTION INTRAVENOUS at 23:23

## 2025-02-28 RX ADMIN — PANTOPRAZOLE SODIUM 40 MG: 40 TABLET, DELAYED RELEASE ORAL at 21:09

## 2025-02-28 RX ADMIN — ASPIRIN 81 MG: 81 TABLET, CHEWABLE ORAL at 08:43

## 2025-02-28 RX ADMIN — BENZONATATE 200 MG: 200 CAPSULE ORAL at 18:15

## 2025-02-28 RX ADMIN — METOPROLOL TARTRATE 50 MG: 50 TABLET, FILM COATED ORAL at 08:44

## 2025-02-28 RX ADMIN — SODIUM CHLORIDE, PRESERVATIVE FREE 10 ML: 5 INJECTION INTRAVENOUS at 21:10

## 2025-02-28 RX ADMIN — CEFEPIME 2000 MG: 2 INJECTION, POWDER, FOR SOLUTION INTRAVENOUS at 16:31

## 2025-02-28 RX ADMIN — ALPRAZOLAM 1 MG: 0.5 TABLET ORAL at 21:40

## 2025-02-28 RX ADMIN — BENZONATATE 200 MG: 200 CAPSULE ORAL at 10:36

## 2025-02-28 RX ADMIN — MIDODRINE HYDROCHLORIDE 5 MG: 5 TABLET ORAL at 08:45

## 2025-02-28 RX ADMIN — VANCOMYCIN HYDROCHLORIDE 1000 MG: 1 INJECTION, POWDER, LYOPHILIZED, FOR SOLUTION INTRAVENOUS at 02:49

## 2025-02-28 RX ADMIN — POTASSIUM CHLORIDE 10 MEQ: 1500 TABLET, EXTENDED RELEASE ORAL at 08:43

## 2025-02-28 ASSESSMENT — PAIN SCALES - GENERAL: PAINLEVEL_OUTOF10: 0

## 2025-02-28 NOTE — PROGRESS NOTES
CLINICAL PHARMACY NOTE: MEDS TO BEDS    Discharge medications are ready in inpatient pharmacy for weekend delivery.    02/28/25 12:55 PM

## 2025-02-28 NOTE — PROGRESS NOTES
Facility/Department: [address] PROGRESSIVE CARE   DYSPHAGIA THERAPY and SPEECH / LANGUAGE / COGNITIVE-LINGUISTIC TREATMENT    Patient: Regi Roche   : 1945   MRN: [id number]      Treatment Date: 2025      Admitting Dx:   Influenza A [J10.1]  Altered mental status, unspecified altered mental status type [R41.82]  Sepsis, due to unspecified organism, unspecified whether acute organ dysfunction present (HCC) [A41.9]   has a past medical history of Arthritis, Back pain, Cancer (HCC), Cerebral artery occlusion with cerebral infarction (HCC), Diabetes, High blood pressure, Hyperlipidemia, Seizure (HCC), SVT (supraventricular tachycardia), and Type II or unspecified type diabetes mellitus without mention of complication, not stated as uncontrolled.   has a past surgical history that includes back surgery; Carpal tunnel release; Hysterectomy; Tonsillectomy; lymph node biopsy (Left); Colonoscopy; Intracapsular cataract extraction (Right, 3/11/2019); Breast lumpectomy (Left); Intracapsular cataract extraction (Left, 3/25/2019); Hip fracture surgery (Right, 3/18/2020); and ep device procedure (N/A, 2024).  Allergies: allergy list reviewed; no food allergies identified  Speech Therapy History: 2019 following a CVA  Dysphagia History including instrumental assessment: 2019; regular diet with thin liquid consistencies  Baseline: regular diet History/Prior Level of Function: Living Status: Lives with dtr; per pt/dtr : dtr completes all home/finance management; and driving.                       Onset: 2025     SLP Diagnosis: Communication/Cognitive status change ; neuro work up; DX of influenza this admit. Concern for potential metabolic encephalopathy  Dysphagia Diagnosis: Oropharyngeal dysphagia      CURRENT ENCOUNTER DIAGNOSTICS/COURSE OF ADMISSION     H&P:   H&P: 79 y.o. female with PMHx significant for old CVA with right-sided weakness, diabetes mellitus, hypertension, paroxysmal A-fib  indicated above.    EDUCATION     Provided education regarding role of SLP, results of assessment, recommendations and general speech pathology plan of care.   Patient Response: Pt verbalized understanding and agreement , Needs reinforcement  Family education: Ed completed on 2/28/2025; voiced understanding  Staff education: NSG briefly    If patient discharges prior to next visit, this note will serve as discharge.     Time code minutes:  15  Total Time:  30    Electronically signed by:    Devorah Berumen MS,CCC,SLP [id number]  Speech and Language Pathologist  02/28/25 4:22 PM

## 2025-02-28 NOTE — PROGRESS NOTES
Hospitalist   Progress Note    Patient Name: Regi Roche  PCP: Moses Chery MD  Date of Admission: 2/27/2025    Chief Complaint on Admission: Altered Mental Status.Ill for last few days, per daughter, EMS states that she has been waking up in the night with fevers and increased confusion. Prior CVA history with residual right sided deficit  Chief diagnosis after evaluation: Influenza A and associated sepsis, acute hypoxic respiratory failure and acute metabolic encephalopathy    Brief Synopsis: Patient is a 79 y.o. woman who has a past medical history of Arthritis, Back pain, Cancer (HCC), Cerebral artery occlusion with cerebral infarction (HCC), Diabetes, High blood pressure, Hyperlipidemia, Seizure (HCC), SVT (supraventricular tachycardia), and Type II or unspecified type diabetes mellitus without mention of complication, not stated as uncontrolled. who was admitted on 2/27/2025 for evaluation and treatment of Influenza A and associated sepsis, acute hypoxic respiratory failure and acute metabolic encephalopathy    Pt Seen/Examined and Chart Reviewed.     Subjective: Pt is feeling better and has no new complaints. She hopes to be discharged soon.     Objective:  Allergies  Lexapro [escitalopram oxalate] and Wellbutrin [bupropion]    Medications    Scheduled Meds:   [Held by provider] apixaban  5 mg Oral BID    calcium elemental  500 mg Oral Daily with breakfast    DULoxetine  60 mg Oral BID    lisinopril  20 mg Oral Daily    metoprolol tartrate  50 mg Oral BID    pantoprazole  40 mg Oral Nightly    potassium chloride  10 mEq Oral Daily    sodium chloride flush  5-40 mL IntraVENous 2 times per day    insulin glargine  10 Units SubCUTAneous Daily    insulin lispro  0-4 Units SubCUTAneous 4x Daily AC & HS    oseltamivir  75 mg Oral BID    sodium chloride flush  5-40 mL IntraVENous 2 times per day    atorvastatin  80 mg Oral Nightly    aspirin  81 mg Oral Daily    Or    aspirin  300 mg Rectal Daily

## 2025-02-28 NOTE — PROGRESS NOTES
Occupational Therapy  Facility/Department: [address] PROGRESSIVE CARE  Occupational Therapy Treatment and Tentative D/C      Name: Regi Roche  : 1945  MRN: [id number]  Date of Service: 2025    Staff assist recommendation: [id number]      Discharge Recommendations: Regi Roche scored a 19/24 on the AM-PAC ADL Inpatient form. Current research shows that an AM-PAC score of 18 or greater is typically associated with a discharge to the patient's home setting. Based on the patient's AM-PAC score, and their current ADL deficits, it is recommended that the patient have 2-3 sessions per week of Occupational Therapy at d/c to increase the patient's independence.  At this time, this patient demonstrates the endurance and safety to discharge home with HHOT and a follow up treatment frequency of 2-3x/wk.   Please see assessment section for further patient specific details.    If patient discharges prior to next session this note will serve as a discharge summary.  Please see below for the latest assessment towards goals.     2-3 sessions per week, Home with assist PRN  OT Equipment Recommendations  Equipment Needed: No       Patient Diagnosis(es): The primary encounter diagnosis was Altered mental status, unspecified altered mental status type. Diagnoses of Sepsis, due to unspecified organism, unspecified whether acute organ dysfunction present (HCC) and Influenza A were also pertinent to this visit.  Past Medical History:  has a past medical history of Arthritis, Back pain, Cancer (HCC), Cerebral artery occlusion with cerebral infarction (HCC), Diabetes, High blood pressure, Hyperlipidemia, Seizure (HCC), SVT (supraventricular tachycardia), and Type II or unspecified type diabetes mellitus without mention of complication, not stated as uncontrolled.  Past Surgical History:  has a past surgical history that includes back surgery; Carpal tunnel release; Hysterectomy; Tonsillectomy; lymph node biopsy (Left);

## 2025-02-28 NOTE — PROGRESS NOTES
Neurology Progress Note    Updates  Says she is feeling 100%.   Still had a cough.     Medical History:  Past Medical History:   Diagnosis Date    Arthritis     Back pain     Cancer (HCC)     breast, Left Lumpectomy and radiation      Cerebral artery occlusion with cerebral infarction (HCC)     Diabetes     High blood pressure     Hyperlipidemia     Seizure (HCC)     10 yrs ago     SVT (supraventricular tachycardia)     Type II or unspecified type diabetes mellitus without mention of complication, not stated as uncontrolled      Past Surgical History:   Procedure Laterality Date    BACK SURGERY      BREAST LUMPECTOMY Left     CARPAL TUNNEL RELEASE      left    COLONOSCOPY      EP DEVICE PROCEDURE N/A 12/12/2024    Loop recorder removal performed by Felix Palacios MD at [facility] CARDIAC CATH LAB    HIP FRACTURE SURGERY Right 3/18/2020    RIGHT HIP GAMMA NAIL performed by Moses Brown MD at [facility] OR    HYSTERECTOMY (CERVIX STATUS UNKNOWN)      INTRACAPSULAR CATARACT EXTRACTION Right 3/11/2019    PHACOEMULSIFICATION WITH INTRAOCULAR LENS IMPLANT  performed by Jay Anand MD at [facility]    INTRACAPSULAR CATARACT EXTRACTION Left 3/25/2019    PHACOEMULSIFICATION WITH INTRAOCULAR LENS IMPLANT  performed by Jay Anand MD at [facility]    LYMPH NODE BIOPSY Left     TONSILLECTOMY       Scheduled Meds:   apixaban  5 mg Oral BID    calcium elemental  500 mg Oral Daily with breakfast    DULoxetine  60 mg Oral BID    lisinopril  20 mg Oral Daily    metoprolol tartrate  50 mg Oral BID    pantoprazole  40 mg Oral Nightly    potassium chloride  10 mEq Oral Daily    sodium chloride flush  5-40 mL IntraVENous 2 times per day    insulin glargine  10 Units SubCUTAneous Daily    insulin lispro  0-4 Units SubCUTAneous 4x Daily AC & HS    oseltamivir  75 mg Oral BID    sodium chloride flush  5-40 mL IntraVENous 2 times per day    atorvastatin  80 mg Oral Nightly    aspirin  81 mg Oral Daily    Or     aspirin  300 mg Rectal Daily    cefepime  2,000 mg IntraVENous q8h    midodrine  5 mg Oral TID      Medications Prior to Admission:   calcium carbonate (OSCAL) 500 MG TABS tablet, Take 1 tablet by mouth daily  [DISCONTINUED] pioglitazone (ACTOS) 45 MG tablet, Take 1 tablet by mouth daily  metoprolol tartrate (LOPRESSOR) 50 MG tablet, Take 1 tablet by mouth 2 times daily  ALPRAZolam (XANAX) 1 MG tablet, Take 1 tablet by mouth 2 times daily as needed.  metFORMIN (GLUCOPHAGE) 1000 MG tablet, Take 1 tablet by mouth 2 times daily (with meals)  Calcium Carbonate-Vit D-Min (CALTRATE BONE HEALTH ADVANCED) 600-800 MG-UNIT CHEW, Take 1 tablet by mouth daily  furosemide (LASIX) 40 MG tablet, Take 0.5 tablets by mouth daily as needed (for leg swellinig)  alendronate (FOSAMAX) 70 MG tablet, Take 1 tablet by mouth every 7 days Fridays  potassium chloride (KLOR-CON M) 10 MEQ extended release tablet, Take 1 tablet by mouth daily Take with Furosemide  apixaban (ELIQUIS) 5 MG TABS tablet, Take 1 tablet by mouth 2 times daily  lisinopril (PRINIVIL;ZESTRIL) 20 MG tablet, Take 1 tablet by mouth daily  pantoprazole (PROTONIX) 40 MG tablet, Take 1 tablet by mouth at bedtime  QUEtiapine (SEROQUEL) 100 MG tablet, Take 1 tablet by mouth at bedtime  DULoxetine (CYMBALTA) 60 MG extended release capsule, Take 1 capsule by mouth 2 times daily    Allergies   Allergen Reactions    Lexapro [Escitalopram Oxalate] Other (See Comments)     Makes her suicidal    Wellbutrin [Bupropion] Other (See Comments)     Makes her suicidal     Family History   Problem Relation Age of Onset    Heart Disease Mother     Cancer Father      Social History     Tobacco Use   Smoking Status Former   Smokeless Tobacco Never   Tobacco Comments    quit 30 yrs ago     Social History     Substance and Sexual Activity   Drug Use No     Social History     Substance and Sexual Activity   Alcohol Use No     ROS  Constitutional- No weight loss or fevers  Eyes- No diplopia. No  No

## 2025-02-28 NOTE — PROGRESS NOTES
Physical Therapy  Facility/Department: [address] PROGRESSIVE CARE  Physical Therapy Initial Assessment    Name: Regi Roche  : 1945  MRN: [id number]  Date of Service: 2025    Discharge Recommendations:  Continue to assess pending progress, Home with Home health PT   PT Equipment Recommendations  Other: Has Cane, Rolling Walker and Rollator.      Regi Roche scored a 18/24 on the AM-PAC short mobility form. Current research shows that an AM-PAC score of 18 or greater is typically associated with a discharge to the patient's home setting. Based on the patient's AM-PAC score and their current functional mobility deficits, it is recommended that the patient have 2-3 sessions per week of Physical Therapy at d/c to increase the patient's independence.  At this time, this patient demonstrates the endurance and safety to discharge home with Home PT Evaluation and a follow up treatment frequency of 2-3x/wk.  Please see assessment section for further patient specific details.    If patient discharges prior to next session this note will serve as a discharge summary.  Please see below for the latest assessment towards goals.       Assessment  Body Structures, Functions, Activity Limitations Requiring Skilled Therapeutic Intervention: Decreased functional mobility ;Decreased strength;Decreased safe awareness;Decreased endurance;Decreased balance  Assessment: 78 y/o female admit 2025 with Acute Respiratory, PAF, + Influenza, Confusion. CT Head : negative. PMH as noted including CVA (R residual weak), PAF, Breast Ca, Back Surg, R Hip Fx/Surg.  PTA pt living with dtr in 1 level condo with 1 step to enter; reports independent daily care and functional mobility (uses Rollator; several recent falls), no supplemental O2 pta.  Pt currently requiring 3L; able to transfer/amb short distances with Walker within hospital room setting with CGA.  Cues for safe Walker manage although no specific LOB.  Advised pt to use  functional  Strength: Generally decreased, functional                      Bed mobility  Supine to Sit: Contact guard assistance (HOB elevated.  Use of bedrail.)  Transfers  Sit to Stand: Contact guard assistance  Stand to Sit: Contact guard assistance  Comment: Transfers completed with Walker from eob, recliner and toilet; cues for safe positioning/hand placement.  Ambulation  Surface: Level tile  Device: Rolling Walker  Distance: Pt amb 4-5 steps eob to recliner, additional 25' x 2 with Walker CGA. No LE bucking/giving way; cues to remain close/safe distances and within perimeter of Walker.  No specific LOB.  Sats remain at least 90%.     Balance  Comments: Pt able to complete pericare while sitting on toilet.  Declined need for depends.          AM-PAC - Mobility    AM-PAC Basic Mobility - Inpatient   How much help is needed turning from your back to your side while in a flat bed without using bedrails?: None  How much help is needed moving from lying on your back to sitting on the side of a flat bed without using bedrails?: A Little  How much help is needed moving to and from a bed to a chair?: A Little  How much help is needed standing up from a chair using your arms?: A Little  How much help is needed walking in hospital room?: A Little  How much help is needed climbing 3-5 steps with a railing?: A Lot  AM-PAC Inpatient Mobility Raw Score : 18  AM-PAC Inpatient T-Scale Score : 43.63  Mobility Inpatient CMS 0-100% Score: 46.58  Mobility Inpatient CMS G-Code Modifier : CK           Goals  Short Term Goals  Time Frame for Short Term Goals: Upon d/c acute care setting.  Short Term Goal 1: Bed Mob Supervision.  Short Term Goal 2: Transfers with assist device SBA/Supervision.  Short Term Goal 3: Amb with assist device ' SBA  Short Term Goal 4: Pt participating in approp Strength Exs.  Patient Goals   Patient Goals : Return home with family.       Education  Patient Education  Education Given To:

## 2025-02-28 NOTE — CARE COORDINATION
Home care was recommended per PT/ OT. Patient has used [facility] in the past and wanted to use them again. Referral to [facility] was placed.     Daughter, Renita, inquired about having a home health care aide come a few times a week to help with patients care permanently. Private duty list provided and was directed to [place] [facility] to see what services they could provide.     The Plan for Transition of Care is related to the following treatment goals: home care     Patient was provided with a choice of provider and agrees   with the discharge plan. [x] Yes [] No    Freedom of choice list was provided with basic dialogue that supports the patient's individualized plan of care/goals, treatment preferences and shares the quality data associated with the providers. [x] Yes [] No     Electronically signed by Shelly Kelly RN on 2/28/2025 at 2:59 PM

## 2025-02-28 NOTE — PROCEDURES
[facility]          [address] [place] [address]                       ELECTROENCEPHALOGRAM REPORT      PATIENT NAME: PAMELA BARNES              : 1945  MED REC NO: [id number]                      ROOM: [place] [address]  ACCOUNT NO: [id number]                       ADMIT DATE: 2025  PROVIDER: Alfred Nuñez DO      DATE OF SERVICE:  2025    REFERRING PHYSICIAN:  Patrick Rosario NP    REASON FOR STUDY:  Episode of aphasia.    BRIEF HISTORY AND NEUROLOGIC FINDINGS:  The patient is a 79-year-old female, being evaluated for reported episode of aphasia.    MEDICATIONS:  The patient's centrally acting medications listed include Xanax.    EEG FINDINGS:  This is a 20-channel digital EEG performed utilizing bipolar and referential montages.  Wakefulness and drowsiness were obtained during the recording.  During maximal wakefulness, there is a moderate voltage, symmetric, fairly well regulated and reactive 8 hertz posterior background rhythm.  The anterior background consists of low voltage, fast frequencies.  Drowsiness is manifested by attenuation of waking background rhythms.    Photic stimulation was performed at various flash frequencies and evoked a mild posterior driving response at multiple frequencies.  Hyperventilation exercise was not performed due to the patient's age.    A 1-channel EKG rhythm strip was reviewed and showed no obvious cardiac dysrhythmias.    EEG DIAGNOSIS:  Normal awake EEG.    CLINICAL INTERPRETATION:  No definite epileptiform activity or evidence for focal cerebral dysfunction was present during this recording.  If seizures remain a clinical concern, then a repeat EEG may be of further benefit.  Clinical correlation is advised.          ALFRED NUÑEZ DO    D:  2025 16:20:59     T:  2025 18:14:43     [facility]  Job #:  [id number]     Doc#:  [id number]

## 2025-02-28 NOTE — PLAN OF CARE
Problem: Chronic Conditions and Co-morbidities  Goal: Patient's chronic conditions and co-morbidity symptoms are monitored and maintained or improved  Outcome: Progressing     Problem: Discharge Planning  Goal: Discharge to home or other facility with appropriate resources  Outcome: Progressing     Problem: Safety - Adult  Goal: Free from fall injury  Outcome: Progressing     Problem: Skin/Tissue Integrity  Goal: Skin integrity remains intact  Outcome: Progressing      Yes - the patient is able to be screened

## 2025-03-01 VITALS
TEMPERATURE: 98.4 F | HEART RATE: 90 BPM | DIASTOLIC BLOOD PRESSURE: 83 MMHG | WEIGHT: 170.1 LBS | SYSTOLIC BLOOD PRESSURE: 119 MMHG | OXYGEN SATURATION: 96 % | BODY MASS INDEX: 27.34 KG/M2 | HEIGHT: 66 IN | RESPIRATION RATE: 21 BRPM

## 2025-03-01 LAB
ANION GAP SERPL CALCULATED.3IONS-SCNC: 10 MMOL/L (ref 3–16)
BASOPHILS # BLD: 0 K/UL (ref 0–0.2)
BASOPHILS NFR BLD: 0.4 %
BUN SERPL-MCNC: 11 MG/DL (ref 7–20)
CALCIUM SERPL-MCNC: 8.4 MG/DL (ref 8.3–10.6)
CHLORIDE SERPL-SCNC: 108 MMOL/L (ref 99–110)
CO2 SERPL-SCNC: 22 MMOL/L (ref 21–32)
CREAT SERPL-MCNC: 0.5 MG/DL (ref 0.6–1.2)
DEPRECATED RDW RBC AUTO: 14.7 % (ref 12.4–15.4)
EOSINOPHIL # BLD: 0.3 K/UL (ref 0–0.6)
EOSINOPHIL NFR BLD: 4.5 %
GFR SERPLBLD CREATININE-BSD FMLA CKD-EPI: >90 ML/MIN/{1.73_M2}
GLUCOSE BLD-MCNC: 121 MG/DL (ref 70–99)
GLUCOSE BLD-MCNC: 127 MG/DL (ref 70–99)
GLUCOSE SERPL-MCNC: 126 MG/DL (ref 70–99)
HCT VFR BLD AUTO: 31.3 % (ref 36–48)
HGB BLD-MCNC: 10.7 G/DL (ref 12–16)
LYMPHOCYTES # BLD: 1.1 K/UL (ref 1–5.1)
LYMPHOCYTES NFR BLD: 18.8 %
MCH RBC QN AUTO: 31.8 PG (ref 26–34)
MCHC RBC AUTO-ENTMCNC: 34.3 G/DL (ref 31–36)
MCV RBC AUTO: 92.7 FL (ref 80–100)
MONOCYTES # BLD: 0.4 K/UL (ref 0–1.3)
MONOCYTES NFR BLD: 6.4 %
NEUTROPHILS # BLD: 4.1 K/UL (ref 1.7–7.7)
NEUTROPHILS NFR BLD: 69.9 %
PERFORMED ON: ABNORMAL
PERFORMED ON: ABNORMAL
PLATELET # BLD AUTO: 204 K/UL (ref 135–450)
PMV BLD AUTO: 7.9 FL (ref 5–10.5)
POTASSIUM SERPL-SCNC: 3.7 MMOL/L (ref 3.5–5.1)
RBC # BLD AUTO: 3.37 M/UL (ref 4–5.2)
SODIUM SERPL-SCNC: 140 MMOL/L (ref 136–145)
WBC # BLD AUTO: 5.9 K/UL (ref 4–11)

## 2025-03-01 PROCEDURE — 6360000002 HC RX W HCPCS: Performed by: INTERNAL MEDICINE

## 2025-03-01 PROCEDURE — 80048 BASIC METABOLIC PNL TOTAL CA: CPT

## 2025-03-01 PROCEDURE — 2700000000 HC OXYGEN THERAPY PER DAY

## 2025-03-01 PROCEDURE — 6370000000 HC RX 637 (ALT 250 FOR IP): Performed by: INTERNAL MEDICINE

## 2025-03-01 PROCEDURE — 94760 N-INVAS EAR/PLS OXIMETRY 1: CPT

## 2025-03-01 PROCEDURE — 36415 COLL VENOUS BLD VENIPUNCTURE: CPT

## 2025-03-01 PROCEDURE — 85025 COMPLETE CBC W/AUTO DIFF WBC: CPT

## 2025-03-01 PROCEDURE — 2580000003 HC RX 258: Performed by: INTERNAL MEDICINE

## 2025-03-01 RX ADMIN — CEFEPIME 2000 MG: 2 INJECTION, POWDER, FOR SOLUTION INTRAVENOUS at 06:42

## 2025-03-01 RX ADMIN — LISINOPRIL 20 MG: 10 TABLET ORAL at 09:53

## 2025-03-01 RX ADMIN — CALCIUM 500 MG: 500 TABLET ORAL at 09:54

## 2025-03-01 RX ADMIN — METOPROLOL TARTRATE 50 MG: 50 TABLET, FILM COATED ORAL at 09:54

## 2025-03-01 RX ADMIN — POTASSIUM CHLORIDE 10 MEQ: 1500 TABLET, EXTENDED RELEASE ORAL at 09:53

## 2025-03-01 RX ADMIN — ASPIRIN 81 MG: 81 TABLET, CHEWABLE ORAL at 09:54

## 2025-03-01 RX ADMIN — DULOXETINE HYDROCHLORIDE 60 MG: 60 CAPSULE, DELAYED RELEASE ORAL at 09:54

## 2025-03-01 RX ADMIN — INSULIN GLARGINE 10 UNITS: 100 INJECTION, SOLUTION SUBCUTANEOUS at 09:55

## 2025-03-01 RX ADMIN — APIXABAN 5 MG: 5 TABLET, FILM COATED ORAL at 09:54

## 2025-03-01 RX ADMIN — OSELTAMIVIR PHOSPHATE 75 MG: 75 CAPSULE ORAL at 09:53

## 2025-03-01 NOTE — CARE COORDINATION
Message left for the on call team at [facility] of pt's discharge for today and asked that they call me back if she is not accepted.  EKTA Doshi     Case Management   [phone]    3/1/2025  9:42 AM

## 2025-03-01 NOTE — DISCHARGE SUMMARY
TECHNOLOGIST PROVIDED HISTORY: Reason for exam:->Aphasia Reason for Exam: Aphasia FINDINGS: INTRACRANIAL STRUCTURES/VENTRICLES: There is no acute infarct. No mass effect or midline shift. No evidence of an acute intracranial hemorrhage.  The ventricles and sulci are normal in size and configuration.  The sellar/suprasellar regions appear unremarkable.  The normal signal voids within the major intracranial vessels appear maintained. Mild chronic microvascular disease is identified within the periventricular white matter. ORBITS: The visualized portion of the orbits demonstrate no acute abnormality. SINUSES: Moderate paranasal sinus disease is identified.  The mastoid air cells are well pneumatized. BONES/SOFT TISSUES: The bone marrow signal intensity appears normal. The soft tissues demonstrate no acute abnormality.     No acute intracranial abnormality or acute ischemia.     CTA HEAD NECK W CONTRAST    Result Date: 2/27/2025  EXAMINATION: CTA OF THE HEAD AND NECK WITH CONTRAST 2/27/2025 10:44 am: TECHNIQUE: CTA of the head and neck was performed with the administration of intravenous contrast. Multiplanar reformatted images are provided for review.  MIP images are provided for review. Stenosis of the internal carotid arteries measured using NASCET criteria. Automated exposure control, iterative reconstruction, and/or weight based adjustment of the mA/kV was utilized to reduce the radiation dose to as low as reasonably achievable. 3D reconstructed images were performed on a separate workstation and provided for review. COMPARISON: None. HISTORY: ORDERING SYSTEM PROVIDED HISTORY: code stroke TECHNOLOGIST PROVIDED HISTORY: Reason for exam:->code stroke Has a \"code stroke\" or \"stroke alert\" been called?->Yes Reason for Exam: ams FINDINGS: CTA NECK: AORTIC ARCH/ARCH VESSELS: Atherosclerosis of the aortic arch and arch vessels.  No significant stenosis of the innominate or subclavian arteries. CAROTID ARTERIES: No  List        START taking these medications      aspirin 81 MG chewable tablet  Take 1 tablet by mouth daily     atorvastatin 80 MG tablet  Commonly known as: LIPITOR  Take 1 tablet by mouth nightly     oseltamivir 75 MG capsule  Commonly known as: TAMIFLU  Take 1 capsule by mouth 2 times daily for 5 doses            CONTINUE taking these medications      alendronate 70 MG tablet  Commonly known as: FOSAMAX     ALPRAZolam 1 MG tablet  Commonly known as: XANAX     apixaban 5 MG Tabs tablet  Commonly known as: ELIQUIS     calcium carbonate 500 MG Tabs tablet  Commonly known as: OSCAL     Caltrate Bone Health Advanced 600-800 MG-UNIT Chew     DULoxetine 60 MG extended release capsule  Commonly known as: CYMBALTA     furosemide 40 MG tablet  Commonly known as: LASIX  Take 0.5 tablets by mouth daily as needed (for leg swellinig)     lisinopril 20 MG tablet  Commonly known as: PRINIVIL;ZESTRIL     metFORMIN 1000 MG tablet  Commonly known as: GLUCOPHAGE     metoprolol tartrate 50 MG tablet  Commonly known as: LOPRESSOR  Take 1 tablet by mouth 2 times daily     pantoprazole 40 MG tablet  Commonly known as: PROTONIX     potassium chloride 10 MEQ extended release tablet  Commonly known as: KLOR-CON M     QUEtiapine 100 MG tablet  Commonly known as: SEROQUEL               Where to Get Your Medications        These medications were sent to [facility] - [address] - P [phone] - F [phone]  [address] [place] [address]      Phone: [phone]   aspirin 81 MG chewable tablet  atorvastatin 80 MG tablet  oseltamivir 75 MG capsule         35 Minutes spent on patient evaluation, counseling and discharge planning.     Signed:  Issa Fernandez MD  3/1/2025, 3:11 PM

## 2025-03-01 NOTE — PLAN OF CARE
Problem: Skin/Tissue Integrity  Goal: Skin integrity remains intact  Description: 1.  Monitor for areas of redness and/or skin breakdown  2.  Assess vascular access sites hourly  3.  Every 4-6 hours minimum:  Change oxygen saturation probe site  4.  Every 4-6 hours:  If on nasal continuous positive airway pressure, respiratory therapy assess nares and determine need for appliance change or resting period  Outcome: Progressing     Problem: Discharge Planning  Goal: Discharge to home or other facility with appropriate resources  Outcome: Progressing     Problem: Safety - Adult  Goal: Free from fall injury  Outcome: Progressing     Problem: Chronic Conditions and Co-morbidities  Goal: Patient's chronic conditions and co-morbidity symptoms are monitored and maintained or improved  Outcome: Progressing

## 2025-03-01 NOTE — PLAN OF CARE
Problem: Chronic Conditions and Co-morbidities  Goal: Patient's chronic conditions and co-morbidity symptoms are monitored and maintained or improved  3/1/2025 1353 by Marianne Mathis RN  Outcome: Adequate for Discharge  3/1/2025 0531 by Sheridan De La O RN  Outcome: Progressing     Problem: Discharge Planning  Goal: Discharge to home or other facility with appropriate resources  3/1/2025 1353 by Marianne Mathis RN  Outcome: Adequate for Discharge  3/1/2025 0531 by Sheridan De La O RN  Outcome: Progressing     Problem: Safety - Adult  Goal: Free from fall injury  3/1/2025 1353 by Marianne Mathis RN  Outcome: Adequate for Discharge  3/1/2025 0531 by Sheridan De La O RN  Outcome: Progressing     Problem: Skin/Tissue Integrity  Goal: Skin integrity remains intact  Description: 1.  Monitor for areas of redness and/or skin breakdown  2.  Assess vascular access sites hourly  3.  Every 4-6 hours minimum:  Change oxygen saturation probe site  4.  Every 4-6 hours:  If on nasal continuous positive airway pressure, respiratory therapy assess nares and determine need for appliance change or resting period  Recent Flowsheet Documentation  Taken 3/1/2025 0815 by Marianne Mathis RN  Skin Integrity Remains Intact: Monitor for areas of redness and/or skin breakdown     Problem: Skin/Tissue Integrity  Goal: Skin integrity remains intact  Description: 1.  Monitor for areas of redness and/or skin breakdown  2.  Assess vascular access sites hourly  3.  Every 4-6 hours minimum:  Change oxygen saturation probe site  4.  Every 4-6 hours:  If on nasal continuous positive airway pressure, respiratory therapy assess nares and determine need for appliance change or resting period  3/1/2025 1353 by Marianne Mathis RN  Outcome: Adequate for Discharge  Flowsheets (Taken 3/1/2025 0815)  Skin Integrity Remains Intact: Monitor for areas of redness and/or skin breakdown  3/1/2025 0531 by Sheridan De La O RN  Outcome: Progressing

## 2025-03-01 NOTE — PROGRESS NOTES
Brief neurology note    EEG was normal.    Please see last neurology note for impression/recommendations.    Lilian Mejia MD  Neurology

## 2025-03-01 NOTE — DISCHARGE INSTR - COC
Continuity of Care Form    Patient Name: Regi Roche   :  1945  MRN:  [id number]    Admit date:  2025  Discharge date:  3/1/25    Code Status Order: Full Code   Advance Directives:   Advance Care Flowsheet Documentation             Admitting Physician:  Nii Kay MD  PCP: Moses Chery MD    Discharging Nurse: Marianne Mathis RN  Discharging Hospital Unit/Room#: M4H-5412/5258-01  Discharging Unit Phone Number: [phone]    Emergency Contact:   Extended Emergency Contact Information  Primary Emergency Contact: Wojciech,Saqib  Address: [address]  Home Phone: [phone]  Relation: Child  Secondary Emergency Contact: [place] [address] [facility]  Home Phone: [phone]  Mobile Phone: [phone]  Relation: Child    Past Surgical History:  Past Surgical History:   Procedure Laterality Date    BACK SURGERY      BREAST LUMPECTOMY Left     CARPAL TUNNEL RELEASE      left    COLONOSCOPY      EP DEVICE PROCEDURE N/A 2024    Loop recorder removal performed by Felix Palacios MD at [facility] CARDIAC CATH LAB    HIP FRACTURE SURGERY Right 3/18/2020    RIGHT HIP GAMMA NAIL performed by Moses Brown MD at [facility] OR    HYSTERECTOMY (CERVIX STATUS UNKNOWN)      INTRACAPSULAR CATARACT EXTRACTION Right 3/11/2019    PHACOEMULSIFICATION WITH INTRAOCULAR LENS IMPLANT  performed by Jay Anand MD at [facility]    INTRACAPSULAR CATARACT EXTRACTION Left 3/25/2019    PHACOEMULSIFICATION WITH INTRAOCULAR LENS IMPLANT  performed by Jay Anand MD at [facility]    LYMPH NODE BIOPSY Left     TONSILLECTOMY         Immunization History:   Immunization History   Administered Date(s) Administered    COVID-19, PFIZER Bivalent, DO NOT Dilute, (age 12y+), IM, 30 mcg/0.3 mL 2022    COVID-19, PFIZER PURPLE top, DILUTE for use, (age 12 y+), 30mcg/0.3mL 2021, 2021, 10/13/2021    COVID-19,

## 2025-03-01 NOTE — CARE COORDINATION
DC order noted.  Spoke with RN  who states she is off oxygen at this time.  Referral sent to [facility].  Family to transport home.    EKTA Doshi     Case Management   [phone]    3/1/2025  9:40 AM

## 2025-03-03 LAB
BACTERIA BLD CULT ORG #2: NORMAL
BACTERIA BLD CULT: NORMAL

## 2025-03-30 PROBLEM — J10.1 INFLUENZA A: Status: RESOLVED | Noted: 2025-02-27 | Resolved: 2025-03-30

## 2025-04-03 ENCOUNTER — OFFICE VISIT (OUTPATIENT)
Dept: ORTHOPEDIC SURGERY | Age: 80
End: 2025-04-03
Payer: MEDICARE

## 2025-04-03 VITALS — WEIGHT: 170 LBS | BODY MASS INDEX: 27.32 KG/M2 | HEIGHT: 66 IN

## 2025-04-03 DIAGNOSIS — M70.61 TROCHANTERIC BURSITIS OF RIGHT HIP: ICD-10-CM

## 2025-04-03 DIAGNOSIS — M25.561 RIGHT KNEE PAIN, UNSPECIFIED CHRONICITY: ICD-10-CM

## 2025-04-03 DIAGNOSIS — M25.551 RIGHT HIP PAIN: Primary | ICD-10-CM

## 2025-04-03 PROCEDURE — 1159F MED LIST DOCD IN RCRD: CPT | Performed by: PHYSICIAN ASSISTANT

## 2025-04-03 PROCEDURE — 1036F TOBACCO NON-USER: CPT | Performed by: PHYSICIAN ASSISTANT

## 2025-04-03 PROCEDURE — 1125F AMNT PAIN NOTED PAIN PRSNT: CPT | Performed by: PHYSICIAN ASSISTANT

## 2025-04-03 PROCEDURE — 1123F ACP DISCUSS/DSCN MKR DOCD: CPT | Performed by: PHYSICIAN ASSISTANT

## 2025-04-03 PROCEDURE — 99213 OFFICE O/P EST LOW 20 MIN: CPT | Performed by: PHYSICIAN ASSISTANT

## 2025-04-03 RX ORDER — TRIAMCINOLONE ACETONIDE 40 MG/ML
40 INJECTION, SUSPENSION INTRA-ARTICULAR; INTRAMUSCULAR ONCE
Status: COMPLETED | OUTPATIENT
Start: 2025-04-03 | End: 2025-04-03

## 2025-04-03 RX ORDER — BUPIVACAINE HYDROCHLORIDE 2.5 MG/ML
2 INJECTION, SOLUTION INFILTRATION; PERINEURAL ONCE
Status: COMPLETED | OUTPATIENT
Start: 2025-04-03 | End: 2025-04-03

## 2025-04-03 RX ADMIN — BUPIVACAINE HYDROCHLORIDE 5 MG: 2.5 INJECTION, SOLUTION INFILTRATION; PERINEURAL at 15:30

## 2025-04-03 RX ADMIN — TRIAMCINOLONE ACETONIDE 40 MG: 40 INJECTION, SUSPENSION INTRA-ARTICULAR; INTRAMUSCULAR at 15:31

## 2025-04-03 NOTE — PROGRESS NOTES
This dictation was done with [employer] dictation and may contain mechanical errors related to translation.    I have today reviewed with Regi Roche the clinically relevant, past medical history, medications, allergies, family history, social history, and Review Of Systems form the patient’s most recent history form & I have documented any details relevant to today's presenting complaints in my history below. Ms. Regi Roche's self-reported past medical history, medications, allergies, family history, social history, and Review Of Systems form has been scanned into the chart under the \"Media\" tab.    Subjective:  Regi Roche is a 79 y.o. who is here as an established patient of [facility] orthopedics as I had seen her recently for humerus fracture and she is complaining of pain in her right lateral hip.  Skin a lot of dysfunction with the right side following a stroke.  Prior to that she had a hip fracture that was fixed by Dr. Renard bender in July 2020.  She states that sometimes she has 9 out of 10 pain in the right lateral hip sole foot 3.  AP pelvis and a 2 view right hip      Patient Active Problem List   Diagnosis    Difficulty with family    Other and unspecified hyperlipidemia    Convulsions (HCC)    Pain in joint, forearm    Hypercalcemia    Malignant neoplasm of other specified sites of female breast    Depressive disorder, not elsewhere classified    Symptomatic menopausal or female climacteric states    Degeneration of lumbar or lumbosacral intervertebral disc    Esophageal reflux    Essential hypertension, benign    Type II or unspecified type diabetes mellitus without mention of complication, not stated as uncontrolled    Nonspecific elevation of levels of transaminase or lactic acid dehydrogenase (LDH)    Received intravenous tissue plasminogen activator (tPA) in emergency department    Acute bilateral cerebral infarction in a watershed distribution (HCC)    Bone fracture    Closed fracture of right

## 2025-05-02 ENCOUNTER — OFFICE VISIT (OUTPATIENT)
Dept: ORTHOPEDIC SURGERY | Age: 80
End: 2025-05-02
Payer: MEDICARE

## 2025-05-02 VITALS — HEIGHT: 66 IN | BODY MASS INDEX: 27.32 KG/M2 | WEIGHT: 170 LBS

## 2025-05-02 DIAGNOSIS — M70.61 TROCHANTERIC BURSITIS OF RIGHT HIP: Primary | ICD-10-CM

## 2025-05-02 PROCEDURE — 1160F RVW MEDS BY RX/DR IN RCRD: CPT | Performed by: ORTHOPAEDIC SURGERY

## 2025-05-02 PROCEDURE — 99213 OFFICE O/P EST LOW 20 MIN: CPT | Performed by: ORTHOPAEDIC SURGERY

## 2025-05-02 PROCEDURE — G8400 PT W/DXA NO RESULTS DOC: HCPCS | Performed by: ORTHOPAEDIC SURGERY

## 2025-05-02 PROCEDURE — 1036F TOBACCO NON-USER: CPT | Performed by: ORTHOPAEDIC SURGERY

## 2025-05-02 PROCEDURE — 1125F AMNT PAIN NOTED PAIN PRSNT: CPT | Performed by: ORTHOPAEDIC SURGERY

## 2025-05-02 PROCEDURE — G8417 CALC BMI ABV UP PARAM F/U: HCPCS | Performed by: ORTHOPAEDIC SURGERY

## 2025-05-02 PROCEDURE — 1159F MED LIST DOCD IN RCRD: CPT | Performed by: ORTHOPAEDIC SURGERY

## 2025-05-02 PROCEDURE — G8427 DOCREV CUR MEDS BY ELIG CLIN: HCPCS | Performed by: ORTHOPAEDIC SURGERY

## 2025-05-02 PROCEDURE — 1090F PRES/ABSN URINE INCON ASSESS: CPT | Performed by: ORTHOPAEDIC SURGERY

## 2025-05-02 PROCEDURE — 1123F ACP DISCUSS/DSCN MKR DOCD: CPT | Performed by: ORTHOPAEDIC SURGERY

## 2025-05-02 NOTE — PROGRESS NOTES
[facility] Orth[facility]ics Office Visit  Charles Boucher MD    Reason for visit: Lateral right hip pain    HPI:  Regi Roche is a 79 y.o. who is here in follow-up of lateral right hip pain.  I have seen her in the past for a right shoulder fracture.  She saw Clement Kuhn 1 month ago for lateral right hip pain.  She comes in today in a transport chair.  Her history is significant for right intertrochanteric femur fracture for which she underwent cephalomedullary nail with Dr. Brown on March 18, 2020.  She reports lateral right hip pain since that time.  She had 1 day of symptomatic relief with a greater trochanteric bursa steroid injection last month.  She rates pain as up to 9/10.  She falls frequently due to issues with balance.  She walks with the use of a walker.  Her biggest issue is frequent falls.    Past Medical History:   Diagnosis Date    Arthritis     Back pain     Cancer (HCC)     breast, Left Lumpectomy and radiation      Cerebral artery occlusion with cerebral infarction (HCC)     Diabetes     High blood pressure     Hyperlipidemia     Seizure ([facility])     10 yrs ago     SVT (supraventricular tachycardia)     Type II or unspecified type diabetes mellitus without mention of complication, not stated as uncontrolled        Exam:  Ht 1.676 m (5' 6\")   Wt 77.1 kg (170 lb)   BMI 27.44 kg/m²     Appearance: sitting in transport chair, appears to be in no acute distress, awake and alert  Resp: unlabored breathing on room air  Skin: warm, dry and intact with out erythema or significant increased temperature  Neuro: grossly intact both lower extremities. Intact sensation to light touch. Motor exam 4+ to 5/5 in all major motor groups.  RLE: Tender over greater trochanter.  She demonstrates active hip range of motion with minimal pain.  Negative Stinchfield examination.  Motor function and sensation intact distally.    Imaging:  Recent right hip radiographs reviewed and are significant for mild joint space narrowing due to

## (undated) DEVICE — PAD,ABDOMINAL,8"X7.5",STERILE,LF,1/PK: Brand: MEDLINE

## (undated) DEVICE — Device: Brand: MEDEX

## (undated) DEVICE — SURGICAL PROCEDURE PACK PIK PPK374205] ALCON LABORATORIES INC]

## (undated) DEVICE — 3M™ COBAN™ NL STERILE NON-LATEX SELF-ADHERENT WRAP, 2084S, 4 IN X 5 YD (10 CM X 4,5 M), 18 ROLLS/CASE: Brand: 3M™ COBAN™

## (undated) DEVICE — GLOVE SURG SZ 85 L12IN FNGR THK87MIL WHT LTX FREE

## (undated) DEVICE — SYRINGE MED 10ML LUERLOCK TIP W/O SFTY DISP

## (undated) DEVICE — ELECTRODE PT RET AD L9FT HI MOIST COND ADH HYDRGEL CORDED

## (undated) DEVICE — STAPLER SKIN H3.9MM WIRE DIA0.58MM CRWN 6.9MM 35 STPL FIX

## (undated) DEVICE — K-WIRE: Brand: GAMMA

## (undated) DEVICE — SYRINGE MED 30ML STD CLR PLAS LUERLOCK TIP N CTRL DISP

## (undated) DEVICE — CHLORAPREP 26ML ORANGE

## (undated) DEVICE — CANISTER, RIGID, 1200CC: Brand: MEDLINE INDUSTRIES, INC.

## (undated) DEVICE — SURGICAL PROC PACK SHT WEST V4

## (undated) DEVICE — SPONGE GZ W4XL4IN COT 12 PLY TYP VII WVN C FLD DSGN

## (undated) DEVICE — Z DISCONTINUED GLOVE SURG SZ 7.5 L12IN FNGR THK13MIL WHT ISOLEX

## (undated) DEVICE — SPONGE LAP W18XL18IN WHT COT 4 PLY FLD STRUNG RADPQ DISP ST

## (undated) DEVICE — COVER LT HNDL BLU PLAS

## (undated) DEVICE — MERCY HEALTH WEST TURNOVER: Brand: MEDLINE INDUSTRIES, INC.

## (undated) DEVICE — SOLUTION IV IRRIG POUR BRL 0.9% SODIUM CHL 2F7124

## (undated) DEVICE — SOLUTION IRRIG 250ML STRL H2O PLAS POUR BTL USP

## (undated) DEVICE — 6619 2 PTNT ISO SYS INCISE AREA&LT;(&GT;&&LT;)&GT;P: Brand: STERI-DRAPE™ IOBAN™ 2

## (undated) DEVICE — SYRINGE MED 3ML CLR PLAS STD N CTRL LUERLOCK TIP DISP

## (undated) DEVICE — ADAPT CLIP: Brand: GAMMA

## (undated) DEVICE — GLOVE ORANGE PI 7 1/2   MSG9075

## (undated) DEVICE — SYRINGE TB 1ML NDL 25GA L0.625IN PLAS SLIP TIP CONVENTIONAL

## (undated) DEVICE — DRESSING,GAUZE,XEROFORM,CURAD,1"X8",ST: Brand: CURAD

## (undated) DEVICE — SUTURE VCRL SZ 0 L27IN ABSRB UD L36MM CT-1 1/2 CIR J260H

## (undated) DEVICE — GOWN SIRUS NONREIN XL W/TWL: Brand: MEDLINE INDUSTRIES, INC.

## (undated) DEVICE — MAJOR SET UP PK

## (undated) DEVICE — PADDING UNDERCAST W4INXL4YD 100% COT CRIMPED FINISH WBRL II

## (undated) DEVICE — GUIDE WIRE, BALL-TIPPED, STERILE

## (undated) DEVICE — DRILL, AO SMALL: Brand: GAMMA

## (undated) DEVICE — SUTURE ABSORBABLE BRAIDED 2-0 CT-1 27 IN UD VICRYL J259H